# Patient Record
Sex: FEMALE | Race: WHITE | NOT HISPANIC OR LATINO | Employment: OTHER | URBAN - METROPOLITAN AREA
[De-identification: names, ages, dates, MRNs, and addresses within clinical notes are randomized per-mention and may not be internally consistent; named-entity substitution may affect disease eponyms.]

---

## 2017-01-16 ENCOUNTER — OFFICE VISIT (OUTPATIENT)
Dept: MEDICAL GROUP | Facility: CLINIC | Age: 27
End: 2017-01-16
Payer: COMMERCIAL

## 2017-01-16 ENCOUNTER — PATIENT MESSAGE (OUTPATIENT)
Dept: MEDICAL GROUP | Facility: CLINIC | Age: 27
End: 2017-01-16

## 2017-01-16 VITALS
OXYGEN SATURATION: 97 % | BODY MASS INDEX: 40.16 KG/M2 | DIASTOLIC BLOOD PRESSURE: 86 MMHG | SYSTOLIC BLOOD PRESSURE: 138 MMHG | TEMPERATURE: 96.7 F | HEIGHT: 68 IN | HEART RATE: 96 BPM | WEIGHT: 265 LBS | RESPIRATION RATE: 16 BRPM

## 2017-01-16 DIAGNOSIS — L73.2 SUPPURATIVE HIDRADENITIS: ICD-10-CM

## 2017-01-16 DIAGNOSIS — E66.01 MORBID OBESITY WITH BMI OF 40.0-44.9, ADULT (HCC): ICD-10-CM

## 2017-01-16 DIAGNOSIS — J06.9 VIRAL URI WITH COUGH: ICD-10-CM

## 2017-01-16 DIAGNOSIS — F33.1 MAJOR DEPRESSIVE DISORDER, RECURRENT EPISODE, MODERATE (HCC): ICD-10-CM

## 2017-01-16 PROCEDURE — 99214 OFFICE O/P EST MOD 30 MIN: CPT | Performed by: NURSE PRACTITIONER

## 2017-01-16 RX ORDER — SPIRONOLACTONE 25 MG/1
25 TABLET ORAL DAILY
Qty: 30 TAB | Refills: 3 | Status: SHIPPED | OUTPATIENT
Start: 2017-01-16 | End: 2017-05-29 | Stop reason: SDUPTHER

## 2017-01-16 RX ORDER — FLUTICASONE PROPIONATE 50 MCG
1 SPRAY, SUSPENSION (ML) NASAL DAILY
Qty: 16 G | Refills: 2 | Status: SHIPPED | OUTPATIENT
Start: 2017-01-16 | End: 2019-02-04

## 2017-01-16 NOTE — PROGRESS NOTES
CC: Referral Needed        HPI:     Ryanne presents today for the followin. Major depressive disorder, recurrent episode, moderate (CMS-HCC)  Here requesting referral back to [facility] psychology. Currently not on any medications for depression. Did not do well with Vybrid, effects or. One was taking low-dose Prozac. This didn't specifically help but he didn't do worse with her behavior. Zoloft made her want to kill herself.  Currently having some situational issues moving out of the house where she is having a lot of social discord with her roommates. Living with her parents. Just signed a new lease.    2. Viral URI with cough  States she's been sick for the last several weeks. Initially had some body aches. No fevers. Currently his left the dry cough lots of nasal drainage. Slight ear pressure bilaterally.    3. Suppurative hidradenitis   she's been having these boils in her bilateral armpits ever since she started menstruating around 13 or 14. Currently has a lot of scars. The decision to be aggravated by her bra touching the areas. Doesn't feel like one specific area is worse than another her that she needs specific attention. She has tried multiple different soaps etc. to try and see if there's anything she can do the Melody. Limited to her axillae. Knot in groin or other areas    4. Morbid obesity with BMI of 40.0-44.9, adult (CMS-HCC)  Above ideal weight for height. Weight is currently down    Current Outpatient Prescriptions   Medication Sig Dispense Refill   • fluticasone (FLONASE) 50 MCG/ACT nasal spray Spray 1 Spray in nose every day. 16 g 2   • spironolactone (ALDACTONE) 25 MG Tab Take 1 Tab by mouth every day. 30 Tab 3     No current facility-administered medications for this visit.     Social History   Substance Use Topics   • Smoking status: Never Smoker    • Smokeless tobacco: Never Used   • Alcohol Use: 0.5 oz/week     10-14 Cans of beer, 1 Standard drinks or equivalent per week      Comment:  "occasionally     I reviewed patients allergies, problem list and medications today in [facility].    ROS: Any/all pertinent positives listed in the HPI, otherwise all others reviewed are negative today.      /86 mmHg  Pulse 96  Temp(Src) 35.9 °C (96.7 °F)  Resp 16  Ht 1.727 m (5' 8\")  Wt 120.203 kg (265 lb)  BMI 40.30 kg/m2  SpO2 97%  LMP 12/25/2016  Breastfeeding? No    Exam:   Gen: Alert and oriented, No apparent distress. WDWN  Psych: A+Ox3, normal affect and mood  Skin: Warm, dry and intact. Good turgor   No rashes in visible areas.  Eye: Conjunctiva clear, lids normal  ENMT: Lips without lesions, good dentition   Oropharynx clear. TMs nonerythematous bilaterally, slight bulge bilaterally. No erythema bilateral nasal turbinates. No pain with pressure over the frontal or maxillary sinuses bilaterally  Neck: No Lymphadenopathy, Thyromegaly, Bruits.   Trachea midline, no masses  Lungs: Clear to auscultation bilaterally, no rales or rhonchi   Unlabored respiratory effort.   CV: Regular rate and rhythm, S1, S2. No murmurs.   No Edema        Assessment and Plan.   26 y.o. female with the following issues.    1. Major depressive disorder, recurrent episode, moderate (CMS-HCC)  Stable. We did discuss starting another medication today. She was maybe interested in Lexapro. Maybe interested in restarting Prozac and increasing the dose. We did discuss today she will consider doing this and are just getting into the intensive outpatient behavioral health program. She'll let me know what she wants to do  - REFERRAL TO BEHAVIORAL HEALTH    2. Viral URI with cough  Discussed viral versus bacterial, importance of fluids, rest and hand hygiene.  May use over-the-counter anti-pyuretics and/or antitussives as needed.  Return to the office if necessary temperature, symptoms aren't resolving or new symptoms.  Patient will start Flonase 2 sprays twice a day daily. She'll start doing a nasal saline rinse 3-4 times a day and " this is reviewed extensively how to do this in the office today. If she's not improving by the end weeks and notify me and I will send an antibiotics. She is having any worsening in her symptoms she'll notify me immediately    - fluticasone (FLONASE) 50 MCG/ACT nasal spray; Spray 1 Spray in nose every day.  Dispense: 16 g; Refill: 2    3. Suppurative hidradenitis  Stable. Trial of Aldactone. Patient has an IUD. We did discuss home care  - spironolactone (ALDACTONE) 25 MG Tab; Take 1 Tab by mouth every day.  Dispense: 30 Tab; Refill: 3    4. Morbid obesity with BMI of 40.0-44.9, adult (CMS-HCC)  She is losing weight  - Patient identified as having weight management issue.  Appropriate orders and counseling given.

## 2017-01-16 NOTE — MR AVS SNAPSHOT
"Ryanne Story   2017 10:00 AM   Office Visit   MRN: [id number]    Department:  [facility]   Dept Phone:  [phone]    Description:  Female : 1990   Provider:  OSVALDO Otero           Reason for Visit     Referral Needed for behavioral health      Allergies as of 2017     Allergen Noted Reactions    Zoloft 10/19/2015       Self-destructive behaviors      You were diagnosed with     Major depressive disorder, recurrent episode, moderate (CMS-HCC)   [296.32.ICD-9-CM]       Viral URI with cough   [482274]       Suppurative hidradenitis   [865964]         Vital Signs     Blood Pressure Pulse Temperature Respirations Height Weight    138/86 mmHg 96 35.9 °C (96.7 °F) 16 1.727 m (5' 8\") 120.203 kg (265 lb)    Body Mass Index Oxygen Saturation Last Menstrual Period Breastfeeding? Smoking Status       40.30 kg/m2 97% 2016 No Never Smoker        Basic Information     Date Of Birth Sex Race Ethnicity Preferred Language    1990 Female White Non- English      Problem List              ICD-10-CM Priority Class Noted - Resolved    BMI 40.0-44.9, adult (CMS-HCC) Z68.41   2015 - Present    Hyperlipidemia with target LDL less than 160 E78.5   2015 - Present    IUD (intrauterine device) in place Z97.5   2015 - Present    Major depressive disorder, recurrent episode, moderate (CMS-HCC) F33.1   2015 - Present    Suppurative hidradenitis L73.2   2017 - Present      Health Maintenance        Date Due Completion Dates    IMM HEP B VACCINE (1 of 3 - Primary Series) 1989 ---    IMM HEP A VACCINE (1 of 2 - Standard Series) 1990 ---    IMM VARICELLA (CHICKENPOX) VACCINE (1 of 2 - 2 Dose Adolescent Series) 2002 ---    IMM HPV VACCINE (2 of 3 - Female 3 Dose Series) 2015 (Postponed)    Override on 2015: Postponed (thinking about it)    IMM INFLUENZA (1) 2016 ---    PAP SMEAR 2017 (Done)    Override on 2014: " Done (Hx of Nl, [facility]'s office)    IMM DTaP/Tdap/Td Vaccine (2 - Td) 4/18/2025 4/18/2015            Current Immunizations     Tdap Vaccine 4/18/2015 10:45 PM      Below and/or attached are the medications your provider expects you to take. Review all of your home medications and newly ordered medications with your provider and/or pharmacist. Follow medication instructions as directed by your provider and/or pharmacist. Please keep your medication list with you and share with your provider. Update the information when medications are discontinued, doses are changed, or new medications (including over-the-counter products) are added; and carry medication information at all times in the event of emergency situations     Allergies:  ZOLOFT - (reactions not documented)               Medications  Valid as of: January 16, 2017 - 10:41 AM    Generic Name Brand Name Tablet Size Instructions for use    Fluticasone Propionate (Suspension) FLONASE 50 MCG/ACT Spray 1 Spray in nose every day.        Spironolactone (Tab) ALDACTONE 25 MG Take 1 Tab by mouth every day.        .                 Medicines prescribed today were sent to:     [facility] [address] [place] [address] (S), [place] - [address]    [facility] ([place] [place] [address]    Phone: [phone] Fax: [phone]    Open 24 Hours?: No      Medication refill instructions:       If your prescription bottle indicates you have medication refills left, it is not necessary to call your provider’s office. Please contact your pharmacy and they will refill your medication.    If your prescription bottle indicates you do not have any refills left, you may request refills at any time through one of the following ways: The online [email or web address] system (except Urgent Care), by calling your provider’s office, or by asking your pharmacy to contact your provider’s office with a refill request. Medication refills are processed only during regular business hours and may not be available until  the next business day. Your provider may request additional information or to have a follow-up visit with you prior to refilling your medication.   *Please Note: Medication refills are assigned a new Rx number when refilled electronically. Your pharmacy may indicate that no refills were authorized even though a new prescription for the same medication is available at the pharmacy. Please request the medicine by name with the pharmacy before contacting your provider for a refill.        Referral     A referral request has been sent to our patient care coordination department. Please allow 3-5 business days for us to process this request and contact you either by phone or mail. If you do not hear from us by the 5th business day, please call us at [phone].           [employer] Access Code: Activation code not generated  Current [employer] Status: Active

## 2017-01-17 RX ORDER — PHENTERMINE HYDROCHLORIDE 37.5 MG/1
37.5 CAPSULE ORAL EVERY MORNING
Qty: 30 CAP | Refills: 2 | Status: SHIPPED | OUTPATIENT
Start: 2017-01-17 | End: 2017-07-25

## 2017-03-16 ENCOUNTER — PATIENT MESSAGE (OUTPATIENT)
Dept: MEDICAL GROUP | Facility: CLINIC | Age: 27
End: 2017-03-16

## 2017-03-16 DIAGNOSIS — E78.5 HYPERLIPIDEMIA WITH TARGET LDL LESS THAN 160: ICD-10-CM

## 2017-03-16 DIAGNOSIS — F33.1 MAJOR DEPRESSIVE DISORDER, RECURRENT EPISODE, MODERATE (HCC): ICD-10-CM

## 2017-05-10 ENCOUNTER — PATIENT MESSAGE (OUTPATIENT)
Dept: MEDICAL GROUP | Facility: CLINIC | Age: 27
End: 2017-05-10

## 2017-05-10 RX ORDER — BENZONATATE 100 MG/1
100 CAPSULE ORAL 3 TIMES DAILY PRN
Qty: 60 CAP | Refills: 0 | Status: SHIPPED | OUTPATIENT
Start: 2017-05-10 | End: 2018-01-22

## 2017-05-17 ENCOUNTER — TELEPHONE (OUTPATIENT)
Dept: MEDICAL GROUP | Facility: CLINIC | Age: 27
End: 2017-05-17

## 2017-05-30 RX ORDER — SPIRONOLACTONE 25 MG/1
TABLET ORAL
Qty: 30 TAB | Refills: 5 | Status: SHIPPED | OUTPATIENT
Start: 2017-05-30 | End: 2017-07-03 | Stop reason: SDUPTHER

## 2017-07-01 ENCOUNTER — PATIENT MESSAGE (OUTPATIENT)
Dept: MEDICAL GROUP | Facility: CLINIC | Age: 27
End: 2017-07-01

## 2017-07-03 RX ORDER — SPIRONOLACTONE 25 MG/1
25 TABLET ORAL DAILY
Qty: 30 TAB | Refills: 5 | Status: SHIPPED | OUTPATIENT
Start: 2017-07-03 | End: 2018-01-22 | Stop reason: SDUPTHER

## 2017-07-03 NOTE — TELEPHONE ENCOUNTER
From: Ryanne Story  To: OSVALDO Otero  Sent: 7/1/2017 9:01 PM PDT  Subject: Prescription Question    Has my pharmacy contacted your office re: a refill for spironolactone? Whenever I try to refill it it says they need go contact you for renewal but then to my knowledge nothing happens after that. Thanks :)

## 2017-07-17 ENCOUNTER — TELEPHONE (OUTPATIENT)
Dept: MEDICAL GROUP | Facility: CLINIC | Age: 27
End: 2017-07-17

## 2017-07-25 ENCOUNTER — OFFICE VISIT (OUTPATIENT)
Dept: MEDICAL GROUP | Facility: CLINIC | Age: 27
End: 2017-07-25
Payer: COMMERCIAL

## 2017-07-25 VITALS
SYSTOLIC BLOOD PRESSURE: 106 MMHG | OXYGEN SATURATION: 97 % | DIASTOLIC BLOOD PRESSURE: 68 MMHG | HEART RATE: 108 BPM | BODY MASS INDEX: 41.83 KG/M2 | RESPIRATION RATE: 16 BRPM | TEMPERATURE: 98.1 F | WEIGHT: 276 LBS | HEIGHT: 68 IN

## 2017-07-25 DIAGNOSIS — F17.200 SMOKER: ICD-10-CM

## 2017-07-25 DIAGNOSIS — Z86.59 HISTORY OF SUICIDAL IDEATION: ICD-10-CM

## 2017-07-25 DIAGNOSIS — F33.1 MAJOR DEPRESSIVE DISORDER, RECURRENT EPISODE, MODERATE (HCC): ICD-10-CM

## 2017-07-25 DIAGNOSIS — F33.2 SEVERE EPISODE OF RECURRENT MAJOR DEPRESSIVE DISORDER, WITHOUT PSYCHOTIC FEATURES (HCC): ICD-10-CM

## 2017-07-25 PROBLEM — F32.9 MAJOR DEPRESSIVE DISORDER: Status: ACTIVE | Noted: 2017-07-25

## 2017-07-25 PROCEDURE — 93000 ELECTROCARDIOGRAM COMPLETE: CPT | Performed by: NURSE PRACTITIONER

## 2017-07-25 PROCEDURE — 99214 OFFICE O/P EST MOD 30 MIN: CPT | Mod: 25 | Performed by: NURSE PRACTITIONER

## 2017-07-25 RX ORDER — ZIPRASIDONE HYDROCHLORIDE 20 MG/1
CAPSULE ORAL
COMMUNITY
Start: 2017-07-06 | End: 2019-02-04

## 2017-07-25 ASSESSMENT — PATIENT HEALTH QUESTIONNAIRE - PHQ9
SUM OF ALL RESPONSES TO PHQ QUESTIONS 1-9: 20
CLINICAL INTERPRETATION OF PHQ2 SCORE: 2
5. POOR APPETITE OR OVEREATING: 3 - NEARLY EVERY DAY

## 2017-07-25 NOTE — MR AVS SNAPSHOT
"        Ryanne Story   2017 1:00 PM   Office Visit   MRN: [id number]    Department:  [facility]   Dept Phone:  [phone]    Description:  Female : 1990   Provider:  OSVALDO Otero           Reason for Visit     Depression           Allergies as of 2017     Allergen Noted Reactions    Zoloft 10/19/2015       Self-destructive behaviors      You were diagnosed with     Major depressive disorder, recurrent episode, moderate (CMS-HCC)   [296.32.ICD-9-CM]       Severe episode of recurrent major depressive disorder, without psychotic features (CMS-[facility])   [7199179]       History of suicidal ideation   [0002678]       Smoker   [484588]       BMI 40.0-44.9, adult (CMS-HCC)   [059256]         Vital Signs     Blood Pressure Pulse Temperature Respirations Height Weight    106/68 mmHg 108 36.7 °C (98.1 °F) 16 1.727 m (5' 7.99\") 125.193 kg (276 lb)    Body Mass Index Oxygen Saturation Smoking Status             41.98 kg/m2 97% Never Smoker          Basic Information     Date Of Birth Sex Race Ethnicity Preferred Language    1990 Female White Non- English      Problem List              ICD-10-CM Priority Class Noted - Resolved    BMI 40.0-44.9, adult (CMS-HCC) Z68.41   2015 - Present    Hyperlipidemia with target LDL less than 160 E78.5   2015 - Present    IUD (intrauterine device) in place Z97.5   2015 - Present    Major depressive disorder, recurrent episode, moderate (CMS-HCC) F33.1   2015 - Present    Suppurative hidradenitis L73.2   2017 - Present    Morbid obesity with BMI of 40.0-44.9, adult ([facility]) E66.01, Z68.41   2017 - Present    Major depressive disorder F32.9   2017 - Present      Health Maintenance        Date Due Completion Dates    PAP SMEAR 2017 (Done)    Override on 2014: Done (Hx of Nl, [facility] office)    IMM INFLUENZA (1) 2017 ---    IMM DTaP/Tdap/Td Vaccine (2 - Td) 2025            Current " Immunizations     Tdap Vaccine 4/18/2015 10:45 PM      Below and/or attached are the medications your provider expects you to take. Review all of your home medications and newly ordered medications with your provider and/or pharmacist. Follow medication instructions as directed by your provider and/or pharmacist. Please keep your medication list with you and share with your provider. Update the information when medications are discontinued, doses are changed, or new medications (including over-the-counter products) are added; and carry medication information at all times in the event of emergency situations     Allergies:  ZOLOFT - (reactions not documented)               Medications  Valid as of: July 25, 2017 -  2:10 PM    Generic Name Brand Name Tablet Size Instructions for use    Benzonatate (Cap) TESSALON 100 MG Take 1 Cap by mouth 3 times a day as needed for Cough.        Fluticasone Propionate (Suspension) FLONASE 50 MCG/ACT Spray 1 Spray in nose every day.        Spironolactone (Tab) ALDACTONE 25 MG Take 1 Tab by mouth every day.        Varenicline Tartrate (Misc) CHANTIX JERICHO 0.5 MG X 11 & 1 MG X 42 Per packet        Ziprasidone HCl (Cap) GEODON 20 MG         .                 Medicines prescribed today were sent to:     [facility] [address] [place] [address] (S), [place] - [address] [place] [address] [facility] (S) [place] [address]    Phone: [phone] Fax: [phone]    Open 24 Hours?: No      Medication refill instructions:       If your prescription bottle indicates you have medication refills left, it is not necessary to call your provider’s office. Please contact your pharmacy and they will refill your medication.    If your prescription bottle indicates you do not have any refills left, you may request refills at any time through one of the following ways: The online [employer] system (except Urgent Care), by calling your provider’s office, or by asking your pharmacy to contact your provider’s office with a refill  request. Medication refills are processed only during regular business hours and may not be available until the next business day. Your provider may request additional information or to have a follow-up visit with you prior to refilling your medication.   *Please Note: Medication refills are assigned a new Rx number when refilled electronically. Your pharmacy may indicate that no refills were authorized even though a new prescription for the same medication is available at the pharmacy. Please request the medicine by name with the pharmacy before contacting your provider for a refill.           [employer] Access Code: Activation code not generated  Current [employer] Status: Active

## 2017-07-25 NOTE — PROGRESS NOTES
CC: Depression        HPI:     Raynne presents today for the followin. Major depressive disorder, recurrent episode, moderate (CMS-HCC)/Severe episode of recurrent major depressive disorder, without psychotic features (CMS-HCC)/History of suicidal ideation  Patient is here today requiring an EKG done by our office before her outside psychiatrist will prescribed Vyvanse. She is currently on Geodon per their office.  She continues to have significant depression. She denies any current suicidality. States a month ago or more she thought about taking all of her medications together to see if it would lower her heart rate. She denies that she has reason/plan/ thought to do this currently. She does have a history of years ago trying to hurt herself.   Previously has done well with other stimulant such as phentermine.      4. Smoker  Recently started smoking quite a bit. She is a . She states she smokes to avoid people at work. This can go up to 2 packs a day. She does want to quit. Previously nicotine replacement has not worked    5. BMI 40.0-44.9, adult (CMS-HCC)  Above ideal weight for height.    Current Outpatient Prescriptions   Medication Sig Dispense Refill   • varenicline (CHANTIX STARTING MONTH ) 0.5 MG X 11 & 1 MG X 42 tablet Per packet 56 Tab 3   • ziprasidone (GEODON) 20 MG Cap      • spironolactone (ALDACTONE) 25 MG Tab Take 1 Tab by mouth every day. 30 Tab 5   • benzonatate (TESSALON) 100 MG Cap Take 1 Cap by mouth 3 times a day as needed for Cough. 60 Cap 0   • fluticasone (FLONASE) 50 MCG/ACT nasal spray Spray 1 Spray in nose every day. 16 g 2     No current facility-administered medications for this visit.     Social History   Substance Use Topics   • Smoking status: Never Smoker    • Smokeless tobacco: Never Used   • Alcohol Use: 0.5 oz/week     10-14 Cans of beer, 1 Standard drinks or equivalent per week      Comment: occasionally     I reviewed patients allergies, problem list and  "medications today in EP[facility].    ROS: Any/all pertinent positives listed in the HPI, otherwise all others reviewed are negative today.      /68 mmHg  Pulse 108  Temp(Src) 36.7 °C (98.1 °F)  Resp 16  Ht 1.727 m (5' 7.99\")  Wt 125.193 kg (276 lb)  BMI 41.98 kg/m2  SpO2 97%    Exam:   Gen: Alert and oriented, No apparent distress. WDWN  Psych: A+Ox3, normal affect and mood  Skin: Warm, dry and intact. Good turgor   No rashes in visible areas.  Eye: Conjunctiva clear, lids normal  ENMT: Lips without lesions, good dentition    Lungs: Clear to auscultation bilaterally, no rales or rhonchi   Unlabored respiratory effort.   CV: Regular rate and rhythm, S1, S2. No murmurs.   No Edema  EKG Interpretation-HR is 96  normal EKG, normal sinus rhythm, there are no previous tracings available for comparison        Depression Screening    Little interest or pleasure in doing things?  1 - several days  Feeling down, depressed , or hopeless? 1 - several days  Trouble falling or staying asleep, or sleeping too much?  3 - nearly every day  Feeling tired or having little energy?  3 - nearly every day  Poor appetite or overeating?  3 - nearly every day  Feeling bad about yourself - or that you are a failure or have let yourself or your family down? 3 - nearly every day  Trouble concentrating on things, such as reading the newspaper or watching television? 3 - nearly every day  Moving or speaking so slowly that other people could have noticed.  Or the opposite - being so fidgety or restless that you have been moving around a lot more than usual?  2 - more than half the days  Thoughts that you would be better off dead, or of hurting yourself?  1 - several days  Patient Health Questionnaire Score: 20        Assessment and Plan.   27 y.o. female with the following issues.    1. Major depressive disorder, recurrent episode, moderate (CMS-HCC) /Severe episode of recurrent major depressive disorder, without psychotic features " (CMS-HCC)  Stable within normal EKG. Her heart rate is at baseline for her. A copy of EKG was given to the patient to take to her psychiatrist-she has an appointment with him next month.  - EKG - Clinic performed  - Patient has been identified as being depressed and appropriate orders and counseling have been given    2. History of suicidal ideation  We did have a long discussion regarding any possible risk for medications such as Chantix to worsen her mood. If she has any worsening depression anxiety or a return of her previous suicidality she is to stop the medication immediately and notify the office. We discussed that this medication only has a weak association with problems with mood at this point. At this point she would like to take the medication to quit smoking. She has complete verbalized understanding about stopping the medication immediately if she is noticing a trend of worsening mood.  - Patient has been identified as being depressed and appropriate orders and counseling have been given    4. Smoker  As above. Discussed risks and side effects of medication  - varenicline (CHANTIX STARTING MONTH PAK) 0.5 MG X 11 & 1 MG X 42 tablet; Per packet  Dispense: 56 Tab; Refill: 3    5. BMI 40.0-44.9, adult (CMS-HCC)  Healthy lifestyle was encouraged  - Patient identified as having weight management issue.  Appropriate orders and counseling given.

## 2018-01-21 ENCOUNTER — PATIENT MESSAGE (OUTPATIENT)
Dept: MEDICAL GROUP | Facility: CLINIC | Age: 28
End: 2018-01-21

## 2018-01-22 RX ORDER — SPIRONOLACTONE 50 MG/1
50 TABLET, FILM COATED ORAL DAILY
Qty: 90 TAB | Refills: 3 | Status: SHIPPED | OUTPATIENT
Start: 2018-01-22 | End: 2018-10-18

## 2018-01-22 NOTE — TELEPHONE ENCOUNTER
From: Ryanne Story  To: OSVALDO Otero  Sent: 1/21/2018 1:23 PM PST  Subject: Prescription Question    Hi there,    I've been doing some research on Hydradenitis Suppurativa which I believe is what we're treating with the spironolactone. I've noticed in the literature I've read that the dosages of nelsy used were at least 4 times what I'm on. I was wondering if we could try upping my dose (25mg) to whatever you feel is the next appropriate level to see how it affects my skin. I've got some gnarly abscesses forming right now and I'll try anything if there's a chance these might go away--or at least calm down!    I appreciate your help!    Ryanne

## 2018-10-17 ENCOUNTER — PATIENT MESSAGE (OUTPATIENT)
Dept: MEDICAL GROUP | Facility: MEDICAL CENTER | Age: 28
End: 2018-10-17

## 2018-10-18 RX ORDER — SPIRONOLACTONE 100 MG/1
100 TABLET, FILM COATED ORAL DAILY
Qty: 90 TAB | Refills: 2 | Status: SHIPPED | OUTPATIENT
Start: 2018-10-18 | End: 2019-08-08 | Stop reason: SDUPTHER

## 2018-11-11 RX ORDER — SPIRONOLACTONE 50 MG/1
TABLET, FILM COATED ORAL
Qty: 90 TAB | Refills: 3 | OUTPATIENT
Start: 2018-11-11

## 2018-12-18 ENCOUNTER — HOSPITAL ENCOUNTER (OUTPATIENT)
Dept: LAB | Facility: MEDICAL CENTER | Age: 28
End: 2018-12-18
Attending: OBSTETRICS & GYNECOLOGY
Payer: COMMERCIAL

## 2018-12-18 PROCEDURE — 87491 CHLMYD TRACH DNA AMP PROBE: CPT

## 2018-12-18 PROCEDURE — 88175 CYTOPATH C/V AUTO FLUID REDO: CPT

## 2018-12-18 PROCEDURE — 87591 N.GONORRHOEAE DNA AMP PROB: CPT

## 2018-12-20 LAB
C TRACH DNA GENITAL QL NAA+PROBE: NEGATIVE
CYTOLOGY REG CYTOL: NORMAL
N GONORRHOEA DNA GENITAL QL NAA+PROBE: NEGATIVE
SPECIMEN SOURCE: NORMAL

## 2019-02-04 ENCOUNTER — OFFICE VISIT (OUTPATIENT)
Dept: MEDICAL GROUP | Facility: MEDICAL CENTER | Age: 29
End: 2019-02-04
Payer: COMMERCIAL

## 2019-02-04 VITALS
DIASTOLIC BLOOD PRESSURE: 82 MMHG | HEIGHT: 68 IN | HEART RATE: 89 BPM | OXYGEN SATURATION: 96 % | SYSTOLIC BLOOD PRESSURE: 122 MMHG | WEIGHT: 255 LBS | TEMPERATURE: 96.2 F | RESPIRATION RATE: 16 BRPM | BODY MASS INDEX: 38.65 KG/M2

## 2019-02-04 DIAGNOSIS — F17.200 SMOKER: ICD-10-CM

## 2019-02-04 DIAGNOSIS — J30.9 ALLERGIC RHINITIS, UNSPECIFIED SEASONALITY, UNSPECIFIED TRIGGER: ICD-10-CM

## 2019-02-04 DIAGNOSIS — H93.8X3 PRESSURE SENSATION IN BOTH EARS: ICD-10-CM

## 2019-02-04 DIAGNOSIS — Z23 NEED FOR VACCINATION: ICD-10-CM

## 2019-02-04 DIAGNOSIS — F33.1 MAJOR DEPRESSIVE DISORDER, RECURRENT EPISODE, MODERATE (HCC): ICD-10-CM

## 2019-02-04 DIAGNOSIS — E66.9 OBESITY (BMI 35.0-39.9 WITHOUT COMORBIDITY): ICD-10-CM

## 2019-02-04 PROCEDURE — 90686 IIV4 VACC NO PRSV 0.5 ML IM: CPT | Performed by: NURSE PRACTITIONER

## 2019-02-04 PROCEDURE — 90471 IMMUNIZATION ADMIN: CPT | Performed by: NURSE PRACTITIONER

## 2019-02-04 PROCEDURE — 99214 OFFICE O/P EST MOD 30 MIN: CPT | Mod: 25 | Performed by: NURSE PRACTITIONER

## 2019-02-04 RX ORDER — LAMOTRIGINE 25 MG/1
50 TABLET ORAL
COMMUNITY
Start: 2019-01-14 | End: 2020-01-20

## 2019-02-04 RX ORDER — FLUTICASONE PROPIONATE 50 MCG
2 SPRAY, SUSPENSION (ML) NASAL DAILY
Qty: 1 BOTTLE | Refills: 11 | Status: SHIPPED | OUTPATIENT
Start: 2019-02-04 | End: 2019-02-04 | Stop reason: SDUPTHER

## 2019-02-04 RX ORDER — FLUTICASONE PROPIONATE 50 MCG
2 SPRAY, SUSPENSION (ML) NASAL DAILY
Qty: 1 BOTTLE | Refills: 11 | Status: SHIPPED | OUTPATIENT
Start: 2019-02-04 | End: 2020-06-20 | Stop reason: SDUPTHER

## 2019-02-04 RX ORDER — QUETIAPINE FUMARATE 50 MG/1
TABLET, FILM COATED ORAL
COMMUNITY
Start: 2019-01-22 | End: 2020-01-20

## 2019-02-04 RX ORDER — LISDEXAMFETAMINE DIMESYLATE 60 MG/1
CAPSULE ORAL
COMMUNITY
Start: 2019-01-15 | End: 2019-09-30

## 2019-02-04 RX ORDER — ZIPRASIDONE HYDROCHLORIDE 20 MG/1
20 CAPSULE ORAL 2 TIMES DAILY
COMMUNITY
End: 2020-02-21 | Stop reason: SDUPTHER

## 2019-02-04 NOTE — PROGRESS NOTES
"CC: Ear Fullness (right ear issue x 3 months and wants to know; Chantix )        HPI:     Ryanne presents today for the followin. Major depressive disorder, recurrent episode, moderate (HCC)  Is followed by a psychiatry clinic,  currently sees Kathleen Sánchez.  Currently on Geodon, Lamictal, Seroquel.  States that she is the best that she is been in terms of her mood.  They are continuing to work on improving it.  Has not seen her therapist in a year.  Does not need a referral to see a therapist     2. Pressure sensation in both ears/Allergic rhinitis, unspecified seasonality, unspecified trigger  Primarily here because she is been having a lot of pressure popping and leaking sensations mostly in the right ear rarely to in the left ear.  Started 3 months ago.  Did try using intermittent jose m pot and saline rinses.  Recently she is added Flonase and she uses it a few times a week 2 sprays each nostril..  States when she has pressure  doing little \"sniffing \"type sounds actions with her nose seems to improve it    4. Smoker  Wants to quit smoking.  States mostly she smokes so that she is able to take a break at work not so much because she has a nicotine dependency.  Also states that previous using nicotine gum did not help, she suspects for the same reason as above.  We did order Chantix last year however her insurance would not cover it unless she went to a class.  She was unable to at that time because of her work schedule.    5.Obesity (BMI 35.0-39.9 without comorbidity)  Weight is down about 20 pounds since she was seen in 2017    Current Outpatient Prescriptions   Medication Sig Dispense Refill   • varenicline (CHANTIX STARTING MONTH [place]ER[place]) 0.5 MG X 11 & 1 MG X 42 tablet Per packet 56 Tab 3   • ziprasidone (GEODON) 20 MG Cap Take 20 mg by mouth 2 Times a Day.     • fluticasone (FLONASE) 50 MCG/ACT nasal spray Spray 2 Sprays in nose every day. 1 Bottle 11   • VYVANSE 60 MG Cap      • lamoTRIgine (LAMICTAL) 25 MG " "Tab 50 mg.     • quetiapine (SEROQUEL) 50 MG tablet      • spironolactone (ALDACTONE) 100 MG Tab Take 1 Tab by mouth every day. 90 Tab 2     No current facility-administered medications for this visit.      Social History   Substance Use Topics   • Smoking status: Current Every Day Smoker     Types: Cigarettes   • Smokeless tobacco: Never Used      Comment: nicotine replacmeent didnt work; insurance wont pay for chatix   • Alcohol use 0.5 oz/week     10 - 14 Cans of beer, 1 Standard drinks or equivalent per week      Comment: occasionally     I reviewed patients allergies, problem list and medications today in [facility].    ROS: Any/all pertinent positives listed in the HPI, otherwise all others reviewed are negative today.      /82 (BP Location: Left arm, Patient Position: Sitting, BP Cuff Size: Adult)   Pulse 89   Temp (!) 35.7 °C (96.2 °F) (Temporal)   Resp 16   Ht 1.727 m (5' 8\")   Wt 115.7 kg (255 lb)   SpO2 96%   BMI 38.77 kg/m²     Exam:   Gen: Alert and oriented, No apparent distress. WDWN  Psych: A+Ox3, normal affect and mood  Skin: Warm, dry and intact. Good turgor   No rashes in visible areas.  Eye: Conjunctiva clear, lids normal  ENMT: Lips without lesions, good dentition   Oropharynx clear.  Bilateral TMs unremarkable nonerythematous.  Mild to moderate erythema bilateral nasal turbinates  Neck: No Lymphadenopathy, Thyromegaly, Bruits.   Trachea midline, no masses  Lungs: Clear to auscultation bilaterally, no rales or rhonchi   Unlabored respiratory effort.   CV: Regular rate and rhythm, S1, S2. No murmurs.   No Edema        Assessment and Plan.   28 y.o. female with the following issues.    1. Major depressive disorder, recurrent episode, moderate (HCC)  Stable and followed by outside psychiatry.    2. Pressure sensation in both ears/ Allergic rhinitis, unspecified seasonality, unspecified trigger  Eustachian tube dysfunction.  Will do Flonase regularly.  Continue with saline.  Discussed that we " could consider oral steroids however they can have an impact on mood so we will avoid that for now  - fluticasone (FLONASE) 50 MCG/ACT nasal spray; Spray 2 Sprays in nose every day.  Dispense: 1 Bottle; Refill: 11    4. Smoker  We will see if we can get prior approval for Chantix.  She was given information as discussed one option is online for class on smoking cessation.  We did discuss that these can affect mood and that if her psychiatrist feel she be a good candidate for bupropion we could try getting that filled at the pharmacy  - varenicline (CHANTIX STARTING MONTH PAK) 0.5 MG X 11 & 1 MG X 42 tablet; Per packet  Dispense: 56 Tab; Refill: 3    5. Need for vaccination  I have placed the below orders and discussed them with an approved delegating provider. The MA is performing the below orders under the direction of an office MD.  -Given today.  - Influenza Vaccine Quad Injection >3Y (PF)    6. Obesity (BMI 35.0-39.9 without comorbidity)  Weight is down  - Patient identified as having weight management issue.  Appropriate orders and counseling given.

## 2019-08-07 ENCOUNTER — PATIENT MESSAGE (OUTPATIENT)
Dept: MEDICAL GROUP | Facility: MEDICAL CENTER | Age: 29
End: 2019-08-07

## 2019-08-08 RX ORDER — SPIRONOLACTONE 100 MG/1
100 TABLET, FILM COATED ORAL DAILY
Qty: 90 TAB | Refills: 2 | Status: SHIPPED | OUTPATIENT
Start: 2019-08-08 | End: 2020-05-07 | Stop reason: SDUPTHER

## 2019-09-30 ENCOUNTER — OFFICE VISIT (OUTPATIENT)
Dept: MEDICAL GROUP | Facility: MEDICAL CENTER | Age: 29
End: 2019-09-30
Payer: COMMERCIAL

## 2019-09-30 VITALS
WEIGHT: 222 LBS | OXYGEN SATURATION: 98 % | TEMPERATURE: 98 F | BODY MASS INDEX: 33.65 KG/M2 | HEIGHT: 68 IN | DIASTOLIC BLOOD PRESSURE: 70 MMHG | HEART RATE: 98 BPM | SYSTOLIC BLOOD PRESSURE: 106 MMHG | RESPIRATION RATE: 14 BRPM

## 2019-09-30 DIAGNOSIS — L65.9 HAIR LOSS: ICD-10-CM

## 2019-09-30 DIAGNOSIS — Z13.21 ENCOUNTER FOR VITAMIN DEFICIENCY SCREENING: ICD-10-CM

## 2019-09-30 DIAGNOSIS — F31.9 BIPOLAR DISORDER WITH DEPRESSION (HCC): ICD-10-CM

## 2019-09-30 DIAGNOSIS — R53.83 FATIGUE, UNSPECIFIED TYPE: ICD-10-CM

## 2019-09-30 DIAGNOSIS — E66.9 OBESITY (BMI 30.0-34.9): ICD-10-CM

## 2019-09-30 DIAGNOSIS — F33.1 MAJOR DEPRESSIVE DISORDER, RECURRENT EPISODE, MODERATE (HCC): ICD-10-CM

## 2019-09-30 DIAGNOSIS — E78.5 HYPERLIPIDEMIA, UNSPECIFIED HYPERLIPIDEMIA TYPE: ICD-10-CM

## 2019-09-30 DIAGNOSIS — Z23 NEED FOR VACCINATION: ICD-10-CM

## 2019-09-30 DIAGNOSIS — F17.200 SMOKER: ICD-10-CM

## 2019-09-30 PROCEDURE — 90686 IIV4 VACC NO PRSV 0.5 ML IM: CPT | Performed by: NURSE PRACTITIONER

## 2019-09-30 PROCEDURE — 99214 OFFICE O/P EST MOD 30 MIN: CPT | Mod: 25 | Performed by: NURSE PRACTITIONER

## 2019-09-30 PROCEDURE — 90471 IMMUNIZATION ADMIN: CPT | Performed by: NURSE PRACTITIONER

## 2019-09-30 RX ORDER — LISDEXAMFETAMINE DIMESYLATE 70 MG/1
70 CAPSULE ORAL EVERY MORNING
Refills: 0 | COMMUNITY
Start: 2019-09-01 | End: 2019-12-16 | Stop reason: SDUPTHER

## 2019-09-30 RX ORDER — CARBAMAZEPINE 200 MG/1
200 TABLET, EXTENDED RELEASE ORAL
Refills: 2 | COMMUNITY
Start: 2019-08-28 | End: 2019-11-28 | Stop reason: SDUPTHER

## 2019-09-30 RX ORDER — TOPIRAMATE 50 MG/1
TABLET, FILM COATED ORAL
COMMUNITY
Start: 2019-09-27 | End: 2020-01-20

## 2019-09-30 ASSESSMENT — PATIENT HEALTH QUESTIONNAIRE - PHQ9
2. FEELING DOWN, DEPRESSED, IRRITABLE, OR HOPELESS: MORE THAN HALF THE DAYS
5. POOR APPETITE OR OVEREATING: NEARLY EVERY DAY
7. TROUBLE CONCENTRATING ON THINGS, SUCH AS READING THE NEWSPAPER OR WATCHING TELEVISION: NOT AT ALL
1. LITTLE INTEREST OR PLEASURE IN DOING THINGS: MORE THAN HALF THE DAYS
SUM OF ALL RESPONSES TO PHQ QUESTIONS 1-9: 15
3. TROUBLE FALLING OR STAYING ASLEEP OR SLEEPING TOO MUCH: MORE THAN HALF THE DAYS
4. FEELING TIRED OR HAVING LITTLE ENERGY: NEARLY EVERY DAY
SUM OF ALL RESPONSES TO PHQ9 QUESTIONS 1 AND 2: 4
9. THOUGHTS THAT YOU WOULD BE BETTER OFF DEAD, OR OF HURTING YOURSELF: NOT AT ALL
8. MOVING OR SPEAKING SO SLOWLY THAT OTHER PEOPLE COULD HAVE NOTICED. OR THE OPPOSITE, BEING SO FIGETY OR RESTLESS THAT YOU HAVE BEEN MOVING AROUND A LOT MORE THAN USUAL: NOT AT ALL
6. FEELING BAD ABOUT YOURSELF - OR THAT YOU ARE A FAILURE OR HAVE LET YOURSELF OR YOUR FAMILY DOWN: NEARLY EVERY DAY

## 2019-09-30 NOTE — PROGRESS NOTES
"CC: Hair/Scalp Problem (large concerning clumps; not feeling well / fatigue. )        HPI:     Ryanne presents today for the followin. Major depressive disorder, recurrent episode, moderate (HCC)/Bipolar disorder with depression (HCC)  Followed by outside psychiatry provider Kathleen martines and has been seen by her for years.  She is currently doing Vyvanse 75 mg in the morning, Tegretol 200 mg twice daily, 50 mg of topiramate, 20 mg Geodon, 25 mg Seroquel.  She is been on all these medications fairly stably for quite some time.      3. Hyperlipidemia, unspecified hyperlipidemia type  Last lipid panel in  was elevated    4. Hair loss  Complains of generalized hair loss over the last several weeks.  Has been noticing more hair in her hands when she is washing her hair.  Her hairdresser also said the same.  Was taking biotin and does take a daily multivitamin.  Unsure if related to her recent significant intentional weight loss    5. Fatigue, unspecified type  Describes what sounds almost to me like a lack of motivation.  She states that she will be sitting on the couch playing games on her cell phone and thinking about all the things she needs to do that day however she will build to motivate herself to get up and do them.  She states this comes and goes for example yesterday she was out in the yard doing yard work all day.  Does not appear to be so much as a lack of physical energy.  This is been present for about 6-weeks and may have been associated with an increase in her Tegretol from 200 mg a day to 200 mg twice daily.    6. Obesity (BMI 30.0-34.9)  30 pound weight loss in the last 7 months.  Possible total weight loss in the last year about 50 pounds.  Some of this is attributing to get a new relationship.  She also did keto diet for several months which was very effective for weight loss.    7. Smoker  Quit smoking with Chantix for several months however started again \"due to stress\".  Would like to try " "Chantix again.  She did state that she felt her mood was a little different while on it however she also been started on a similar medication that caused issues at the same time.    Current Outpatient Medications   Medication Sig Dispense Refill   • varenicline (CHANTIX STARTING MONTH PAK) 0.5 MG X 11 & 1 MG X 42 tablet Per starter pack 56 Tab 3   • carbamazepine SR (TEGRETOL XR) 200 MG TABLET SR 12 HR extended-release tablet Take 200 mg by mouth.  2   • topiramate (TOPAMAX) 50 MG tablet      • VYVANSE 70 MG capsule Take 70 mg by mouth every morning.  0   • spironolactone (ALDACTONE) 100 MG Tab Take 1 Tab by mouth every day. 90 Tab 2   • lamoTRIgine (LAMICTAL) 25 MG Tab 50 mg.     • quetiapine (SEROQUEL) 50 MG tablet      • ziprasidone (GEODON) 20 MG Cap Take 20 mg by mouth 2 Times a Day.     • fluticasone (FLONASE) 50 MCG/ACT nasal spray Spray 2 Sprays in nose every day. 1 Bottle 11     No current facility-administered medications for this visit.      Social History     Tobacco Use   • Smoking status: Current Every Day Smoker     Types: Cigarettes   • Smokeless tobacco: Never Used   • Tobacco comment: nicotine replacmeent didnt work; insurance wont pay for chatix   Substance Use Topics   • Alcohol use: Yes     Alcohol/week: 0.5 oz     Types: 10 - 14 Cans of beer, 1 Standard drinks or equivalent per week     Comment: occasionally   • Drug use: No     I reviewed patients allergies, problem list and medications today in [facility].    ROS: Any/all pertinent positives listed in the HPI, otherwise all others reviewed are negative today.      /70 (BP Location: Left arm, Patient Position: Sitting, BP Cuff Size: Adult)   Pulse 98   Temp 36.7 °C (98 °F) (Temporal)   Resp 14   Ht 1.727 m (5' 8\")   Wt 100.7 kg (222 lb)   LMP 09/19/2019 (Exact Date)   SpO2 98%   BMI 33.75 kg/m²     Exam:    Gen: Alert and oriented, No apparent distress. WDWN  Psych: A+Ox3, normal affect and mood, intermittent decreased eye " contact  Skin: Warm, dry and intact. Good turgor   No rashes in visible areas.  Eye: Conjunctiva clear, lids normal  ENMT: Lips without lesions, good dentition  Neck: No Lymphadenopathy, Thyromegaly, Bruits.   Trachea midline, no masses  Lungs: Clear to auscultation bilaterally, no rales or rhonchi   Unlabored respiratory effort.   CV: Regular rate and rhythm, S1, S2. No murmurs.   No Edema  Ext: No clubbing, cyanosis, edema.   Generalized thinning hair.  No female pattern baldness.  No specific areas of hair loss.    Depression Screening    Little interest or pleasure in doing things?   More than half the days  Feeling down, depressed , or hopeless?  More than half the days  Trouble falling or staying asleep, or sleeping too much?   More than half the days  Feeling tired or having little energy?   Nearly every day  Poor appetite or overeating?   Nearly every day  Feeling bad about yourself - or that you are a failure or have let yourself or your family down?  Nearly every day  Trouble concentrating on things, such as reading the newspaper or watching television?  Not at all  Moving or speaking so slowly that other people could have noticed.  Or the opposite - being so fidgety or restless that you have been moving around a lot more than usual?   Not at all  Thoughts that you would be better off dead, or of hurting yourself?   Not at all, confirmed by provider  Patient Health Questionnaire Score:  (!) 15        Assessment and Plan.   29 y.o. female with the following issues.    1. Major depressive disorder, recurrent episode, moderate (HCC)/Bipolar disorder with depression (HCC)  Stable.  She is followed by outside psychiatry office.  She has had difficulty with scheduling and a possible insurance issue.  If this persists she will notify me and I will place a different referral.  She denies any SI or HI today.  She is compliant with all of her medications  - Comp Metabolic Panel; Future  - CBC WITH DIFFERENTIAL;  Future  - TSH; Future    2. Hyperlipidemia, unspecified hyperlipidemia type  Labs as below.  Diet is changed majorly since this was last done  - Comp Metabolic Panel; Future  - Lipid Profile; Future    4. Hair loss  Labs as below  - FERRITIN; Future    5. Fatigue, unspecified type  Describes some fatigue versus lack of motivation.  Unsure if physical versus mental.  She will follow-up with this with her psychiatrist as well.    6. Obesity (BMI 30.0-34.9)  Patient has lost 30 pounds since she was seen in February.  Largely this was done by a keto diet.    7. Encounter for vitamin deficiency screening  Monitor  - CBC WITH DIFFERENTIAL; Future  - VITAMIN D,25 HYDROXY; Future    8. Need for vaccination  I have placed the below orders and discussed them with an approved delegating provider. The MA is performing the below orders under the direction of an office MD. ivania  -Given today.  - Influenza Vaccine Quad Injection (PF)    9. Smoker  Previously was able to quit with Chantix.  Started smoking again.  Interested in trying again.  She understands to stop if her mood worsens.  - varenicline (CHANTIX STARTING MONTH PAK) 0.5 MG X 11 & 1 MG X 42 tablet; Per starter pack  Dispense: 56 Tab; Refill: 3

## 2019-10-22 PROBLEM — R74.01 ELEVATED ALT MEASUREMENT: Status: ACTIVE | Noted: 2019-10-22

## 2019-10-22 PROBLEM — E66.01 MORBID OBESITY WITH BMI OF 40.0-44.9, ADULT (HCC): Status: RESOLVED | Noted: 2017-01-16 | Resolved: 2019-10-22

## 2019-11-27 ENCOUNTER — PATIENT MESSAGE (OUTPATIENT)
Dept: MEDICAL GROUP | Facility: MEDICAL CENTER | Age: 29
End: 2019-11-27

## 2019-11-27 DIAGNOSIS — F33.1 MAJOR DEPRESSIVE DISORDER, RECURRENT EPISODE, MODERATE (HCC): ICD-10-CM

## 2019-11-27 DIAGNOSIS — F31.9 BIPOLAR DISORDER WITH DEPRESSION (HCC): ICD-10-CM

## 2019-11-28 RX ORDER — QUETIAPINE FUMARATE 50 MG/1
50 TABLET, FILM COATED ORAL DAILY
Qty: 30 TAB | Refills: 1 | Status: SHIPPED
Start: 2019-11-28 | End: 2020-01-20

## 2019-11-28 RX ORDER — CARBAMAZEPINE 200 MG/1
200 TABLET, EXTENDED RELEASE ORAL 2 TIMES DAILY
Qty: 60 TAB | Refills: 1 | Status: SHIPPED | OUTPATIENT
Start: 2019-11-28 | End: 2020-02-07

## 2019-11-28 RX ORDER — ZIPRASIDONE HYDROCHLORIDE 20 MG/1
20 CAPSULE ORAL DAILY
Qty: 30 CAP | Refills: 1 | Status: SHIPPED
Start: 2019-11-28 | End: 2020-01-20

## 2019-12-16 ENCOUNTER — OFFICE VISIT (OUTPATIENT)
Dept: MEDICAL GROUP | Facility: MEDICAL CENTER | Age: 29
End: 2019-12-16
Payer: COMMERCIAL

## 2019-12-16 VITALS
OXYGEN SATURATION: 98 % | HEART RATE: 82 BPM | DIASTOLIC BLOOD PRESSURE: 66 MMHG | WEIGHT: 174 LBS | SYSTOLIC BLOOD PRESSURE: 112 MMHG | HEIGHT: 68 IN | RESPIRATION RATE: 16 BRPM | BODY MASS INDEX: 26.37 KG/M2 | TEMPERATURE: 97.3 F
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
## 2019-12-16 DIAGNOSIS — M54.9 ACUTE BACK PAIN, UNSPECIFIED BACK LOCATION, UNSPECIFIED BACK PAIN LATERALITY: ICD-10-CM

## 2019-12-16 DIAGNOSIS — F31.9 BIPOLAR DISORDER WITH DEPRESSION (HCC): ICD-10-CM

## 2019-12-16 PROBLEM — E66.9 OBESITY (BMI 35.0-39.9 WITHOUT COMORBIDITY): Status: RESOLVED | Noted: 2019-02-04 | Resolved: 2019-12-16

## 2019-12-16 PROCEDURE — 99214 OFFICE O/P EST MOD 30 MIN: CPT | Performed by: NURSE PRACTITIONER

## 2019-12-16 RX ORDER — LISDEXAMFETAMINE DIMESYLATE 70 MG/1
70 CAPSULE ORAL EVERY MORNING
Qty: 30 CAP | Refills: 2 | Status: SHIPPED
Start: 2019-12-16 | End: 2019-12-17 | Stop reason: SDUPTHER

## 2019-12-16 SDOH — HEALTH STABILITY: MENTAL HEALTH: HOW MANY STANDARD DRINKS CONTAINING ALCOHOL DO YOU HAVE ON A TYPICAL DAY?: 1 OR 2

## 2019-12-16 SDOH — HEALTH STABILITY: MENTAL HEALTH: HOW OFTEN DO YOU HAVE A DRINK CONTAINING ALCOHOL?: MONTHLY OR LESS

## 2019-12-16 ASSESSMENT — PAIN SCALES - GENERAL: PAINLEVEL: 5=MODERATE PAIN
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
## 2019-12-17 ENCOUNTER — PATIENT MESSAGE (OUTPATIENT)
Dept: MEDICAL GROUP | Facility: MEDICAL CENTER | Age: 29
End: 2019-12-17

## 2019-12-17 DIAGNOSIS — F31.9 BIPOLAR DISORDER WITH DEPRESSION (HCC): ICD-10-CM

## 2019-12-17 RX ORDER — LISDEXAMFETAMINE DIMESYLATE 70 MG/1
70 CAPSULE ORAL EVERY MORNING
Qty: 30 CAP | Refills: 0 | Status: SHIPPED | OUTPATIENT
Start: 2020-02-14 | End: 2020-01-20 | Stop reason: SDUPTHER

## 2019-12-17 RX ORDER — LISDEXAMFETAMINE DIMESYLATE 70 MG/1
70 CAPSULE ORAL EVERY MORNING
Qty: 30 CAP | Refills: 0 | Status: SHIPPED | OUTPATIENT
Start: 2020-01-15 | End: 2019-12-17 | Stop reason: SDUPTHER

## 2019-12-17 RX ORDER — LISDEXAMFETAMINE DIMESYLATE 70 MG/1
70 CAPSULE ORAL EVERY MORNING
Qty: 30 CAP | Refills: 2 | Status: SHIPPED
Start: 2019-12-17 | End: 2019-12-17 | Stop reason: SDUPTHER

## 2019-12-17 RX ORDER — LISDEXAMFETAMINE DIMESYLATE 70 MG/1
70 CAPSULE ORAL EVERY MORNING
Qty: 30 CAP | Refills: 2 | Status: SHIPPED | OUTPATIENT
Start: 2019-12-17 | End: 2019-12-17 | Stop reason: SDUPTHER

## 2019-12-17 RX ORDER — LISDEXAMFETAMINE DIMESYLATE 70 MG/1
70 CAPSULE ORAL EVERY MORNING
Qty: 30 CAP | Refills: 0 | Status: SHIPPED | OUTPATIENT
Start: 2019-12-17 | End: 2019-12-17 | Stop reason: SDUPTHER

## 2019-12-17 NOTE — TELEPHONE ENCOUNTER
From: Ryanne Story  To: OSVALDO Otero  Sent: 12/17/2019 2:25 PM PST  Subject: Prescription Question    I called my pharmacy to see if they had my Vyvanse ready but they said the prescription didn’t go through because it needs to be “e-scribed” or taken in. I had no idea! Is there a way you can send it in again through whatever the e-scribe thing is? Thanks!

## 2019-12-18 NOTE — PATIENT COMMUNICATION
Pharmacy states they absolutely cannot take faxed form of this rx. The can take e-prescription or patient can physically bring them in to the pharmacy. I left voicemail for pt to pick rx's up and sent mychart.

## 2020-01-20 ENCOUNTER — OFFICE VISIT (OUTPATIENT)
Dept: MEDICAL GROUP | Facility: MEDICAL CENTER | Age: 30
End: 2020-01-20
Payer: COMMERCIAL

## 2020-01-20 VITALS
DIASTOLIC BLOOD PRESSURE: 72 MMHG | RESPIRATION RATE: 14 BRPM | OXYGEN SATURATION: 98 % | WEIGHT: 235 LBS | TEMPERATURE: 98.1 F | HEART RATE: 101 BPM | SYSTOLIC BLOOD PRESSURE: 118 MMHG | BODY MASS INDEX: 35.61 KG/M2 | HEIGHT: 68 IN

## 2020-01-20 DIAGNOSIS — F31.9 BIPOLAR DISORDER WITH DEPRESSION (HCC): ICD-10-CM

## 2020-01-20 PROCEDURE — 99214 OFFICE O/P EST MOD 30 MIN: CPT | Performed by: NURSE PRACTITIONER

## 2020-01-20 RX ORDER — LISDEXAMFETAMINE DIMESYLATE 70 MG/1
70 CAPSULE ORAL EVERY MORNING
Qty: 30 CAP | Refills: 0 | Status: SHIPPED | OUTPATIENT
Start: 2020-02-19 | End: 2020-03-20

## 2020-01-20 RX ORDER — LISDEXAMFETAMINE DIMESYLATE 70 MG/1
70 CAPSULE ORAL EVERY MORNING
Qty: 30 CAP | Refills: 0 | Status: SHIPPED | OUTPATIENT
Start: 2020-01-20 | End: 2020-02-19

## 2020-01-21 NOTE — PROGRESS NOTES
CC: Medication Refill        HPI:     Ryanne presents today for the followin. Bipolar disorder with depression (HCC)  Here today following up on her Vyvanse prescription.  I saw her last month and gave her 3 months worth however there was a problem with the pharmacy and she needs to have the last 2 months on written prescription to bring in.  She is been doing well with this medication.  Was started by outside psychiatry.  She is currently in between psychiatrists and has an appointment to establish care at the end of February.    In terms of psychiatric medication she is still taking Geodon and Tegretol.  No longer taking Lamictal or Seroquel      2.  Smoker  Has had less success this time around with her Chantix.  States she is been getting nausea with it.  Last time she did not have any nausea it just helped her quit smoking.  She has tried just taking one with dinner however she still gets nauseous.  Has not tried taking it before bedtime.    Current Outpatient Medications   Medication Sig Dispense Refill   • [START ON 2020] VYVANSE 70 MG capsule Take 1 Cap by mouth every morning for 30 days. 30 Cap 0   • VYVANSE 70 MG capsule Take 1 Cap by mouth every morning for 30 days. 30 Cap 0   • carbamazepine SR (TEGRETOL XR) 200 MG TABLET SR 12 HR extended-release tablet Take 1 Tab by mouth 2 times a day. 60 Tab 1   • varenicline (CHANTIX STARTING MONTH ) 0.5 MG X 11 & 1 MG X 42 tablet Per starter pack 56 Tab 3   • spironolactone (ALDACTONE) 100 MG Tab Take 1 Tab by mouth every day. 90 Tab 2   • ziprasidone (GEODON) 20 MG Cap Take 20 mg by mouth 2 Times a Day.     • fluticasone (FLONASE) 50 MCG/ACT nasal spray Spray 2 Sprays in nose every day. 1 Bottle 11     No current facility-administered medications for this visit.      Social History     Tobacco Use   • Smoking status: Current Every Day Smoker     Packs/day: 0.00     Types: Cigarettes   • Smokeless tobacco: Never Used   • Tobacco comment: nicotine  "replacmeent didnt work; insurance wont pay for chatix   Substance Use Topics   • Alcohol use: Yes     Alcohol/week: 0.5 oz     Types: 10 - 14 Cans of beer, 1 Standard drinks or equivalent per week     Frequency: Monthly or less     Drinks per session: 1 or 2     Comment: occasionally   • Drug use: No     I reviewed patients allergies, problem list and medications today in [facility].    ROS: Any/all pertinent positives listed in the HPI, otherwise all others reviewed are negative today.      /72 (BP Location: Left arm, Patient Position: Sitting, BP Cuff Size: Adult)   Pulse (!) 101   Temp 36.7 °C (98.1 °F) (Temporal)   Resp 14   Ht 1.727 m (5' 8\")   Wt 106.6 kg (235 lb)   SpO2 98%   BMI 35.73 kg/m²     Exam:    Gen: Alert and oriented, No apparent distress. WDWN  Psych: A+Ox3, normal affect and mood  Skin: Warm, dry and intact. Good turgor   No rashes in visible areas.  Eye: Conjunctiva clear, lids normal  ENMT: Lips without lesions, good dentition  Lungs: Clear to auscultation bilaterally, no rales or rhonchi   Unlabored respiratory effort.   CV: Regular rate and rhythm, S1, S2. No murmurs.   No Edema      Assessment and Plan.   29 y.o. female with the following issues.    1. Bipolar disorder with depression (HCC)   reviewed from state pharmacy database-Medications found to be medically necessary/appropriate.  Doing well.  Given written prescriptions with fill dates for these medications.  She will follow-up with her new psychiatrist as scheduled to establish care in February.  - VYVANSE 70 MG capsule; Take 1 Cap by mouth every morning for 30 days.  Dispense: 30 Cap; Refill: 0  - VYVANSE 70 MG capsule; Take 1 Cap by mouth every morning for 30 days.  Dispense: 30 Cap; Refill: 0      2.  Smoker  She could try half or whole pill at night before bed.    "

## 2020-02-07 RX ORDER — CARBAMAZEPINE 200 MG/1
TABLET, EXTENDED RELEASE ORAL
Qty: 180 TAB | Refills: 0 | Status: SHIPPED | OUTPATIENT
Start: 2020-02-07 | End: 2020-04-03

## 2020-02-07 NOTE — TELEPHONE ENCOUNTER
Received request via: Patient    Was the patient seen in the last year in this department? No     Does the patient have an active prescription (recently filled or refills available) for medication(s) requested? yes

## 2020-02-21 ENCOUNTER — OFFICE VISIT (OUTPATIENT)
Dept: BEHAVIORAL HEALTH | Facility: CLINIC | Age: 30
End: 2020-02-21
Payer: COMMERCIAL

## 2020-02-21 VITALS
DIASTOLIC BLOOD PRESSURE: 95 MMHG | BODY MASS INDEX: 36.67 KG/M2 | SYSTOLIC BLOOD PRESSURE: 148 MMHG | HEART RATE: 120 BPM | WEIGHT: 241.2 LBS

## 2020-02-21 DIAGNOSIS — Z79.899 ENCOUNTER FOR LONG-TERM (CURRENT) USE OF OTHER MEDICATIONS: ICD-10-CM

## 2020-02-21 DIAGNOSIS — F16.90 HALLUCINOGEN USE: ICD-10-CM

## 2020-02-21 DIAGNOSIS — R41.840 DISTURBED CONCENTRATION: ICD-10-CM

## 2020-02-21 DIAGNOSIS — F50.81 BINGE-EATING DISORDER, IN PARTIAL REMISSION, MILD: ICD-10-CM

## 2020-02-21 DIAGNOSIS — F33.41 RECURRENT MAJOR DEPRESSIVE DISORDER, IN PARTIAL REMISSION (HCC): ICD-10-CM

## 2020-02-21 DIAGNOSIS — F10.21 ALCOHOL USE DISORDER, MODERATE, IN SUSTAINED REMISSION (HCC): ICD-10-CM

## 2020-02-21 DIAGNOSIS — F17.200 NICOTINE USE DISORDER: ICD-10-CM

## 2020-02-21 DIAGNOSIS — F12.90 MARIJUANA USE: ICD-10-CM

## 2020-02-21 PROBLEM — F50.814: Status: ACTIVE | Noted: 2020-02-21

## 2020-02-21 PROCEDURE — 99204 OFFICE O/P NEW MOD 45 MIN: CPT | Mod: GC | Performed by: PSYCHIATRY & NEUROLOGY

## 2020-02-21 RX ORDER — ZIPRASIDONE HYDROCHLORIDE 20 MG/1
20 CAPSULE ORAL
Qty: 30 CAP | Refills: 1 | Status: SHIPPED | OUTPATIENT
Start: 2020-02-21 | End: 2020-04-03

## 2020-02-21 SDOH — HEALTH STABILITY: MENTAL HEALTH: HOW OFTEN DO YOU HAVE 6 OR MORE DRINKS ON ONE OCCASION?: NEVER

## 2020-02-21 ASSESSMENT — PATIENT HEALTH QUESTIONNAIRE - PHQ9
CLINICAL INTERPRETATION OF PHQ2 SCORE: 2
5. POOR APPETITE OR OVEREATING: 1 - SEVERAL DAYS
SUM OF ALL RESPONSES TO PHQ QUESTIONS 1-9: 10

## 2020-02-22 NOTE — PROGRESS NOTES
PSYCHIATRIC Evaluation:    Requesting Provider: OSVALDO Otero  Supervising Physician: Dr. Alvarez  Information Obtained From: Patient    Chief Complaint:   Chief Complaint   Patient presents with   • New Patient       ID: 30 y.o. with hx of bipolar unspecified, ADHD, binge eating disorder and depression    Current psychotropics  Geodon 20 mg PO HS (reviewed taking with food with pt, need for labs, risk of metabolic syndrome and TD)  Vyvanse 70 mg PO daily (reviewed risk of possible worsening mood symptoms with pt and concern with substance use hx)  Carbamazepine 200 mg PO BID (reviewed need for lab monitoring, teratogenic risk, and decreased hormonal birth control efficacy)      HPI:     Patient reports significant psychiatric history and frequent medication changes over the last 2 to 3 years.  Patient reports she has been told she has bipolar disorder, ADHD, binge eating disorder and depression.  Patient denies a specific history of bhargav; endorses potential episodes of hypomania including elevated mood but not grandiosity or euphoria, increased productivity but not disorganization without being hyperverbal or change in general sleep quantity or impulsivity lasting approximately 1 week.  Patient endorses recurrent depressive episodes since her teen years treated with multiple different medications.  Patient endorses some residual depressive symptoms Pt endorses some residual depressive symptoms including initial insomnia she is managing with sleep hygiene, problems with concentration which she feels are decently managed with Vyvanse.  Patient denies problems with fatigue or low energy feeling tired during the day; however, patient reported difficulty with feeling tired and having low energy on the PHQ 9.  Patient versus a history of psychotic symptoms including auditory command hallucinations to kill herself and visual hallucinations of dark dreary shadows which only ever occurred during severe  "depressive episodes.  Patient reports that she never had problems with focus or concentration going through school or college; reports that she \"spoke to her doctor and they told her she had ADHD\" but she cannot state what specific symptoms she has had at that time.  Patient reports that she does feel better on a stimulant and feels she is more productive; reviewed risk with potential bipolar diagnosis including risk of more frequent cycling and contraindication in setting of substance use and that patient got diagnosis of ADHD concurrent during a time in her life when she was using alcohol excessively and marijuana multiple times per day.  Reviewed with patient consideration for lowering dose in the future or finding alternate treatments.  Patient reports she also has a history of binge eating disorder; patient reports that she has not had any binging episodes in the last 3 months; reports Vyvanse has been helpful; reports she has not \"eaten the whole fridge\" but that her boyfriend will sometimes question her volume of food.  Patient denies compensatory eating behaviors including purging or restricting.    Substance use: Patient endorses past excessive use of alcohol; reports sobriety August 2018 for 5 months followed by now having no more than 1 white claw when she goes out no more than 1-2 times per month.  Patient denies history of DT or seizures; discussed risk of DT and seizure with patient; patient endorsed only withdrawal symptoms were cravings for a few months, tremor, hyperhidrosis, general feeling of dysphoria in the few weeks after stopping drinking a full bottle of hard liquor per day.  Patient endorsed social distress related to her alcohol use; reports boyfriend is going to labor; reports difficulty with focus and concentration during that time.  Patient also endorses excessive use of marijuana in the past including smoking multiples of marijuana per day; states she has decreased her use to 1/8 of the " "lower lasting approximately 1 month; patient also uses vape pen with 95% THC; patient not amenable to discontinuation reviewed risk of potentially worsening psychosis/anxiety/depressive symptoms; reviewed from adduction model of decreasing THC content, less frequent use, less potent use.  Patient endorses occasional use of cocaine including on her birthday \"I given peer pressure\"; reviewed with patient significant risk when combined with Vyvanse; reviewed recommendation of complete sobriety from illicit substances.  Patient also endorses using mushrooms/hallucinogens whenever she attends a concert; denies plan to decrease or change this at this time.    Reviewed with patient that her past diagnosis bipolar disorder and ADHD are unclear due to history of substance use during these concurrent diagnoses; encourage patient to consider sobriety.  Patient was amenable to obtaining records from past provider.  Patient is amenable to harm reduction of marijuana and alcohol.  Patient is amenable to continuing current psychotropics with plan to consider for alternate treatment once lab work is obtained.  Patient has found benefit with talk therapy; has therapist available by chat/text; amenable to continue this.    On psych ROS, pt denies symptoms of anxiety that interfere with functioning or are overwhelming, denies OCD symptoms, denies trauma related symptoms, see HPI for depressive symptoms, see HPI for manic symptoms and see HPI for psychotic symptoms     Depression Screen (PHQ-2/PHQ-9) 7/25/2017 9/30/2019 2/21/2020   PHQ-2 Total Score - 4 -   PHQ-2 Total Score - - -   PHQ-2 Total Score 2 - 2   PHQ-9 Total Score - 15 -   PHQ-9 Total Score - - -   PHQ-9 Total Score 20 - 10       Interpretation of PHQ-9 Total Score   Score Severity   1-4 No Depression   5-9 Mild Depression   10-14 Moderate Depression   15-19 Moderately Severe Depression   20-27 Severe Depression    Medical Review of Systems: as reported by pt. All systems " "reviewed.   General: see HPI for sleep/eat/energy  GI: denies nausea/vomiting/diarrhea/constipation  Neuro: denies seizure, tremor  Psych: see HPI,denies si/HI      Medical Conditions:   Past Medical History:   Diagnosis Date   • Anxiety    • Depression     vibyrd and effexor didnt work that well.   • Hyperlipidemia      TBIs: denies  SZs: denies  Strokes: denies  Thyroid: denies  Diabetes: denies  Cardiovascular disease: denies          Objective:  Blood pressure 148/95, pulse (!) 120, weight 109.4 kg (241 lb 3.2 oz), not currently breastfeeding.           MSE :     Appearance: obese, appears stated age, clean   Behavior/Motor: calm, cooperative, pleasant, engaged. good eye contact.  No tics. No mannerisms.   Speech : normal rate, normal volume, normal tone, no slurring of speech and speech is clear and understandable; with appropriate prosody, and inflection   Language: English with fluent and good vocabulary    Mood: \"okay\"   Affect: euthymic, mood congruent and full range of affect   Thought Process: linear, clear, coherent; without loosening of association; without thought blocking; goal-oriented   Thought Content: no delusions, paranoia, obsessions, phobias, or overvalued ideas noted; denies intrusive thoughts   Perceptions: denies a/v hallucinations; not seen to respond to internal stimuli; not internally preoccupied   Attention/Concentration: intact to conversation; did not require reorientation to questions   Memory: no gross deficits   Orientation: alert and grossly oriented to person, place, situation, and time    Neurological: Deferred   Fund of Knowledge: appropriate to interview based on syntax   Insight/Judgment: good / good based on ability to discuss health   SI/HI: Reliably denies suicidal and homicidal ideations       Past Psychiatric Hx: Binge eating, ADHD, BP, Depression  Psychiatric hospitalizations: Denies  Past suicide attempts: Denies  Previous psychiatric medications:  Therapy: in talk " "therapy via chat/text message  Self harm: cutting age 14 yrs  Vyvanse: decreased binging, improved focus, increase nicotine  Geodon: resolved avh  Tegratol: current use, finds benefit  Lamictal: , found benefit unclear why stopped  Phentermine+Topamax: weight lose  Zoloft: increase SI  Prozac: ineffective  Chantix: works sometimes  Vybrid: irritable  Effexor: ineffective  Seroquel 25 mg PRN for sleep; past benefit  Ritalin: not effective    Family Psychiatric Hx:  Family History   Problem Relation Age of Onset   • Genetic Disorder Mother         Overweight   • Heart Disease Father 35        MI   • Hypertension Father    • Hyperlipidemia Father    • Psychiatric Illness Maternal Grandmother         Alzheimer   • Cancer Maternal Grandmother         Breast   • Dementia Maternal Grandmother    • Psychiatric Illness Maternal Grandfather         Alzheimers vs \"crazy\"   • Cancer Paternal Grandmother         Breast   • Dementia Paternal Grandmother    • Arthritis Paternal Grandfather         gout   • Alcohol abuse Neg Hx    • Drug abuse Neg Hx    • Schizophrenia Neg Hx    • Paranoid behavior Neg Hx    • Suicide Attempts Neg Hx      Social Hx:  Lives with boyfriend  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 Findings of potential interest to psychiatry are noted below:    Past Medical History:   Diagnosis Date   • Anxiety    • Depression     vibyrd and effexor " didnt work that well.   • Hyperlipidemia          Medications:   Current Outpatient Medications   Medication Sig Dispense Refill   • carbamazepine SR (TEGRETOL XR) 200 MG TABLET SR 12 HR extended-release tablet TAKE 1 TABLET BY MOUTH TWICE DAILY 180 Tab 0   • VYVANSE 70 MG capsule Take 1 Cap by mouth every morning for 30 days. 30 Cap 0   • varenicline (CHANTIX STARTING MONTH JERICHO) 0.5 MG X 11 & 1 MG X 42 tablet Per starter pack 56 Tab 3   • spironolactone (ALDACTONE) 100 MG Tab Take 1 Tab by mouth every day. 90 Tab 2   • ziprasidone (GEODON) 20 MG Cap Take 20 mg by mouth 2 Times a Day.     • fluticasone (FLONASE) 50 MCG/ACT nasal spray Spray 2 Sprays in nose every day. 1 Bottle 11     No current facility-administered medications for this visit.        Allergies:   Zoloft    Labs:   None recent    ECG: ECG not performed for this encounter    Imaging: No recent head, neck, or brain imagin      ASSESSMENT:  Pt is a 30 y.o. WF with reported hx of BP, ADHD, Binge eating disorder, depression, nicotine use, MJ use, hallucinogen use, rare cocaine use, EtOH use disorder sustained remission. Pt amenable to obtaining records from last provider, continuing therapy, obtaining lab work for monitoring, and decreasing MJ use. Will monitor other illicit substance use. Pt hopes to decrease nicotine use; on Chantix from other provider. Pt amenable to further discussion of treatment options once records and lab work obtained. Continue current medications at this time.       Risk Assessment:  Acute danger to self: Low, in follow up care, no active SI or intent/plans.  Chronic danger to self: elevated due to psychiatric illness.  Danger to others: denies homicidal ideations  Grave Disability: not applicable  Guns: Denies access  Emergency plan reviewed: call 911 or report to ED if suicidal.     DDX/Plan:  #MDD, recurrent, partial remisson   hx of psychotic features (rule out bipolar disorder type II, substance induced psychotic  disorder)  Continue current medications:  Geodon 20 mg PO with evening meal (mae provide refill)  Carbamazepine 200 mg PO BID (pt states does not need refill at this time)    #long term medication monitoring  Carbamazepine level  Lipids  CMP  CBC with diff  TSH with reflex T4      #Disturbed concentration,      not meeting full ADHD criteria this appointment will get records  #binge eating disorder, partial remission, mild     continue to monitor  Continue Vyvanse 70 mg PO daily  Pt insurance does not cover Rx, Pt notes side effect of increased smoking; discussed plan to taper to lowest effective or trial alternate forms of treatment to be discussed further at next appointment   reviewed, pt has 1 more refill from alternate provider to last until seen again    #Marijuana use  Most days per week  Recommend discontinue; Harm reduction model of tx: pt will trial decrease/taper/lower THC and flower instead of vaping    #EtOH use disorder in sustained remission  May 2018, pt became sober for 5 m  Now drinks 1-2 drinks per month  Reviewed withdrawal risk with pt in case of recurrence  Denies cravings, no specific tx at this time  Encourage continued minimal or zero use of EtOH    #hallucinogenic use  Encourage pt to not use any illicit substances  Pt occasionally uses mushrooms    #nicotine use  Pt on Chantix  Denies side effects  Usually do not recommend use in pt with hx of SI and psychiatric illness, but pt reports no side effects; provided by alternate provider  Pt states still smoking about 1 ppd     #comment only; stimulant use  Encourage pt to never use cocaine, especially in combination with medications due to significant risk of interactions  Pt reports infrequent-rare use of cocaine            -Discussed risks, benefits, side effects, and alternatives of recommended treatment with patient.  - f/u in 1 month(s) or sooner if needed          This note was created using voice recognition software (Dragon). The  accuracy of the dictation is limited by the abilities of the software. I have reviewed the note prior to signing; however, some errors in grammar and context are still possible. If you have any questions related to this note, please do not hesitate to contact our office.     Discussed case, assessment, and plan with my attending, Dr. Alvarez, who was able to personally see and evaluate the patient.

## 2020-03-24 ENCOUNTER — APPOINTMENT (OUTPATIENT)
Dept: BEHAVIORAL HEALTH | Facility: CLINIC | Age: 30
End: 2020-03-24
Payer: COMMERCIAL

## 2020-04-03 ENCOUNTER — OFFICE VISIT (OUTPATIENT)
Dept: BEHAVIORAL HEALTH | Facility: CLINIC | Age: 30
End: 2020-04-03
Payer: COMMERCIAL

## 2020-04-03 DIAGNOSIS — F50.81 BINGE-EATING DISORDER, IN PARTIAL REMISSION, MILD: ICD-10-CM

## 2020-04-03 DIAGNOSIS — F33.41 RECURRENT MAJOR DEPRESSIVE DISORDER, IN PARTIAL REMISSION (HCC): ICD-10-CM

## 2020-04-03 RX ORDER — LISDEXAMFETAMINE DIMESYLATE 70 MG/1
70 CAPSULE ORAL EVERY MORNING
Qty: 30 CAP | Refills: 0 | Status: SHIPPED | OUTPATIENT
Start: 2020-04-03 | End: 2020-04-03

## 2020-04-03 RX ORDER — ZIPRASIDONE HYDROCHLORIDE 20 MG/1
20 CAPSULE ORAL
Qty: 30 CAP | Refills: 3 | Status: SHIPPED | OUTPATIENT
Start: 2020-04-03 | End: 2020-06-05

## 2020-04-03 RX ORDER — LISDEXAMFETAMINE DIMESYLATE 70 MG/1
70 CAPSULE ORAL EVERY MORNING
Qty: 30 CAP | Refills: 0 | Status: SHIPPED | OUTPATIENT
Start: 2020-05-03 | End: 2020-06-02

## 2020-04-03 RX ORDER — CARBAMAZEPINE 200 MG/1
200 TABLET, EXTENDED RELEASE ORAL 2 TIMES DAILY
Qty: 30 TAB | Refills: 3 | Status: SHIPPED | OUTPATIENT
Start: 2020-04-03 | End: 2020-06-11 | Stop reason: SDUPTHER

## 2020-04-03 NOTE — PROGRESS NOTES
Telephone Appointment Visit   As a means of avoiding spread of COVID-19, this visit is being conducted by telephone. This telephone visit was initiated by the patient and they verbally consented.    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  Denies any side effects with her current medications.  Her appetite and binge eating has been stable, says that some days she will want to eat, but it is not too bad overall.  She did keto diet over the summer and wants to go back to that.  Still needs labs done.  She did quit smoking as she is back on Chantix in early March.    Labs / Images Reviewed   N/A    Assessment and Plan:     1. Binge-eating disorder, in partial remission, mild  - lisdexamfetamine (VYVANSE) 70 MG capsule; Take 1 Cap by mouth every morning for 30 days.  Dispense: 30 Cap; Refill: 0    2. Recurrent major depressive disorder, in partial remission (HCC)    Other orders  - ziprasidone (GEODON) 20 MG Cap; Take 1 Cap by mouth with dinner.  Dispense: 30 Cap; Refill: 3  - carbamazepine SR (TEGRETOL XR) 200 MG TABLET SR 12 HR extended-release tablet; Take 1 Tab by mouth 2 times a day.  Dispense: 30 Tab; Refill: 3    - Continue Geodon 20 mg daily with meals  - Continue carbamazepine  mg BID  - Continue Vyvanse 70 mg daily; 2 scripts written today    Follow-up: 2 months    Time at end of call: 11:13  Total Time Spent: 11-20 minutes    Chiquita Flores M.D.

## 2020-05-07 RX ORDER — SPIRONOLACTONE 100 MG/1
100 TABLET, FILM COATED ORAL DAILY
Qty: 90 TAB | Refills: 2 | Status: SHIPPED | OUTPATIENT
Start: 2020-05-07 | End: 2021-02-01 | Stop reason: SDUPTHER

## 2020-06-01 ENCOUNTER — HOSPITAL ENCOUNTER (OUTPATIENT)
Dept: LAB | Facility: MEDICAL CENTER | Age: 30
End: 2020-06-01
Attending: SPECIALIST
Payer: COMMERCIAL

## 2020-06-01 PROCEDURE — 87591 N.GONORRHOEAE DNA AMP PROB: CPT

## 2020-06-01 PROCEDURE — 87491 CHLMYD TRACH DNA AMP PROBE: CPT

## 2020-06-02 LAB
C TRACH DNA SPEC QL NAA+PROBE: NEGATIVE
N GONORRHOEA DNA SPEC QL NAA+PROBE: NEGATIVE
SPECIMEN SOURCE: NORMAL

## 2020-06-05 ENCOUNTER — TELEMEDICINE (OUTPATIENT)
Dept: BEHAVIORAL HEALTH | Facility: CLINIC | Age: 30
End: 2020-06-05
Payer: COMMERCIAL

## 2020-06-05 VITALS — WEIGHT: 220 LBS | BODY MASS INDEX: 33.34 KG/M2 | HEIGHT: 68 IN

## 2020-06-05 DIAGNOSIS — F50.81 BINGE-EATING DISORDER, IN PARTIAL REMISSION, MILD: ICD-10-CM

## 2020-06-05 DIAGNOSIS — F33.41 RECURRENT MAJOR DEPRESSIVE DISORDER, IN PARTIAL REMISSION (HCC): ICD-10-CM

## 2020-06-05 PROCEDURE — 99213 OFFICE O/P EST LOW 20 MIN: CPT | Mod: GC,95,CR | Performed by: PSYCHIATRY & NEUROLOGY

## 2020-06-05 RX ORDER — LISDEXAMFETAMINE DIMESYLATE CAPSULES 70 MG/1
70 CAPSULE ORAL EVERY MORNING
Qty: 30 CAP | Refills: 0 | Status: SHIPPED | OUTPATIENT
Start: 2020-07-05 | End: 2020-08-04

## 2020-06-05 RX ORDER — LISDEXAMFETAMINE DIMESYLATE CAPSULES 70 MG/1
70 CAPSULE ORAL EVERY MORNING
Qty: 30 CAP | Refills: 0 | Status: SHIPPED | OUTPATIENT
Start: 2020-08-04 | End: 2020-08-11

## 2020-06-05 RX ORDER — LISDEXAMFETAMINE DIMESYLATE CAPSULES 70 MG/1
70 CAPSULE ORAL EVERY MORNING
Qty: 30 CAP | Refills: 0 | Status: SHIPPED | OUTPATIENT
Start: 2020-06-05 | End: 2020-07-05

## 2020-06-05 NOTE — PROGRESS NOTES
[facility]  PSYCHIATRIC FOLLOW-UP NOTE      Reason for visit / chief complaint:   Chief Complaint   Patient presents with   • Follow-Up       SUBJECTIVE / HPI:  Ryanne reports that she is continuing to do reasonably well, her mood remains stable. Sleep remains good. She has talked with Dr. Worrell previously about tapering down her medications as she would like to see if she doesn't require as much. When she was with Dr. Fu's group she says that she was still drinking alcohol and using cannabis regularly, whereas now she has been eating a lot healthier and exercising. She remains unsure of the bipolar disorder diagnosis. Discussed tapering off her Geodon first as it is a small dose and she still has Tegetrol on board as a mood stabilizer if she does have underlying bipolar. She is agreeable to this plan.      Psychiatric/Medical ROS:  Depression: Denies SI  Ammy: Denies decreased need for sleep, flight of ideas  Anxiety: Denies panic attacks  Consitutional: Denies fevers/chills, weight changes  Cardiac: Denies chest pain, SOB, palpitations  GI: Denies nausea/vomiting, abdominal pain    Medications:  Current Outpatient Medications on File Prior to Visit   Medication Sig Dispense Refill   • spironolactone (ALDACTONE) 100 MG Tab Take 1 Tab by mouth every day. 90 Tab 2   • ziprasidone (GEODON) 20 MG Cap Take 1 Cap by mouth with dinner. 30 Cap 3   • carbamazepine SR (TEGRETOL XR) 200 MG TABLET SR 12 HR extended-release tablet Take 1 Tab by mouth 2 times a day. 30 Tab 3   • varenicline (CHANTIX STARTING MONTH JERICHO) 0.5 MG X 11 & 1 MG X 42 tablet Per starter pack 56 Tab 3   • fluticasone (FLONASE) 50 MCG/ACT nasal spray Spray 2 Sprays in nose every day. 1 Bottle 11     No current facility-administered medications on file prior to visit.          Past Medications:  Per Dr. Worrell's intake from 2/21/20:  Vyvanse: decreased binging, improved focus, increase nicotine  Geodon: resolved avh  Tegratol: current  "use, finds benefit  Lamictal: , found benefit unclear why stopped  Phentermine+Topamax: weight lose  Zoloft: increase SI  Prozac: ineffective  Chantix: works sometimes  Vybrid: irritable  Effexor: ineffective  Seroquel 25 mg PRN for sleep; past benefit  Ritalin: not effective      Social History:  Per Dr. 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    Psychiatric Examination:   Appearance:  female appropriately dressed and groomed  Behavior: Open, cooperative  Thought Content: No SI/HI, no AVH  Thought Process: Linear, goal-directed  Speech: Normal rate and rhythm  Mood: \"Pretty good\"          Affect: Euthymic          Attention/Alertness: Attends well to interview  Orientation/Memory: Grossly intact  Insight/Judgement: Fair/fair       ASSESSMENT:  This is a 30 year old  female with history of major depressive disorder versus Bipolar II disorder or substance induced psychotic disorder, stable on her current regimen but would like to try tapering down on her medications given some doubt about the accuracy of her bipolar diagnosis.    # Recurrent major depressive disorder, in partial remission  # Binge-eating disorder, in partial remission, mild    PLAN:  - Decrease Geodon 20 mg to every other day x 1 week, then DC  - Continue carbamazepine  mg BID  - Continue Vyvanse 70 mg daily; 3 scripts electronically sent to pharmacy today      - Counseled patient on the benefits, side effects, and alternatives to treatment " prescribed above.  - RTC in 6-8 weeks with Dr. Alvarez      This note was created using voice recognition software (Dragon). The accuracy of the dictation is limited by the abilities of the software. I have reviewed the note prior to signing, however some errors in grammar and context are still possible. If you have any questions related to this note please do not hesitate to contact our office.

## 2020-06-12 RX ORDER — CARBAMAZEPINE 200 MG/1
200 TABLET, EXTENDED RELEASE ORAL 2 TIMES DAILY
Qty: 60 TAB | Refills: 3 | Status: SHIPPED | OUTPATIENT
Start: 2020-06-12 | End: 2020-09-24 | Stop reason: SDUPTHER

## 2020-06-20 DIAGNOSIS — H93.8X3 PRESSURE SENSATION IN BOTH EARS: ICD-10-CM

## 2020-06-20 DIAGNOSIS — J30.9 ALLERGIC RHINITIS, UNSPECIFIED SEASONALITY, UNSPECIFIED TRIGGER: ICD-10-CM

## 2020-06-21 RX ORDER — FLUTICASONE PROPIONATE 50 MCG
2 SPRAY, SUSPENSION (ML) NASAL DAILY
Qty: 1 BOTTLE | Refills: 11 | Status: SHIPPED | OUTPATIENT
Start: 2020-06-21 | End: 2023-02-18

## 2020-07-14 ENCOUNTER — TELEMEDICINE (OUTPATIENT)
Dept: BEHAVIORAL HEALTH | Facility: CLINIC | Age: 30
End: 2020-07-14
Payer: COMMERCIAL

## 2020-07-14 VITALS — HEIGHT: 68 IN | WEIGHT: 220 LBS | BODY MASS INDEX: 33.34 KG/M2

## 2020-07-14 DIAGNOSIS — F10.21 ALCOHOL USE DISORDER, MODERATE, IN SUSTAINED REMISSION (HCC): ICD-10-CM

## 2020-07-14 DIAGNOSIS — F50.81 BINGE-EATING DISORDER, IN PARTIAL REMISSION, MILD: ICD-10-CM

## 2020-07-14 DIAGNOSIS — F33.0 MDD (MAJOR DEPRESSIVE DISORDER), RECURRENT EPISODE, MILD (HCC): ICD-10-CM

## 2020-07-14 PROBLEM — F10.11 ALCOHOL USE DISORDER, MILD, IN SUSTAINED REMISSION: Status: ACTIVE | Noted: 2020-02-21

## 2020-07-14 PROCEDURE — 99214 OFFICE O/P EST MOD 30 MIN: CPT | Mod: 95,CR | Performed by: PSYCHIATRY & NEUROLOGY

## 2020-07-14 RX ORDER — ESCITALOPRAM OXALATE 10 MG/1
TABLET ORAL
Qty: 21 TAB | Refills: 1 | Status: SHIPPED | OUTPATIENT
Start: 2020-07-14 | End: 2020-08-11

## 2020-07-14 NOTE — PROGRESS NOTES
PSYCHIATRY TELEMEDICINE FOLLOW-UP NOTE      Chief Complaint   Patient presents with   • Follow-Up     mood       This encounter was conducted via Zoom .   Verbal consent was obtained. Patient's identity was verified.    History Of Present Illness:  Ryanne Story is a 30 y.o. old female with major depressive disorder, binge eating disorder comes in today for follow up, was last seen see by Dr. Flores 6 weeks ago.  She has been struggling with increased depression, crying spells and decreased motivation for a few weeks now.  She has been able to get off Geodon and feels that the only thing Geodon helped was hallucinations.  She has had struggles with alcohol and illicit drugs and she was experiencing hallucinations when she was actively drinking heavy amounts of alcohol.  She has not noticed any auditory or visual hallucinations since she has been off Geodon.  She is compliant with Tegretol and Vyvanse.  She feels her parents really helps her with binge eating episodes and she is doing okay with that.  She has been doing well regards to her sleep as well.  She has been in a relationship for about 2 years.  She is having significant stressors in the relationship to the point where she asked him to move out several months ago and they got into a huge fight last week.  She is not sure what the future of this relationship.  She continues to work from home and feels that her work is the only thing that is going well for her.  She denies any thoughts of wanting to hurt herself or others.    grandmother - dying  Bf fight    Social History:   She is currently in a relationship when she lives alone in [place].  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    Substance Use:  Alcohol - Denies binge drinking or daily drinking episodes, drinks white claw every few months  Nicotine - Denies  Illicit drugs - Denies    Past Medication Trials:  Lamictal (effective), Zoloft (s/e - suicidal thoughts), Prozac (ineffective),  "Viibryd (s/e - irritability), Effexor (ineffective), Seroquel 25 mg for sleep (benefit), Ritalin (ineffective)    Medications:  Current Outpatient Medications   Medication Sig Dispense Refill   • fluticasone (FLONASE) 50 MCG/ACT nasal spray Spray 2 Sprays in nose every day. 1 Bottle 11   • carbamazepine SR (TEGRETOL XR) 200 MG TABLET SR 12 HR extended-release tablet Take 1 Tab by mouth 2 times a day. 60 Tab 3   • lisdexamfetamine (VYVANSE) 70 MG capsule Take 1 Cap by mouth every morning for 30 days. 30 Cap 0   • spironolactone (ALDACTONE) 100 MG Tab Take 1 Tab by mouth every day. 90 Tab 2   • [START ON 8/4/2020] lisdexamfetamine (VYVANSE) 70 MG capsule Take 1 Cap by mouth every morning for 30 days. 30 Cap 0     No current facility-administered medications for this visit.        Review Of Systems:    Constitutional - Positive for fatigue  Respiratory - Negative for shortness of breath, cough  CVS - Negative for chest pain, palpitations  GI - Negative for nausea, vomiting, abdominal pain, diarrhea, constipation  Musculoskeletal - Negative for back pain  Neurological - Negative for headaches  Psychiatric - Positive for depression     Physical Examination:  Vital signs: Ht 1.727 m (5' 8\")   Wt 99.8 kg (220 lb)   LMP  (LMP Unknown)   Breastfeeding No   BMI 33.45 kg/m²     Musculoskeletal: No abnormal movements.     Mental Status Evaluation:   General: Young white female, dressed in casual attire, good grooming and hygiene, in no apparent distress, calm and cooperative, good eye contact, no psychomotor agitation or retardation  Orientation: Alert and oriented to person, place and time  Recent and remote memory: Grossly intact  Attention span and concentration: Grossly intact  Speech: Spontaneous, normal rate, rhythm and tone  Thought Process: Linear, logical and goal directed  Thought Content: Denies suicidal or homicidal ideations, intent or plan  Perception: Denies auditory or visual hallucinations. No delusions " "noted  Associations: Intact  Language: Appropriate  Fund of knowledge and vocabulary: Grossly adequate  Mood: \"fine\"  Affect: Dysphoric at times, mood congruent  Insight: Good  Judgment: Good    Depression screening:  Depression Screen (PHQ-2/PHQ-9) 7/25/2017 9/30/2019 2/21/2020   PHQ-2 Total Score - 4 -   PHQ-2 Total Score - - -   PHQ-2 Total Score 2 - 2   PHQ-9 Total Score - 15 -   PHQ-9 Total Score - - -   PHQ-9 Total Score 20 - 10     Interpretation of PHQ-9 Total Score   Score Severity   1-4 No Depression   5-9 Mild Depression   10-14 Moderate Depression   15-19 Moderately Severe Depression   20-27 Severe Depression    Medical Records/Labs/Diagnostic Tests Reviewed:  [facility]  records - appropriate refills, no abuse suspected       Impression:  1.  Major depressive disorder, recurrent, mild - slight worsening    2.  Binge eating disorder, in partial remission - stable  3.  Alcohol use disorder, mild, in sustained remission (no binge drinking or daily/heavy drinking since fall 2019) - stable  4.  History of bipolar mood disorder and psychotic symptoms    Plan:  1.  Start Lexapro 5 mg daily for 2 weeks and then increase to 10 mg daily for depression.  Advised her to watch for any possible hypomanic or manic symptoms since she has previously been diagnosed with bipolar mood disorder.  2.  Continue Tegretol  mg twice daily for mood stabilization  3.  Continue Vyvanse 70 mg for binge eating disorder.  Not refilled today.  4.  Advised her to look for another therapist as she is having significant relationship stressors which seem to be contributing to her mood symptoms as well    Return to clinic in 4 weeks or sooner if symptoms worsen    The proposed treatment plan was discussed with the patient who was provided the opportunity to ask questions and make suggestions regarding alternative treatment. Patient verbalized understanding and expressed agreement with the plan.     Sonja Alvarez M.D.  07/14/20    This " note was created using voice recognition software (Dragon). The accuracy of the dictation is limited by the abilities of the software. I have reviewed the note prior to signing, however some errors in grammar and context are still possible. If you have any questions related to this note please do not hesitate to contact our office.

## 2020-08-11 ENCOUNTER — TELEMEDICINE (OUTPATIENT)
Dept: BEHAVIORAL HEALTH | Facility: CLINIC | Age: 30
End: 2020-08-11
Payer: COMMERCIAL

## 2020-08-11 VITALS — BODY MASS INDEX: 33.34 KG/M2 | WEIGHT: 220 LBS | HEIGHT: 68 IN

## 2020-08-11 DIAGNOSIS — F10.21 ALCOHOL USE DISORDER, MODERATE, IN SUSTAINED REMISSION (HCC): ICD-10-CM

## 2020-08-11 DIAGNOSIS — F33.0 MDD (MAJOR DEPRESSIVE DISORDER), RECURRENT EPISODE, MILD (HCC): ICD-10-CM

## 2020-08-11 DIAGNOSIS — F50.81 BINGE-EATING DISORDER, IN PARTIAL REMISSION, MILD: ICD-10-CM

## 2020-08-11 PROBLEM — F31.9 BIPOLAR DISORDER WITH DEPRESSION (HCC): Status: RESOLVED | Noted: 2019-09-30 | Resolved: 2020-08-11

## 2020-08-11 PROCEDURE — 99214 OFFICE O/P EST MOD 30 MIN: CPT | Mod: 95,CR | Performed by: PSYCHIATRY & NEUROLOGY

## 2020-08-11 RX ORDER — ESCITALOPRAM OXALATE 10 MG/1
10 TABLET ORAL DAILY
Qty: 30 TAB | Refills: 0 | Status: SHIPPED | OUTPATIENT
Start: 2020-08-11 | End: 2020-09-24 | Stop reason: SDUPTHER

## 2020-08-11 RX ORDER — LISDEXAMFETAMINE DIMESYLATE CAPSULES 70 MG/1
70 CAPSULE ORAL EVERY MORNING
Qty: 30 CAP | Refills: 0 | Status: SHIPPED | OUTPATIENT
Start: 2020-08-11 | End: 2020-09-15 | Stop reason: SDUPTHER

## 2020-08-11 RX ORDER — VARENICLINE TARTRATE 1 MG/1
TABLET, FILM COATED ORAL
COMMUNITY
Start: 2020-07-31 | End: 2020-09-24

## 2020-08-11 NOTE — PROGRESS NOTES
PSYCHIATRY TELEMEDICINE FOLLOW-UP NOTE      Chief Complaint   Patient presents with   • Follow-Up     depression, anxiety       This encounter was conducted via Zoom .   Verbal consent was obtained. Patient's identity was verified.    History Of Present Illness:  Ryanne Story is a 30 y.o. old female with major depressive disorder, binge eating disorder comes in today for follow up, was last seen seen 4 weeks ago.  She has been doing the same since her last visit with me.  She has been compliant with Lexapro and so far has not noticed any efficacy or side effects.  She is still struggling with some mild depression and lack of motivation.  She and her boyfriend are doing good in regards to the relationship and she even asked him if he has noticed any changes and he has not.  She feels that the relationship is better than before and they both are working on some things.  She has not been able to set up an appointment for therapy as she feels that the last time she did therapy was not beneficial and she had to pay $300 out-of-pocket and she cannot afford that at this point.  She is having some psychosocial stressors which are contributing to her depression.  She has been compliant with both Tegretol and Vyvanse.  She has noticed some struggles with binge eating which she attributes to stress.  She is also noticed some nausea infrequently in the morning.  She has been taking her Lexapro at bedtime.  She denies any struggles with her sleep.  She denies having thoughts of wanting to hurt herself or others.    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    Substance Use:  Alcohol - Denies recent alcohol use  Nicotine - Smokes 4 cigarettes daily, started Chantix recently to help quit smoking nicotine   Illicit drugs - Infrequent cannabis use     Past Medication Trials:  Lamictal (effective), Zoloft (s/e - suicidal thoughts), Prozac  "(ineffective), Viibryd (s/e - irritability), Effexor (ineffective), Seroquel 25 mg for sleep (benefit), Geodon, Ritalin (ineffective)    Medications:  Current Outpatient Medications   Medication Sig Dispense Refill   • CHANTIX CONTINUING MONTH JERICHO 1 MG tablet USE AS DIRECTED     • escitalopram (LEXAPRO) 10 MG Tab Take 1 Tab by mouth every day. 30 Tab 0   • lisdexamfetamine (VYVANSE) 70 MG capsule Take 1 Cap by mouth every morning for 30 days. 30 Cap 0   • fluticasone (FLONASE) 50 MCG/ACT nasal spray Spray 2 Sprays in nose every day. 1 Bottle 11   • carbamazepine SR (TEGRETOL XR) 200 MG TABLET SR 12 HR extended-release tablet Take 1 Tab by mouth 2 times a day. 60 Tab 3   • spironolactone (ALDACTONE) 100 MG Tab Take 1 Tab by mouth every day. 90 Tab 2     No current facility-administered medications for this visit.        Review Of Systems:    Constitutional - Positive for fatigue  Respiratory - Negative for shortness of breath, cough  CVS - Negative for chest pain, palpitations  GI - Negative for vomiting, abdominal pain, diarrhea, constipation.  Positive for infrequent nausea  Musculoskeletal - Negative for back pain  Neurological - Negative for headaches  Psychiatric - Positive for depression     Physical Examination:  Vital signs: Ht 1.727 m (5' 8\")   Wt 99.8 kg (220 lb)   LMP  (LMP Unknown)   Breastfeeding No   BMI 33.45 kg/m²     Musculoskeletal: No abnormal movements.     Mental Status Evaluation:   General: Young white female, dressed in casual attire, good grooming and hygiene, in no apparent distress, calm and cooperative, good eye contact, no psychomotor agitation or retardation  Orientation: Alert and oriented to person, place and time  Recent and remote memory: Grossly intact  Attention span and concentration: Grossly intact  Speech: Spontaneous, normal rate, rhythm and tone  Thought Process: Linear, logical and goal directed  Thought Content: Denies suicidal or homicidal ideations, intent or " "plan  Perception: Denies auditory or visual hallucinations. No delusions noted  Associations: Intact  Language: Appropriate  Fund of knowledge and vocabulary: Grossly adequate  Mood: \"fine\"  Affect: Dysphoric at times, mood congruent  Insight: Good  Judgment: Good    Depression screening:  Depression Screen (PHQ-2/PHQ-9) 7/25/2017 9/30/2019 2/21/2020   PHQ-2 Total Score - 4 -   PHQ-2 Total Score - - -   PHQ-2 Total Score 2 - 2   PHQ-9 Total Score - 15 -   PHQ-9 Total Score - - -   PHQ-9 Total Score 20 - 10     Interpretation of PHQ-9 Total Score   Score Severity   1-4 No Depression   5-9 Mild Depression   10-14 Moderate Depression   15-19 Moderately Severe Depression   20-27 Severe Depression    Medical Records/Labs/Diagnostic Tests Reviewed:  [facility] records - appropriate refills, no abuse suspected       Impression:  1.  Major depressive disorder, recurrent, mild - stable   2.  Binge eating disorder, in partial remission - stable  3.  Alcohol use disorder, mild, in sustained remission (no binge drinking or daily heavy drinking since fall 2019) - stable  4.  History of bipolar mood disorder and psychotic symptoms    Plan:  1.  Continue Lexapro 10 mg daily for depression.  Discussed that she has been on the therapeutic dose of 10 mg for only 2 weeks and sometimes it can take 4 to 6 weeks to assess for efficacy and she is willing to continue her current dose for now.  2.  Continue Tegretol  mg twice daily for mood stabilization.  Advised her to take morning dose with breakfast to minimize nausea.  3.  Continue Vyvanse 70 mg in the morning for binge eating disorder.  E-prescribed x 30 days.  My medical assistant reached out to her pharmacy and was informed that no Vyvanse prescriptions were on hold at the pharmacy.  4.  Advised her to reach out to her insurance to see if she can see a therapist or not and her co-pay    Return to clinic in 6 weeks or sooner if symptoms worsen    The proposed treatment plan was " discussed with the patient who was provided the opportunity to ask questions and make suggestions regarding alternative treatment. Patient verbalized understanding and expressed agreement with the plan.     Sonja Alvarez M.D.  08/11/20    This note was created using voice recognition software (Dragon). The accuracy of the dictation is limited by the abilities of the software. I have reviewed the note prior to signing, however some errors in grammar and context are still possible. If you have any questions related to this note please do not hesitate to contact our office.

## 2020-09-15 DIAGNOSIS — F50.81 BINGE-EATING DISORDER, IN PARTIAL REMISSION, MILD: ICD-10-CM

## 2020-09-15 RX ORDER — LISDEXAMFETAMINE DIMESYLATE CAPSULES 70 MG/1
70 CAPSULE ORAL EVERY MORNING
Qty: 30 CAP | Refills: 0 | Status: SHIPPED | OUTPATIENT
Start: 2020-09-15 | End: 2020-09-24

## 2020-09-22 ENCOUNTER — APPOINTMENT (OUTPATIENT)
Dept: BEHAVIORAL HEALTH | Facility: CLINIC | Age: 30
End: 2020-09-22

## 2020-09-24 ENCOUNTER — TELEMEDICINE (OUTPATIENT)
Dept: BEHAVIORAL HEALTH | Facility: CLINIC | Age: 30
End: 2020-09-24
Payer: COMMERCIAL

## 2020-09-24 VITALS — BODY MASS INDEX: 33.34 KG/M2 | HEIGHT: 68 IN | WEIGHT: 220 LBS

## 2020-09-24 DIAGNOSIS — F33.0 MDD (MAJOR DEPRESSIVE DISORDER), RECURRENT EPISODE, MILD (HCC): ICD-10-CM

## 2020-09-24 DIAGNOSIS — F10.21 ALCOHOL USE DISORDER, MODERATE, IN SUSTAINED REMISSION (HCC): ICD-10-CM

## 2020-09-24 DIAGNOSIS — F50.81 BINGE-EATING DISORDER, IN PARTIAL REMISSION, MILD: ICD-10-CM

## 2020-09-24 PROCEDURE — 99214 OFFICE O/P EST MOD 30 MIN: CPT | Mod: 95,CR | Performed by: PSYCHIATRY & NEUROLOGY

## 2020-09-24 PROCEDURE — 90833 PSYTX W PT W E/M 30 MIN: CPT | Mod: 95,CR | Performed by: PSYCHIATRY & NEUROLOGY

## 2020-09-24 RX ORDER — ESCITALOPRAM OXALATE 10 MG/1
10 TABLET ORAL DAILY
Qty: 90 TAB | Refills: 0 | Status: SHIPPED | OUTPATIENT
Start: 2020-09-24 | End: 2020-11-24 | Stop reason: SDUPTHER

## 2020-09-24 RX ORDER — LISDEXAMFETAMINE DIMESYLATE CAPSULES 70 MG/1
70 CAPSULE ORAL EVERY MORNING
Qty: 30 CAP | Refills: 0 | Status: SHIPPED | OUTPATIENT
Start: 2020-11-12 | End: 2020-11-24 | Stop reason: SDUPTHER

## 2020-09-24 RX ORDER — LISDEXAMFETAMINE DIMESYLATE CAPSULES 70 MG/1
70 CAPSULE ORAL EVERY MORNING
Qty: 30 CAP | Refills: 0 | Status: SHIPPED | OUTPATIENT
Start: 2020-10-14 | End: 2020-11-13

## 2020-09-24 RX ORDER — CARBAMAZEPINE 200 MG/1
200 TABLET, EXTENDED RELEASE ORAL 2 TIMES DAILY
Qty: 180 TAB | Refills: 0 | Status: SHIPPED | OUTPATIENT
Start: 2020-09-24 | End: 2020-11-24 | Stop reason: SDUPTHER

## 2020-09-24 NOTE — PROGRESS NOTES
PSYCHIATRY VIRTUAL VISIT FOLLOW-UP NOTE      Chief Complaint   Patient presents with   • Follow-Up     depression, eating disorder       This evaluation was conducted via Zoom using secure and encrypted videoconferencing technology. The patient was in a private location in the [facility].    The patient's identity was confirmed and verbal consent was obtained for this virtual visit.    History Of Present Illness:  Ryanne Story is a 30 y.o. old female with major depressive disorder, binge eating disorder comes in today for follow up, was last seen seen 6 weeks ago.  She is doing all right in regards to her mental health since her last visit with me.  She has been compliant with all of her medications but has noticed that she is feeling the effects of the pandemic and the elections.  She is finding little distractions to make her happy which seem to be working.  She feels that even though she is on Lexapro she is not happy with them before but does not think that she is depressed as well.  She and her boyfriend are doing okay in regards to the relationship.  She recently took Chantix and has been able to quit smoking.  She went to like to hold with her friends and had 2 beers earlier this week and that was her only alcohol intake since July.  She has been doing all right in regards to her eating disorder and sleep.  She denies having thoughts of wanting to hurt herself or others.    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    Substance Use:  Alcohol - She had 2 beers earlier this week with friends and this was her first alcohol use since July 2020  Nicotine - Denies, quit nicotine use and stopped Chantix as well  Illicit drugs - Infrequent cannabis use     Past Medication Trials:  Lamictal (effective), Zoloft (s/e - suicidal thoughts, listed as drug alergy), Prozac (ineffective), Viibryd (s/e - irritability), Effexor  "(ineffective), Seroquel 25 mg for sleep (effective), Geodon, Ritalin (ineffective)    Medications:  Current Outpatient Medications   Medication Sig Dispense Refill   • lisdexamfetamine (VYVANSE) 70 MG capsule Take 1 Cap by mouth every morning for 30 days. 30 Cap 0   • CHANTIX CONTINUING MONTH JERICHO 1 MG tablet USE AS DIRECTED     • escitalopram (LEXAPRO) 10 MG Tab Take 1 Tab by mouth every day. 30 Tab 0   • fluticasone (FLONASE) 50 MCG/ACT nasal spray Spray 2 Sprays in nose every day. 1 Bottle 11   • carbamazepine SR (TEGRETOL XR) 200 MG TABLET SR 12 HR extended-release tablet Take 1 Tab by mouth 2 times a day. 60 Tab 3   • spironolactone (ALDACTONE) 100 MG Tab Take 1 Tab by mouth every day. 90 Tab 2     No current facility-administered medications for this visit.        Review Of Systems:    Constitutional - Positive for fatigue  Respiratory - Negative for shortness of breath, cough  CVS - Negative for chest pain, palpitations  GI - Negative for nausea, vomiting, abdominal pain, diarrhea, constipation  Musculoskeletal - Negative for back pain  Neurological - Negative for headaches  Psychiatric - Positive for depression     Physical Examination:  Vital signs: Ht 1.727 m (5' 8\")   Wt 99.8 kg (220 lb)   LMP  (LMP Unknown)   Breastfeeding No   BMI 33.45 kg/m²     Musculoskeletal: No abnormal movements.     Mental Status Evaluation:   General: Young white female, dressed in casual attire, good grooming and hygiene, in no apparent distress, calm and cooperative, good eye contact, no psychomotor agitation or retardation  Orientation: Alert and oriented to person, place and time  Recent and remote memory: Grossly intact  Attention span and concentration: Grossly intact  Speech: Spontaneous, normal rate, rhythm and tone  Thought Process: Linear, logical and goal directed  Thought Content: Denies suicidal or homicidal ideations, intent or plan  Perception: Denies auditory or visual hallucinations. No delusions " "noted  Associations: Intact  Language: Appropriate  Fund of knowledge and vocabulary: Grossly adequate  Mood: \"fine\"  Affect: Dysphoric at times, mood congruent  Insight: Good  Judgment: Good    Depression screening:  Depression Screen (PHQ-2/PHQ-9) 7/25/2017 9/30/2019 2/21/2020   PHQ-2 Total Score - 4 -   PHQ-2 Total Score - - -   PHQ-2 Total Score 2 - 2   PHQ-9 Total Score - 15 -   PHQ-9 Total Score - - -   PHQ-9 Total Score 20 - 10     Interpretation of PHQ-9 Total Score   Score Severity   1-4 No Depression   5-9 Mild Depression   10-14 Moderate Depression   15-19 Moderately Severe Depression   20-27 Severe Depression    Medical Records/Labs/Diagnostic Tests Reviewed:  [facility]  records - appropriate refills, no abuse suspected       Impression:  1.  Major depressive disorder, recurrent, mild - stable   2.  Binge eating disorder, in partial remission - stable  3.  Alcohol use disorder, mild, in sustained remission (no binge drinking or daily drinking since fall 2019) - stable  4.  History of bipolar mood disorder and psychotic symptoms    Plan:  1.  Continue Lexapro 10 mg daily for depression..  2.  Continue Tegretol  mg twice daily for mood stabilization.    3.  Continue Vyvanse 70 mg in the morning for binge eating disorder.  E-prescribed x 2 months  4.  Provided supportive psychotherapy (> 16 minutes).  Discussed how she has been feeling and why she is feeling this way including feeling stuck in the current situation and feeling helpless because of the pandemic and political scenario.  Advised her to look at working at a new hobby as a distraction or to try to learn something new.    Return to clinic in 2 months or sooner if symptoms worsen    The proposed treatment plan was discussed with the patient who was provided the opportunity to ask questions and make suggestions regarding alternative treatment. Patient verbalized understanding and expressed agreement with the plan.     Sonja Alvarez, " M.D.  09/24/20    This note was created using voice recognition software (Dragon). The accuracy of the dictation is limited by the abilities of the software. I have reviewed the note prior to signing, however some errors in grammar and context are still possible. If you have any questions related to this note please do not hesitate to contact our office.

## 2020-11-24 ENCOUNTER — TELEMEDICINE (OUTPATIENT)
Dept: BEHAVIORAL HEALTH | Facility: CLINIC | Age: 30
End: 2020-11-24
Payer: COMMERCIAL

## 2020-11-24 VITALS — BODY MASS INDEX: 34.86 KG/M2 | WEIGHT: 230 LBS | HEIGHT: 68 IN

## 2020-11-24 DIAGNOSIS — F10.10 ALCOHOL USE DISORDER, MILD, ABUSE: ICD-10-CM

## 2020-11-24 DIAGNOSIS — F33.0 MDD (MAJOR DEPRESSIVE DISORDER), RECURRENT EPISODE, MILD (HCC): ICD-10-CM

## 2020-11-24 DIAGNOSIS — F50.81 BINGE-EATING DISORDER, MILD: ICD-10-CM

## 2020-11-24 PROBLEM — F50.810 BINGE-EATING DISORDER, MILD: Status: ACTIVE | Noted: 2020-02-21

## 2020-11-24 PROCEDURE — 99214 OFFICE O/P EST MOD 30 MIN: CPT | Mod: 95,CR | Performed by: PSYCHIATRY & NEUROLOGY

## 2020-11-24 PROCEDURE — 90833 PSYTX W PT W E/M 30 MIN: CPT | Mod: 95,CR | Performed by: PSYCHIATRY & NEUROLOGY

## 2020-11-24 RX ORDER — CARBAMAZEPINE 200 MG/1
200 TABLET, EXTENDED RELEASE ORAL 2 TIMES DAILY
Qty: 180 TAB | Refills: 0 | Status: SHIPPED | OUTPATIENT
Start: 2020-11-24 | End: 2021-01-21 | Stop reason: SDUPTHER

## 2020-11-24 RX ORDER — LISDEXAMFETAMINE DIMESYLATE CAPSULES 70 MG/1
70 CAPSULE ORAL EVERY MORNING
Qty: 30 CAP | Refills: 0 | Status: SHIPPED | OUTPATIENT
Start: 2020-12-15 | End: 2021-01-14

## 2020-11-24 RX ORDER — ESCITALOPRAM OXALATE 10 MG/1
10 TABLET ORAL DAILY
Qty: 90 TAB | Refills: 0 | Status: SHIPPED | OUTPATIENT
Start: 2020-11-24 | End: 2021-01-21 | Stop reason: SDUPTHER

## 2020-11-24 NOTE — PROGRESS NOTES
PSYCHIATRY VIRTUAL VISIT FOLLOW-UP NOTE      Chief Complaint   Patient presents with   • Follow-Up     depression       This evaluation was conducted via Zoom using secure and encrypted videoconferencing technology. The patient was in a private location in the [facility].    The patient's identity was confirmed and verbal consent was obtained for this virtual visit.    History Of Present Illness:  Ryanne Story is a 30 y.o. female with major depressive disorder, binge eating disorder comes in today for follow up, was last seen 2 months ago.  She has been struggling with her mental health since her last visit with me.  Her grandmother  on 10/16/2020 and on 10/23/2020 she ended her 3-year long relationship.  She has had struggles in this relationship for a long time but things escalated to the point that she decided to end this relationship.  She started going to some casual dates and this last weekend had a binge drinking episode that lasted for a few days.  She does not even recall how much she drank but felt really bad after drinking heavily.  She contacted her ex-boyfriend during 1 of these binge drinking days but later regretted it.  She feels that she is close to this chapter but she is having a hard time moving on.  She has a hard time talking about her emotions as well.  She is feeling sad about hergrandmother's death as well even though they were not really close.  She has been using food as a coping mechanism as well.  She mentioned that last night she had a dinner and then ordered something from outside even though she was physically full.  She denies any compensatory ways after binge eating.  She denies having thoughts of wanting to hurt herself or others.    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    Substance Use:  Alcohol -   Nicotine -  Smokes  "daily  Cannabis - Infrequent cannabis use   Illicit drugs - Denies    Past Medication Trials:  Lamictal (effective), Zoloft (s/e - suicidal thoughts, listed as drug alergy), Prozac (ineffective), Viibryd (s/e - irritability), Effexor (ineffective), Seroquel 25 mg for sleep (effective), Geodon, Ritalin (ineffective)    Medications:  Current Outpatient Medications   Medication Sig Dispense Refill   • carbamazepine SR (TEGRETOL XR) 200 MG TABLET SR 12 HR extended-release tablet Take 1 Tab by mouth 2 times a day. 180 Tab 0   • escitalopram (LEXAPRO) 10 MG Tab Take 1 Tab by mouth every day. 90 Tab 0   • fluticasone (FLONASE) 50 MCG/ACT nasal spray Spray 2 Sprays in nose every day. 1 Bottle 11   • spironolactone (ALDACTONE) 100 MG Tab Take 1 Tab by mouth every day. 90 Tab 2     No current facility-administered medications for this visit.        Review Of Systems:    Constitutional - Positive for fatigue  Respiratory - Negative for shortness of breath, cough  CVS - Negative for chest pain, palpitations  GI - Negative for nausea, vomiting, abdominal pain, diarrhea, constipation  Musculoskeletal - Negative for back pain  Neurological - Negative for headaches  Psychiatric - Positive for depression     Physical Examination:  Vital signs: Ht 1.727 m (5' 8\")   Wt 104.3 kg (230 lb)   BMI 34.97 kg/m²     Musculoskeletal: No abnormal movements.     Mental Status Evaluation:   General: Young white female, dressed in casual attire, good grooming and hygiene, in no apparent distress, calm and cooperative, good eye contact, no psychomotor agitation or retardation  Orientation: Alert and oriented to person, place and time  Recent and remote memory: Grossly intact  Attention span and concentration: Grossly intact  Speech: Spontaneous, normal rate, rhythm and tone  Thought Process: Linear, logical and goal directed  Thought Content: Denies suicidal or homicidal ideations, intent or plan  Perception: Denies auditory or visual " "hallucinations. No delusions noted  Associations: Intact  Language: Appropriate  Fund of knowledge and vocabulary: Grossly adequate  Mood: \"fine\"  Affect: Dysphoric at times, mood congruent  Insight: Good  Judgment: Good    Depression screening:  Depression Screen (PHQ-2/PHQ-9) 7/25/2017 9/30/2019 2/21/2020   PHQ-2 Total Score - 4 -   PHQ-2 Total Score - - -   PHQ-2 Total Score 2 - 2   PHQ-9 Total Score - 15 -   PHQ-9 Total Score - - -   PHQ-9 Total Score 20 - 10     Interpretation of PHQ-9 Total Score   Score Severity   1-4 No Depression   5-9 Mild Depression   10-14 Moderate Depression   15-19 Moderately Severe Depression   20-27 Severe Depression    Medical Records/Labs/Diagnostic Tests Reviewed:  [place]  records - appropriate refills, no abuse suspected       Impression:  1.  Major depressive disorder, recurrent, mild - worsening due to recent break up  2.  Binge eating disorder, mild - worsening   3.  Alcohol use disorder, mild (last binge drinking episode ~ middle of Nov 2020) - worsening  4.  History of bipolar mood disorder and psychotic symptoms    Plan:  1.  Continue Lexapro 10 mg daily for depression.  2.  Continue Tegretol  mg twice daily for mood stabilization.    3.  Continue Vyvanse 70 mg in the morning for binge eating disorder.  E-prescribed x 4 weeks.  4.  Provided supportive psychotherapy (> 16 minutes) in regards to her grandmother's death and recent break up. Validated her emotions of grief related to her grandmother's death as well as loss of relationship.  Discussed healthy coping versus unhealthy coping with alcohol and food.  Encouraged her to stay in better touch with her friends and family and to talk about her feelings and process it in a healthy way.  5.  Advised not to drink heavily and to avoid using food as a coping mechanism  6.  Encouraged her to start psychotherapy given recent psychosocial stressors    Return to clinic in 4 weeks or sooner if symptoms worsen    The proposed " treatment plan was discussed with the patient who was provided the opportunity to ask questions and make suggestions regarding alternative treatment. Patient verbalized understanding and expressed agreement with the plan.     Sonja Alvarez M.D.  11/24/20    This note was created using voice recognition software (Dragon). The accuracy of the dictation is limited by the abilities of the software. I have reviewed the note prior to signing, however some errors in grammar and context are still possible. If you have any questions related to this note please do not hesitate to contact our office.

## 2020-12-18 ENCOUNTER — TELEMEDICINE (OUTPATIENT)
Dept: BEHAVIORAL HEALTH | Facility: CLINIC | Age: 30
End: 2020-12-18
Payer: COMMERCIAL

## 2020-12-18 VITALS — HEIGHT: 68 IN | BODY MASS INDEX: 34.97 KG/M2

## 2020-12-18 DIAGNOSIS — F10.10 ALCOHOL USE DISORDER, MILD, ABUSE: ICD-10-CM

## 2020-12-18 DIAGNOSIS — F33.0 MDD (MAJOR DEPRESSIVE DISORDER), RECURRENT EPISODE, MILD (HCC): ICD-10-CM

## 2020-12-18 DIAGNOSIS — F50.81 BINGE-EATING DISORDER, MILD: ICD-10-CM

## 2020-12-18 PROCEDURE — 99214 OFFICE O/P EST MOD 30 MIN: CPT | Mod: 95,CR | Performed by: PSYCHIATRY & NEUROLOGY

## 2020-12-18 PROCEDURE — 90833 PSYTX W PT W E/M 30 MIN: CPT | Mod: 95,CR | Performed by: PSYCHIATRY & NEUROLOGY

## 2020-12-18 NOTE — PROGRESS NOTES
PSYCHIATRY VIRTUAL VISIT FOLLOW-UP NOTE      Chief Complaint   Patient presents with   • Follow-Up     depression, eating disorder, alcohol use       This evaluation was conducted via Zoom using secure and encrypted videoconferencing technology. The patient was in a private location in the [facility].    The patient's identity was confirmed and verbal consent was obtained for this virtual visit.    History Of Present Illness:  Ryanne Story is a 30 y.o. female with major depressive disorder, binge eating disorder, alcohol use disorder comes in today for follow up, was last seen 4 weeks ago.  She continues to struggle with her mental health and has had a few binge drinking episodes since her last appointment with me.  She mentioned that last week she had a binge drinking episode on Friday and Monday and felt guilty after drinking heavy amounts of alcohol.  She ended up calling her ex-boyfriend after 1 of those been drinking episodes and she is not sure what she wants from this relationship.  She does feel different when she is sober.  She did start seeing a therapist through mental health But is not sure if therapy is helping him.  She has noticed that she has been sleeping a lot more than before.  She missed her Vyvanse for 2 days and noticed that it does actually help her eating.  She has not been busy at work and has lost some of her routine as well.  She denies having thoughts of wanting to hurt herself or others.    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    Substance Use:  Alcohol -she has had fewer binge drinking episode since her last visit with me.  Nicotine -  Smokes daily  Cannabis - Infrequent cannabis use   Illicit drugs - Denies    Past Medication Trials:  Lamictal (effective), Zoloft (s/e - suicidal thoughts, listed as drug alergy), Prozac  "(ineffective), Viibryd (s/e - irritability), Effexor (ineffective), Seroquel 25 mg for sleep (effective), Geodon, Ritalin (ineffective)    Medications:  Current Outpatient Medications   Medication Sig Dispense Refill   • lisdexamfetamine (VYVANSE) 70 MG capsule Take 1 Cap by mouth every morning for 30 days. 30 Cap 0   • carbamazepine SR (TEGRETOL XR) 200 MG TABLET SR 12 HR extended-release tablet Take 1 Tab by mouth 2 times a day. 180 Tab 0   • escitalopram (LEXAPRO) 10 MG Tab Take 1 Tab by mouth every day. 90 Tab 0   • fluticasone (FLONASE) 50 MCG/ACT nasal spray Spray 2 Sprays in nose every day. 1 Bottle 11   • spironolactone (ALDACTONE) 100 MG Tab Take 1 Tab by mouth every day. 90 Tab 2     No current facility-administered medications for this visit.        Review Of Systems:    Constitutional - Positive for fatigue  Respiratory - Negative for shortness of breath, cough  CVS - Negative for chest pain, palpitations  GI - Negative for nausea, vomiting, abdominal pain, diarrhea, constipation  Musculoskeletal - Negative for back pain  Neurological - Negative for headaches  Psychiatric - Positive for depression, alcohol use, excessive sleep    Physical Examination:  Vital signs: Ht 1.727 m (5' 8\")   BMI 34.97 kg/m²     Musculoskeletal: No abnormal movements.     Mental Status Evaluation:   General: Young white female, teary eyed, dressed in casual attire, good grooming and hygiene, in no apparent distress, calm and cooperative, good eye contact, no psychomotor agitation or retardation  Orientation: Alert and oriented to person, place and time  Recent and remote memory: Grossly intact  Attention span and concentration: Grossly intact  Speech: Spontaneous, normal rate, rhythm and tone  Thought Process: Linear, logical and goal directed  Thought Content: Denies suicidal or homicidal ideations, intent or plan  Perception: Denies auditory or visual hallucinations. No delusions noted  Associations: Intact  Language: " "Appropriate  Fund of knowledge and vocabulary: Grossly adequate  Mood: \"not so bad\"  Affect: Dysphoric at times, mood congruent  Insight: Good  Judgment: Good    Depression screening:  Depression Screen (PHQ-2/PHQ-9) 7/25/2017 9/30/2019 2/21/2020   PHQ-2 Total Score - 4 -   PHQ-2 Total Score - - -   PHQ-2 Total Score 2 - 2   PHQ-9 Total Score - 15 -   PHQ-9 Total Score - - -   PHQ-9 Total Score 20 - 10     Interpretation of PHQ-9 Total Score   Score Severity   1-4 No Depression   5-9 Mild Depression   10-14 Moderate Depression   15-19 Moderately Severe Depression   20-27 Severe Depression    Medical Records/Labs/Diagnostic Tests Reviewed:  [place]  records - appropriate refills, no abuse suspected       Impression:  1.  Major depressive disorder, recurrent, mild - stable  2.  Binge eating disorder, mild - stable  3.  Alcohol use disorder, mild (last binge drinking episode 12/21/2020) - stable   4.  History of bipolar mood disorder and psychotic symptoms    Plan:  1.  Continue Lexapro 10 mg daily for depression.  2.  Continue Tegretol  mg twice daily for mood stabilization.    3.  Continue Vyvanse 70 mg in the morning for binge eating disorder.  Not refilled today.  I reminded her off the our clinic policy of not prescribing stimulant medication if concurrent addiction is going on.  If she continues to have binge drinking episodes at her next appointment will discontinue Vyvanse.  4.  Advised to avoid alcohol use and to work on handing emotions without alcohol  5.  Continue individual psychotherapy with Keesha Fernando through [facility]  6.  Provided supportive psychotherapy (> 16 minutes) in regards to her breakup and recent struggles with depression and alcohol use.  Discussed how she is using alcohol to avoid thinking/dealing with her break up and confusion in her own head and heart.  Encouraged to focus on sobriety until her next appointment and deciding what she wants.    Return to clinic in 4 weeks or " sooner if symptoms worsen    The proposed treatment plan was discussed with the patient who was provided the opportunity to ask questions and make suggestions regarding alternative treatment. Patient verbalized understanding and expressed agreement with the plan.     Sonja Alvarez M.D.  12/18/20    This note was created using voice recognition software (Dragon). The accuracy of the dictation is limited by the abilities of the software. I have reviewed the note prior to signing, however some errors in grammar and context are still possible. If you have any questions related to this note please do not hesitate to contact our office.

## 2021-01-21 ENCOUNTER — TELEMEDICINE (OUTPATIENT)
Dept: BEHAVIORAL HEALTH | Facility: CLINIC | Age: 31
End: 2021-01-21
Payer: COMMERCIAL

## 2021-01-21 VITALS — WEIGHT: 230 LBS | HEIGHT: 68 IN | BODY MASS INDEX: 34.86 KG/M2

## 2021-01-21 DIAGNOSIS — F10.10 ALCOHOL USE DISORDER, MILD, ABUSE: ICD-10-CM

## 2021-01-21 DIAGNOSIS — F50.81 BINGE-EATING DISORDER, MILD: ICD-10-CM

## 2021-01-21 DIAGNOSIS — F33.0 MDD (MAJOR DEPRESSIVE DISORDER), RECURRENT EPISODE, MILD (HCC): ICD-10-CM

## 2021-01-21 PROCEDURE — 90833 PSYTX W PT W E/M 30 MIN: CPT | Mod: 95,CR | Performed by: PSYCHIATRY & NEUROLOGY

## 2021-01-21 PROCEDURE — 99214 OFFICE O/P EST MOD 30 MIN: CPT | Mod: 95,CR | Performed by: PSYCHIATRY & NEUROLOGY

## 2021-01-21 RX ORDER — ESCITALOPRAM OXALATE 10 MG/1
10 TABLET ORAL DAILY
Qty: 90 TAB | Refills: 0 | Status: SHIPPED | OUTPATIENT
Start: 2021-01-21 | End: 2021-03-16 | Stop reason: SDUPTHER

## 2021-01-21 RX ORDER — LISDEXAMFETAMINE DIMESYLATE CAPSULES 70 MG/1
70 CAPSULE ORAL EVERY MORNING
Qty: 30 CAP | Refills: 0 | Status: SHIPPED | OUTPATIENT
Start: 2021-01-21 | End: 2021-02-20

## 2021-01-21 RX ORDER — LISDEXAMFETAMINE DIMESYLATE CAPSULES 70 MG/1
70 CAPSULE ORAL EVERY MORNING
Qty: 30 CAP | Refills: 0 | Status: SHIPPED | OUTPATIENT
Start: 2021-02-19 | End: 2021-03-21

## 2021-01-21 RX ORDER — CARBAMAZEPINE 200 MG/1
200 TABLET, EXTENDED RELEASE ORAL 2 TIMES DAILY
Qty: 180 TAB | Refills: 0 | Status: SHIPPED | OUTPATIENT
Start: 2021-01-21 | End: 2021-03-16 | Stop reason: SDUPTHER

## 2021-01-21 ASSESSMENT — PATIENT HEALTH QUESTIONNAIRE - PHQ9
5. POOR APPETITE OR OVEREATING: 3 - NEARLY EVERY DAY
CLINICAL INTERPRETATION OF PHQ2 SCORE: 2
SUM OF ALL RESPONSES TO PHQ QUESTIONS 1-9: 15

## 2021-01-21 NOTE — PROGRESS NOTES
PSYCHIATRY VIRTUAL VISIT FOLLOW-UP NOTE      Chief Complaint   Patient presents with   • Follow-Up     depression       This evaluation was conducted via Zoom using secure and encrypted videoconferencing technology. The patient was in a private location in the [facility].    The patient's identity was confirmed and verbal consent was obtained for this virtual visit.    History Of Present Illness:  Ryanne Story is a 30 y.o. female with major depressive disorder, binge eating disorder, alcohol use disorder comes in today for follow up, was last seen 4 weeks ago.  She has been doing better in regards to her depression since her last appointment with me.  She has been working on a project for work and that has been really helpful.  She states that for her work she tends to get busy in spurts and for the month of December and November she was free.  She is also been talking to her boyfriend regularly and they have been staying together but she is not sure about the future of this relationship.  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  She has been compliant with her medications.  She is endorsing fatigue but otherwise is doing all right.  She has had a few drinks during episodes but overall feels better since her last appointment with me.  She has not had any alcohol in over a month now and is feeling really good about it.  She denies having thoughts of wanting to hurt herself or others.    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    Substance Use:  Alcohol - She denies any alcohol use since her last appointment with me  Nicotine -  Smokes daily  Cannabis - Infrequent cannabis use   Illicit drugs - Denies    Past Medication Trials:  Lamictal (effective), Zoloft (drug  "allergy), Prozac (ineffective), Viibryd (s/e - irritability), Effexor (ineffective), Seroquel 25 mg for sleep (effective), Geodon, Ritalin (ineffective)    Medications:  Current Outpatient Medications   Medication Sig Dispense Refill   • carbamazepine SR (TEGRETOL XR) 200 MG TABLET SR 12 HR extended-release tablet Take 1 Tab by mouth 2 times a day. 180 Tab 0   • escitalopram (LEXAPRO) 10 MG Tab Take 1 Tab by mouth every day. 90 Tab 0   • fluticasone (FLONASE) 50 MCG/ACT nasal spray Spray 2 Sprays in nose every day. 1 Bottle 11   • spironolactone (ALDACTONE) 100 MG Tab Take 1 Tab by mouth every day. 90 Tab 2     No current facility-administered medications for this visit.        Review Of Systems:    Constitutional - Positive for fatigue  Psychiatric - Positive for depression    Physical Examination:  Vital signs: Ht 1.727 m (5' 8\")   Wt 104.3 kg (230 lb)   LMP  (LMP Unknown)   Breastfeeding No   BMI 34.97 kg/m²     Musculoskeletal: No abnormal movements.     Mental Status Evaluation:   General: Young white female, dressed in casual attire, good grooming and hygiene, in no apparent distress, calm and cooperative, good eye contact, no psychomotor agitation or retardation  Orientation: Alert and oriented to person, place and time  Recent and remote memory: Grossly intact  Attention span and concentration: Grossly intact  Speech: Spontaneous, normal rate, rhythm and tone  Thought Process: Linear, logical and goal directed  Thought Content: Denies suicidal or homicidal ideations, intent or plan  Perception: Denies auditory or visual hallucinations. No delusions noted  Associations: Intact  Language: Appropriate  Fund of knowledge and vocabulary: Grossly adequate  Mood: \"not so bad\"  Affect: Euthymic, mood congruent  Insight: Good  Judgment: Good    Depression screening:  Depression Screen (PHQ-2/PHQ-9) 9/30/2019 2/21/2020 1/21/2021   PHQ-2 Total Score 4 - -   PHQ-2 Total Score - - -   PHQ-2 Total Score - 2 2   PHQ-9 " Total Score 15 - -   PHQ-9 Total Score - - -   PHQ-9 Total Score - 10 15     Interpretation of PHQ-9 Total Score   Score Severity   1-4 No Depression   5-9 Mild Depression   10-14 Moderate Depression   15-19 Moderately Severe Depression   20-27 Severe Depression    Medical Records/Labs/Diagnostic Tests Reviewed:  [facility]  records - appropriate refills, no abuse suspected       Impression:  1.  Major depressive disorder, recurrent, mild  2.  Binge eating disorder, mild  3.  Alcohol use disorder, mild (last binge drinking episode ~ mid Dec 2020)  4.  History of bipolar mood disorder and psychotic symptoms    Plan:  1.  Continue Lexapro 10 mg daily for depression.  2.  Continue Tegretol  mg twice daily for mood stabilization.    3.  Continue Vyvanse 70 mg in the morning for binge eating disorder.  E-prescribed x 2 months.  4.  I have advised her to continue avoiding any alcohol use  5.  Continue individual psychotherapy with Keeshagurjit King through [facility]  6.  Provided supportive psychotherapy (> 16 minutes) in regards to her relationship as well as how she handles her finances.  Encouraged her to first decide what she wants in the relationship and then have a thorough discussion with her boyfriend and make sure that she maintains a relationship boundaries that she is setting.  Discussed how she is in a cycle over and over again with this relationship and how it ends up hurting her every now and then.  Discussed her financial decision making and encouraged to have some goals for herself like setting up a certain amount for her savings at the start of the month and limiting food delivery to a minimum.    Return to clinic in 2 months or sooner if symptoms worsen    The proposed treatment plan was discussed with the patient who was provided the opportunity to ask questions and make suggestions regarding alternative treatment. Patient verbalized understanding and expressed agreement with the plan.     Sonja EVANS  VINOD Alvarez  01/21/21    This note was created using voice recognition software (Dragon). The accuracy of the dictation is limited by the abilities of the software. I have reviewed the note prior to signing, however some errors in grammar and context are still possible. If you have any questions related to this note please do not hesitate to contact our office.

## 2021-02-02 ENCOUNTER — TELEPHONE (OUTPATIENT)
Dept: MEDICAL GROUP | Facility: MEDICAL CENTER | Age: 31
End: 2021-02-02

## 2021-02-02 NOTE — TELEPHONE ENCOUNTER
Phone Number Called: [phone]    Call outcome: Spoke to patient regarding message below.    Message: Called to follow up with patient about VM left requesting appointment with new provider. Patient scheduled for appointment with Dr Montano on 02/16/2021

## 2021-02-16 ENCOUNTER — OFFICE VISIT (OUTPATIENT)
Dept: MEDICAL GROUP | Facility: MEDICAL CENTER | Age: 31
End: 2021-02-16
Payer: COMMERCIAL

## 2021-02-16 VITALS
DIASTOLIC BLOOD PRESSURE: 72 MMHG | BODY MASS INDEX: 36.68 KG/M2 | WEIGHT: 242 LBS | OXYGEN SATURATION: 98 % | TEMPERATURE: 97.6 F | SYSTOLIC BLOOD PRESSURE: 124 MMHG | HEIGHT: 68 IN | HEART RATE: 100 BPM | RESPIRATION RATE: 16 BRPM

## 2021-02-16 DIAGNOSIS — L30.9 ECZEMA, UNSPECIFIED TYPE: ICD-10-CM

## 2021-02-16 DIAGNOSIS — E78.5 HYPERLIPIDEMIA, UNSPECIFIED HYPERLIPIDEMIA TYPE: ICD-10-CM

## 2021-02-16 DIAGNOSIS — Z23 NEED FOR VACCINATION: ICD-10-CM

## 2021-02-16 DIAGNOSIS — R68.89 COLD INTOLERANCE: ICD-10-CM

## 2021-02-16 DIAGNOSIS — N92.6 IRREGULAR PERIODS: ICD-10-CM

## 2021-02-16 DIAGNOSIS — F17.200 NICOTINE USE DISORDER: ICD-10-CM

## 2021-02-16 DIAGNOSIS — E66.9 OBESITY (BMI 35.0-39.9 WITHOUT COMORBIDITY): ICD-10-CM

## 2021-02-16 DIAGNOSIS — L73.2 SUPPURATIVE HIDRADENITIS: ICD-10-CM

## 2021-02-16 DIAGNOSIS — Z82.49 FAMILY HISTORY OF HEART DISEASE: ICD-10-CM

## 2021-02-16 DIAGNOSIS — Z97.5 PRESENCE OF INTRAUTERINE CONTRACEPTIVE DEVICE: ICD-10-CM

## 2021-02-16 PROBLEM — R41.840 DISTURBED CONCENTRATION: Status: RESOLVED | Noted: 2020-02-21 | Resolved: 2021-02-16

## 2021-02-16 PROCEDURE — 90732 PPSV23 VACC 2 YRS+ SUBQ/IM: CPT | Performed by: FAMILY MEDICINE

## 2021-02-16 PROCEDURE — 90686 IIV4 VACC NO PRSV 0.5 ML IM: CPT | Performed by: FAMILY MEDICINE

## 2021-02-16 PROCEDURE — 90471 IMMUNIZATION ADMIN: CPT | Performed by: FAMILY MEDICINE

## 2021-02-16 PROCEDURE — 99214 OFFICE O/P EST MOD 30 MIN: CPT | Mod: 25 | Performed by: FAMILY MEDICINE

## 2021-02-16 PROCEDURE — 90472 IMMUNIZATION ADMIN EACH ADD: CPT | Performed by: FAMILY MEDICINE

## 2021-02-16 RX ORDER — TRIAMCINOLONE ACETONIDE 1 MG/G
CREAM TOPICAL
Qty: 45 G | Refills: 1 | Status: SHIPPED | OUTPATIENT
Start: 2021-02-16 | End: 2023-02-18

## 2021-02-16 RX ORDER — SPIRONOLACTONE 100 MG/1
100 TABLET, FILM COATED ORAL DAILY
Qty: 90 TABLET | Refills: 0 | Status: SHIPPED | OUTPATIENT
Start: 2021-02-16 | End: 2021-08-07 | Stop reason: SDUPTHER

## 2021-02-16 NOTE — PROGRESS NOTES
cc:  Suppurative hidradenitis    Subjective:     Ryanne Story is a 31 y.o. female presenting to establish care:    1.  Suppurative hidradenitis: She reports a history of skin abscesses for years, mostly in the groin, breast, axillary areas.  She states that since being on the spironolactone 100 mg daily, she has had no more flareups.  She does have irregular periods, but these have improved after getting an IUD placed.  She also notices dry skin, cold intolerance, would like to have her thyroid checked.    2.  Hyperlipidemia: Has a history of elevated cholesterol, is due for labs.  She does report a family history of early heart disease, her father had a heart attack in his mid 30s.  She has been trying to eat healthier, also trying to lose weight.      Review of systems:  See above.  Also having some numbness in her right third, fourth, fifth digit with some occasional wrist pain, symptoms are improving with the wrist brace      Current Outpatient Medications:   •  spironolactone (ALDACTONE) 100 MG Tab, Take 1 tablet by mouth every day., Disp: 90 tablet, Rfl: 0  •  triamcinolone acetonide (KENALOG) 0.1 % Cream, Apply thin layer to affected area twice a day as needed for rash, do not use longer than 14 days at a time., Disp: 45 g, Rfl: 1  •  multivitamin (THERAGRAN) Tab, Take 1 tablet by mouth every day., Disp: , Rfl:   •  [START ON 2/19/2021] lisdexamfetamine (VYVANSE) 70 MG capsule, Take 1 Cap by mouth every morning for 30 days., Disp: 30 Cap, Rfl: 0  •  lisdexamfetamine (VYVANSE) 70 MG capsule, Take 1 Cap by mouth every morning for 30 days., Disp: 30 Cap, Rfl: 0  •  escitalopram (LEXAPRO) 10 MG Tab, Take 1 Tab by mouth every day., Disp: 90 Tab, Rfl: 0  •  carbamazepine SR (TEGRETOL XR) 200 MG TABLET SR 12 HR extended-release tablet, Take 1 Tab by mouth 2 times a day., Disp: 180 Tab, Rfl: 0  •  fluticasone (FLONASE) 50 MCG/ACT nasal spray, Spray 2 Sprays in nose every day., Disp: 1 Bottle, Rfl: 11    Allergies,  "past medical history, past surgical history, family history, social history reviewed and updated    Objective:     Vitals: /72   Pulse 100   Temp 36.4 °C (97.6 °F)   Resp 16   Ht 1.727 m (5' 8\")   Wt 110 kg (242 lb)   LMP  (LMP Unknown)   SpO2 98%   BMI 36.80 kg/m²   General: Alert, pleasant, NAD  HEENT: Normocephalic.    Heart: Regular rate and rhythm.  S1 and S2 normal.  No murmurs appreciated.  Respiratory: Normal respiratory effort.  Clear to auscultation bilaterally.  Abdomen: Non-distended, soft  Skin: Warm, dry, no rashes.  Musculoskeletal: Gait is normal.  Moves all extremities well.  Extremities: No leg edema.  Psych:  Affect/mood is normal, judgement is good, memory is intact, grooming is appropriate.    Assessment/Plan:     Ryanne was seen today for establish care.    Diagnoses and all orders for this visit:    Suppurative hidradenitis  Chronic, stable.  She may have PCOS,, but will continue with the spironolactone.  Is due for labs.  -     spironolactone (ALDACTONE) 100 MG Tab; Take 1 tablet by mouth every day.  -     CBC WITHOUT DIFFERENTIAL; Future  -     Comp Metabolic Panel; Future    Irregular periods  Cold intolerance  Will check the following  -     CBC WITHOUT DIFFERENTIAL; Future  -     Comp Metabolic Panel; Future  -     TSH WITH REFLEX TO FT4; Future    Eczema, unspecified type  Self-limited issue, will try some triamcinolone cream  -     triamcinolone acetonide (KENALOG) 0.1 % Cream; Apply thin layer to affected area twice a day as needed for rash, do not use longer than 14 days at a time.    Hyperlipidemia, unspecified hyperlipidemia type  Family history of heart disease  Chronic, possibly stable.  We will recheck.  Will consider starting a statin.  She does have an IUD.  -     Comp Metabolic Panel; Future  -     Lipid Profile; Future    IUD (intrauterine device) in place  Managed by GYN    Nicotine use disorder  Advised to quit, patient is working on this  -     Pneumococal " Polysaccharide Vaccine 23-Valent =>1YO SQ/IM    Obesity (BMI 35.0-39.9 without comorbidity)  -     Patient identified as having weight management issue.  Appropriate orders and counseling given.    Need for vaccination  -     Influenza Vaccine Quad Injection (PF)  -     Pneumococal Polysaccharide Vaccine 23-Valent =>1YO SQ/IM          Return in about 6 months (around 8/16/2021) for routine follow up.

## 2021-03-16 ENCOUNTER — TELEMEDICINE (OUTPATIENT)
Dept: BEHAVIORAL HEALTH | Facility: CLINIC | Age: 31
End: 2021-03-16
Payer: COMMERCIAL

## 2021-03-16 VITALS — WEIGHT: 242 LBS | BODY MASS INDEX: 36.68 KG/M2 | HEIGHT: 68 IN

## 2021-03-16 DIAGNOSIS — F33.41 MDD (MAJOR DEPRESSIVE DISORDER), RECURRENT, IN PARTIAL REMISSION (HCC): ICD-10-CM

## 2021-03-16 DIAGNOSIS — F10.10 ALCOHOL USE DISORDER, MILD, ABUSE: ICD-10-CM

## 2021-03-16 DIAGNOSIS — F50.81 BINGE-EATING DISORDER, MILD: ICD-10-CM

## 2021-03-16 PROCEDURE — 90833 PSYTX W PT W E/M 30 MIN: CPT | Mod: 95,CR | Performed by: PSYCHIATRY & NEUROLOGY

## 2021-03-16 PROCEDURE — 99214 OFFICE O/P EST MOD 30 MIN: CPT | Mod: 95,CR | Performed by: PSYCHIATRY & NEUROLOGY

## 2021-03-16 RX ORDER — LISDEXAMFETAMINE DIMESYLATE CAPSULES 70 MG/1
70 CAPSULE ORAL EVERY MORNING
Qty: 30 CAPSULE | Refills: 0 | Status: SHIPPED | OUTPATIENT
Start: 2021-03-22 | End: 2021-04-21

## 2021-03-16 RX ORDER — ESCITALOPRAM OXALATE 10 MG/1
10 TABLET ORAL DAILY
Qty: 90 TABLET | Refills: 0 | Status: SHIPPED | OUTPATIENT
Start: 2021-03-16 | End: 2021-06-07 | Stop reason: SDUPTHER

## 2021-03-16 RX ORDER — LISDEXAMFETAMINE DIMESYLATE CAPSULES 70 MG/1
70 CAPSULE ORAL EVERY MORNING
Qty: 30 CAPSULE | Refills: 0 | Status: SHIPPED | OUTPATIENT
Start: 2021-04-20 | End: 2021-05-20

## 2021-03-16 RX ORDER — LISDEXAMFETAMINE DIMESYLATE CAPSULES 70 MG/1
70 CAPSULE ORAL EVERY MORNING
Qty: 30 CAPSULE | Refills: 0 | Status: SHIPPED | OUTPATIENT
Start: 2021-05-19 | End: 2021-06-18

## 2021-03-16 RX ORDER — CARBAMAZEPINE 200 MG/1
200 TABLET, EXTENDED RELEASE ORAL 2 TIMES DAILY
Qty: 180 TABLET | Refills: 0 | Status: SHIPPED | OUTPATIENT
Start: 2021-03-16 | End: 2021-06-07 | Stop reason: SDUPTHER

## 2021-03-16 NOTE — PROGRESS NOTES
PSYCHIATRY VIRTUAL VISIT FOLLOW-UP NOTE      Chief Complaint   Patient presents with   • Follow-Up     mood, eating disorder       This evaluation was conducted via Zoom using secure and encrypted videoconferencing technology. The patient was in a private location in the [facility].    The patient's identity was confirmed and verbal consent was obtained for this virtual visit.    History Of Present Illness:  Ryanne Story is a 30 y.o. female with major depressive disorder, binge eating disorder, alcohol use disorder, suppurative hidradenitis comes in today for follow up, was last seen 2 months ago. She has been doing better in regards to her depression and she has been able to keep herself busy. She is helping her friend to recently but a new home pain to her house that has been working really well for her. She is back with her boyfriend and they are still having some relationship stressors. She had told him that he would need to get a job and being therapy for them to be together but he has done neither of that. She has noticed that when she gets back home her binge eating has been getting worse. She is also been smoking more marijuana than before. She however denies any alcohol use. She is doing all right in regards to her medications. She denies having thoughts wanting to hurt herself or others.    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    Substance Use:  Alcohol - Denies alcohol use since her last appointment with me  Nicotine - Smokes daily  Cannabis - Daily cannabis use   Illicit drugs - Denies    Past Medication Trials:  Lamictal (effective), Zoloft (drug allergy), Prozac (ineffective), Viibryd (s/e - irritability), Effexor (ineffective), Seroquel 25 mg for sleep (effective), Geodon, Ritalin  "(ineffective)    Medications:  Current Outpatient Medications   Medication Sig Dispense Refill   • spironolactone (ALDACTONE) 100 MG Tab Take 1 tablet by mouth every day. 90 tablet 0   • triamcinolone acetonide (KENALOG) 0.1 % Cream Apply thin layer to affected area twice a day as needed for rash, do not use longer than 14 days at a time. 45 g 1   • multivitamin (THERAGRAN) Tab Take 1 tablet by mouth every day.     • lisdexamfetamine (VYVANSE) 70 MG capsule Take 1 Cap by mouth every morning for 30 days. 30 Cap 0   • escitalopram (LEXAPRO) 10 MG Tab Take 1 Tab by mouth every day. 90 Tab 0   • carbamazepine SR (TEGRETOL XR) 200 MG TABLET SR 12 HR extended-release tablet Take 1 Tab by mouth 2 times a day. 180 Tab 0   • fluticasone (FLONASE) 50 MCG/ACT nasal spray Spray 2 Sprays in nose every day. 1 Bottle 11     No current facility-administered medications for this visit.       Review Of Systems:    Constitutional - Positive for fatigue  Psychiatric - Positive for depression    Physical Examination:  Vital signs: Ht 1.727 m (5' 8\")   Wt 110 kg (242 lb)   LMP  (LMP Unknown)   Breastfeeding No   BMI 36.80 kg/m²     Musculoskeletal: No abnormal movements.     Mental Status Evaluation:   General: Young white female, dressed in casual attire, good grooming and hygiene, in no apparent distress, calm and cooperative, good eye contact, no psychomotor agitation or retardation  Orientation: Alert and oriented to person, place and time  Recent and remote memory: Grossly intact  Attention span and concentration: Grossly intact  Speech: Spontaneous, normal rate, rhythm and tone  Thought Process: Linear, logical and goal directed  Thought Content: Denies suicidal or homicidal ideations, intent or plan  Perception: Denies auditory or visual hallucinations. No delusions noted  Associations: Intact  Language: Appropriate  Fund of knowledge and vocabulary: Grossly adequate  Mood: \"not so bad lately\"  Affect: Euthymic, mood " congruent  Insight: Good  Judgment: Good    Depression screening:  Depression Screen (PHQ-2/PHQ-9) 9/30/2019 2/21/2020 1/21/2021   PHQ-2 Total Score 4 - -   PHQ-2 Total Score - - -   PHQ-2 Total Score - 2 2   PHQ-9 Total Score 15 - -   PHQ-9 Total Score - - -   PHQ-9 Total Score - 10 15     Interpretation of PHQ-9 Total Score   Score Severity   1-4 No Depression   5-9 Mild Depression   10-14 Moderate Depression   15-19 Moderately Severe Depression   20-27 Severe Depression    Medical Records/Labs/Diagnostic Tests Reviewed:  [place] [facility] records - appropriate refills, no abuse suspected       Impression:  1.  Major depressive disorder, recurrent, in partial remission - improving  2.  Binge eating disorder, mild - slight worsening  3.  Alcohol use disorder, mild (last binge drinking episode ~ mid Dec 2020) - stable  4.  History of bipolar mood disorder and psychotic symptoms    Plan:  1.  Continue Lexapro 10 mg daily for depression.  2.  Continue Tegretol  mg twice daily for mood stabilization.    3.  Continue Vyvanse 70 mg in the morning for binge eating disorder.  E-prescribed x 3 months.  4.  I have advised her to continue avoiding any alcohol use  5.  Continue individual psychotherapy with Keesha King through [email or web address]  6.  Provided supportive psychotherapy (> 16 minutes) in regards to her depression and relationship. Discussed how her relationship is affecting her binge eating as when she comes back home she overeats to help with her emotions. Encouraged to continue working on her boundary issues and make sure that she is following up with them. Encouraged self care and advised to stay busy.    Return to clinic in 3 months or sooner if symptoms worsen    The proposed treatment plan was discussed with the patient who was provided the opportunity to ask questions and make suggestions regarding alternative treatment. Patient verbalized understanding and expressed agreement with the plan.     Sonja Alvarez,  M.D.  03/16/21    This note was created using voice recognition software (Dragon). The accuracy of the dictation is limited by the abilities of the software. I have reviewed the note prior to signing, however some errors in grammar and context are still possible. If you have any questions related to this note please do not hesitate to contact our office.

## 2021-05-04 ENCOUNTER — PATIENT MESSAGE (OUTPATIENT)
Dept: MEDICAL GROUP | Facility: MEDICAL CENTER | Age: 31
End: 2021-05-04

## 2021-05-04 DIAGNOSIS — M54.50 ACUTE BILATERAL LOW BACK PAIN WITHOUT SCIATICA: ICD-10-CM

## 2021-05-11 RX ORDER — TIZANIDINE 4 MG/1
4 TABLET ORAL EVERY 6 HOURS PRN
Qty: 90 TABLET | Refills: 0 | Status: SHIPPED | OUTPATIENT
Start: 2021-05-11

## 2021-06-07 ENCOUNTER — TELEMEDICINE (OUTPATIENT)
Dept: BEHAVIORAL HEALTH | Facility: CLINIC | Age: 31
End: 2021-06-07
Payer: COMMERCIAL

## 2021-06-07 VITALS — BODY MASS INDEX: 36.8 KG/M2 | HEIGHT: 68 IN

## 2021-06-07 DIAGNOSIS — F17.200 NICOTINE USE DISORDER: ICD-10-CM

## 2021-06-07 DIAGNOSIS — F50.81 BINGE-EATING DISORDER, MILD: ICD-10-CM

## 2021-06-07 DIAGNOSIS — F10.10 ALCOHOL USE DISORDER, MILD, ABUSE: ICD-10-CM

## 2021-06-07 DIAGNOSIS — F33.41 MDD (MAJOR DEPRESSIVE DISORDER), RECURRENT, IN PARTIAL REMISSION (HCC): ICD-10-CM

## 2021-06-07 PROCEDURE — 90833 PSYTX W PT W E/M 30 MIN: CPT | Mod: 95 | Performed by: PSYCHIATRY & NEUROLOGY

## 2021-06-07 PROCEDURE — 99214 OFFICE O/P EST MOD 30 MIN: CPT | Mod: 95 | Performed by: PSYCHIATRY & NEUROLOGY

## 2021-06-07 RX ORDER — LISDEXAMFETAMINE DIMESYLATE CAPSULES 70 MG/1
70 CAPSULE ORAL EVERY MORNING
Qty: 30 CAPSULE | Refills: 0 | Status: SHIPPED | OUTPATIENT
Start: 2021-08-26 | End: 2021-09-25

## 2021-06-07 RX ORDER — VARENICLINE TARTRATE 1 MG/1
TABLET, FILM COATED ORAL
Qty: 52 TABLET | Refills: 2 | Status: SHIPPED | OUTPATIENT
Start: 2021-06-07 | End: 2021-07-07

## 2021-06-07 RX ORDER — CARBAMAZEPINE 200 MG/1
200 TABLET, EXTENDED RELEASE ORAL 2 TIMES DAILY
Qty: 180 TABLET | Refills: 0 | Status: SHIPPED | OUTPATIENT
Start: 2021-06-07 | End: 2021-09-07 | Stop reason: SDUPTHER

## 2021-06-07 RX ORDER — LISDEXAMFETAMINE DIMESYLATE CAPSULES 70 MG/1
70 CAPSULE ORAL EVERY MORNING
Qty: 30 CAPSULE | Refills: 0 | Status: SHIPPED | OUTPATIENT
Start: 2021-06-29 | End: 2021-07-29

## 2021-06-07 RX ORDER — LISDEXAMFETAMINE DIMESYLATE CAPSULES 70 MG/1
70 CAPSULE ORAL EVERY MORNING
Qty: 30 CAPSULE | Refills: 0 | Status: SHIPPED | OUTPATIENT
Start: 2021-07-28 | End: 2021-08-27

## 2021-06-07 RX ORDER — ESCITALOPRAM OXALATE 10 MG/1
10 TABLET ORAL DAILY
Qty: 90 TABLET | Refills: 0 | Status: SHIPPED | OUTPATIENT
Start: 2021-06-07 | End: 2021-09-07 | Stop reason: SDUPTHER

## 2021-06-07 NOTE — PROGRESS NOTES
PSYCHIATRY VIRTUAL VISIT FOLLOW-UP NOTE      Chief Complaint   Patient presents with   • Follow-Up     depression, binge eating, alcohol use       This evaluation was conducted via Zoom using secure and encrypted videoconferencing technology. The patient was in a private location in the [facility].    The patient's identity was confirmed and verbal consent was obtained for this virtual visit.    History Of Present Illness:  Ryanne Story is a 31 y.o. female with major depressive disorder, binge eating disorder, alcohol use disorder, suppurative hidradenitis comes in today for follow up, was last seen 3 months ago.  She has been doing relatively okay since her last appointment with me.  She recently ended her relationship and is doing all right.  She denies using alcohol to cope with the break-up.  She has been talking to a friend and is realizing that this relationship is not healthy for her.  She also injured her back and is slowly recovering.  She has been compliant with her medications and feels that they have been helping her.  She had some struggles with her eating but is now back home and is doing better.  She denies having thoughts of wanting to hurt herself.    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    Substance Use:  Alcohol - Denies binge drinking episode since 12/2020  Nicotine - Smokes 10 cigarettes daily.  She has been smoking more nicotine and would like to try Chantix again to quit cigarettes.  She denies any struggles with mood or suicidal ideations when she has tried Chantix in the past.  Cannabis - Daily cannabis use   Illicit drugs - Denies    Past Medication Trials:  Lamictal (effective), Zoloft (drug allergy), Prozac (ineffective), Viibryd (s/e - irritability), Effexor (ineffective), Seroquel 25 mg for  "sleep (effective), Geodon, Ritalin (ineffective)    Medications:  Current Outpatient Medications   Medication Sig Dispense Refill   • tizanidine (ZANAFLEX) 4 MG Tab Take 1 tablet by mouth every 6 hours as needed (back pain). 90 tablet 0   • escitalopram (LEXAPRO) 10 MG Tab Take 1 tablet by mouth every day. 90 tablet 0   • carbamazepine SR (TEGRETOL XR) 200 MG TABLET SR 12 HR extended-release tablet Take 1 tablet by mouth 2 times a day. 180 tablet 0   • lisdexamfetamine (VYVANSE) 70 MG capsule Take 1 capsule by mouth every morning for 30 days. 30 capsule 0   • spironolactone (ALDACTONE) 100 MG Tab Take 1 tablet by mouth every day. 90 tablet 0   • triamcinolone acetonide (KENALOG) 0.1 % Cream Apply thin layer to affected area twice a day as needed for rash, do not use longer than 14 days at a time. 45 g 1   • multivitamin (THERAGRAN) Tab Take 1 tablet by mouth every day.     • fluticasone (FLONASE) 50 MCG/ACT nasal spray Spray 2 Sprays in nose every day. 1 Bottle 11     No current facility-administered medications for this visit.       Review Of Systems:    Constitutional - Positive for fatigue  Psychiatric - Positive for depression    Physical Examination:  Vital signs: Ht 1.727 m (5' 8\")   BMI 36.80 kg/m²     Musculoskeletal: No abnormal movements.     Mental Status Evaluation:   General: Young white female, dressed in casual attire, good grooming and hygiene, in no apparent distress, calm and cooperative, good eye contact, no psychomotor agitation or retardation  Orientation: Alert and oriented to person, place and time  Recent and remote memory: Grossly intact  Attention span and concentration: Grossly intact  Speech: Spontaneous, normal rate, rhythm and tone  Thought Process: Linear, logical and goal directed  Thought Content: Denies suicidal or homicidal ideations, intent or plan  Perception: Denies auditory or visual hallucinations. No delusions noted  Associations: Intact  Language: Appropriate  Fund of " "knowledge and vocabulary: Grossly adequate  Mood: \"okay\"  Affect: Euthymic, mood congruent  Insight: Good  Judgment: Good    Depression screening:  Depression Screen (PHQ-2/PHQ-9) 9/30/2019 2/21/2020 1/21/2021   PHQ-2 Total Score 4 - -   PHQ-2 Total Score - - -   PHQ-2 Total Score - 2 2   PHQ-9 Total Score 15 - -   PHQ-9 Total Score - - -   PHQ-9 Total Score - 10 15     Interpretation of PHQ-9 Total Score   Score Severity   1-4 No Depression   5-9 Mild Depression   10-14 Moderate Depression   15-19 Moderately Severe Depression   20-27 Severe Depression    Medical Records/Labs/Diagnostic Tests Reviewed:  [place] [facility] records - appropriate refills, no abuse suspected       Impression:  1.  Major depressive disorder, recurrent, in partial remission - stable  2.  Binge eating disorder, mild - stable  3.  Alcohol use disorder, mild (last binge drinking episode ~ mid Dec 2020) - stable  4.  History of bipolar mood disorder and psychotic symptoms  5.  Nicotine use disorder- worsening    Plan:  1.  Continue Lexapro 10 mg daily for depression.  2.  Continue Tegretol  mg twice daily for mood stabilization.    3.  Continue Vyvanse 70 mg in the morning for binge eating disorder.  E-prescribed x 3 months.  4.  Start Chantix for nicotine use disorder.  I have advised her to watch for worsening depression or suicidal ideations and if she notices either to stop taking Chantix.  5.  I have advised her to continue avoiding any alcohol use  6.  Continue individual psychotherapy with Keesha Fernando, will be contacting her to restart virtual therapy appointments through her practice in [place].  7.  Provided supportive psychotherapy (> 16 minutes) in regards to her mental health and recent break-up.  Provided emotional support in regards to the break-up.  Advised her to maintain boundaries with him and to figure out what she wants from herself in relationship.    Return to clinic in 3 months or sooner if symptoms worsen    The " proposed treatment plan was discussed with the patient who was provided the opportunity to ask questions and make suggestions regarding alternative treatment. Patient verbalized understanding and expressed agreement with the plan.     Sonja lAvarez M.D.  06/07/21    This note was created using voice recognition software (Dragon). The accuracy of the dictation is limited by the abilities of the software. I have reviewed the note prior to signing, however some errors in grammar and context are still possible. If you have any questions related to this note please do not hesitate to contact our office.

## 2021-06-22 ENCOUNTER — PHYSICAL THERAPY (OUTPATIENT)
Dept: PHYSICAL THERAPY | Facility: REHABILITATION | Age: 31
End: 2021-06-22
Attending: FAMILY MEDICINE
Payer: COMMERCIAL

## 2021-06-22 DIAGNOSIS — M53.86 LOW BACK DERANGEMENT SYNDROME: ICD-10-CM

## 2021-06-22 PROCEDURE — 97161 PT EVAL LOW COMPLEX 20 MIN: CPT

## 2021-06-22 ASSESSMENT — ENCOUNTER SYMPTOMS
PAIN SCALE: 1
PAIN SCALE AT HIGHEST: 10
PAIN SCALE AT LOWEST: 0

## 2021-06-24 ENCOUNTER — APPOINTMENT (OUTPATIENT)
Dept: PHYSICAL THERAPY | Facility: REHABILITATION | Age: 31
End: 2021-06-24
Attending: FAMILY MEDICINE
Payer: COMMERCIAL

## 2021-06-29 ENCOUNTER — APPOINTMENT (OUTPATIENT)
Dept: PHYSICAL THERAPY | Facility: REHABILITATION | Age: 31
End: 2021-06-29
Attending: FAMILY MEDICINE
Payer: COMMERCIAL

## 2021-07-07 ENCOUNTER — PHYSICAL THERAPY (OUTPATIENT)
Dept: PHYSICAL THERAPY | Facility: REHABILITATION | Age: 31
End: 2021-07-07
Attending: FAMILY MEDICINE
Payer: COMMERCIAL

## 2021-07-07 DIAGNOSIS — M53.86 LOW BACK DERANGEMENT SYNDROME: ICD-10-CM

## 2021-07-07 PROCEDURE — 97110 THERAPEUTIC EXERCISES: CPT

## 2021-07-07 PROCEDURE — 97012 MECHANICAL TRACTION THERAPY: CPT

## 2021-07-07 NOTE — OP THERAPY DAILY TREATMENT
"  Outpatient Physical Therapy  DAILY TREATMENT     [facility] Physical [address].  [address]  [place] [address]  Phone:  [phone]  Fax:  [phone]    Date: 07/07/2021    Patient: Ryanne Story  YOB: 1990  MRN: [id number]     Time Calculation    Start time: 0945  Stop time: 1036 Time Calculation (min): 51 minutes         Chief Complaint: Back Problem    Visit #: 2    SUBJECTIVE:  Has been doing extension stretches at home.     OBJECTIVE:      Therapeutic Exercises (CPT 90894):     1. Quadruped single leg lift, x15 , cues for hip stability    2. Foam roll (pink); airplane, x20, reported discomfort to thoracic spine; no radiation    3. Seated reaching with fingers touching, 30sec hold, encouraged for HEP every hour    4. Foam roll (pink) elbows bent , x20, discomfort to thoracic spine, no radiation    5. Standing against wall, posterior head push, x20 with 5sec hold    Therapeutic Treatments and Modalities:     1. Mechanical Traction (CPT 79847), Traction 75% weight. 10mins. Reported, \"feels amazing\" post tx.     Therapeutic Treatment and Modalities Summary: Reviewed prone extension to extended arms, standing extension. No pain, no radiation.     Time-based treatments/modalities:    Physical Therapy Timed Treatment Charges  Therapeutic exercise minutes (CPT 82980): 36 minutes    ASSESSMENT:   Response to treatment: Pt with good understanding of mechanics and need to improve spinal posture/extension. Requires cues for stability to reduce compensatory movement, especially at hips. Will benefit from review of neutral spine/spine precautions.     PLAN/RECOMMENDATIONS:   Plan for treatment: therapy treatment to continue next visit.  Planned interventions for next visit: continue with current treatment.       "

## 2021-07-09 ENCOUNTER — PHYSICAL THERAPY (OUTPATIENT)
Dept: PHYSICAL THERAPY | Facility: REHABILITATION | Age: 31
End: 2021-07-09
Attending: FAMILY MEDICINE
Payer: COMMERCIAL

## 2021-07-09 DIAGNOSIS — M53.86 LOW BACK DERANGEMENT SYNDROME: ICD-10-CM

## 2021-07-09 PROCEDURE — 97110 THERAPEUTIC EXERCISES: CPT

## 2021-07-09 NOTE — OP THERAPY DAILY TREATMENT
"  Outpatient Physical Therapy  DAILY TREATMENT     [facility] Physical [address].  [address]  [place] [address]  Phone:  [phone]  Fax:  [phone]    Date: 07/09/2021    Patient: Ryanne Story  YOB: 1990  MRN: [id number]     Time Calculation    Start time: 1317  Stop time: 1359 Time Calculation (min): 42 minutes         Chief Complaint: Back Problem    Visit #: 3    SUBJECTIVE:  Has been doing seated \"elliptical\" while she works:     OBJECTIVE:  Current objective measures:        Therapeutic Exercises (CPT 67090):     1. Quadruped single leg lift, x15 , cues for hip stability    2. Foam roll (pink); airplane, x20, no radiation; \"hurt so good\" not reproduction in pain.     3. Seated reaching with fingers touching, 30sec hold, encouraged for HEP every hour; verbal review    4. Foam roll (pink) elbows bent , x20, discomfort to thoracic spine, no radiation    5. Standing against wall, posterior head push, x20 with 5sec hold, verbal review    6. Pink foam roll chin tuck, x15, excellent technique. HEP    7. Prone extension with figure 4, x10 R and L , (L figure 4 to forearms only due to R pain) R figure 4 without pain to full press up    8. Standing rows against gravity, x20, excellent form: scap retraction and no upper trap engagement.     9. Lateral spinal flexion in standing , x10 B, can reach to knee crease    Therapeutic Treatments and Modalities:     Therapeutic Treatment and Modalities Summary: Traction next time.     Time-based treatments/modalities:    Physical Therapy Timed Treatment Charges  Therapeutic exercise minutes (CPT 89973): 42 minutes        ASSESSMENT:   Response to treatment: Demonstrating improved awareness to flexed posture. Pain has traveled to thoracic spine but pt has denied cervical or lumbar. She does demonstrate poor stability to dynamic LE activity with compensatory pelvic motion, likely due to weak multifidi/paraspinals.  "     PLAN/RECOMMENDATIONS:   Plan for treatment: therapy treatment to continue next visit.  Planned interventions for next visit: continue with current treatment.

## 2021-07-13 ENCOUNTER — PHYSICAL THERAPY (OUTPATIENT)
Dept: PHYSICAL THERAPY | Facility: REHABILITATION | Age: 31
End: 2021-07-13
Attending: FAMILY MEDICINE
Payer: COMMERCIAL

## 2021-07-13 DIAGNOSIS — M53.86 LOW BACK DERANGEMENT SYNDROME: ICD-10-CM

## 2021-07-13 PROCEDURE — 97012 MECHANICAL TRACTION THERAPY: CPT

## 2021-07-13 PROCEDURE — 97110 THERAPEUTIC EXERCISES: CPT

## 2021-07-13 NOTE — OP THERAPY DAILY TREATMENT
Outpatient Physical Therapy  DAILY TREATMENT     [facility] Physical [address].  [address]  [place] [address]  Phone:  [phone]  Fax:  [phone]    Date: 07/13/2021    Patient: Ryanne Story  YOB: 1990  MRN: [id number]     Time Calculation    Start time: 0903  Stop time: 1004 Time Calculation (min): 61 minutes         Chief Complaint: Back Problem    Visit #: 4    SUBJECTIVE:  Pt reporting life stressors today and also an increase in mid-thoracic discomfort a few hours after last session.     OBJECTIVE:  Current objective measures:   In standing, pt noted to have slight R low thoracic/high lumbar C-curve.        Therapeutic Exercises (CPT 46924):     1. Prone leg lifts, 2x15 able to perform on forearms, cues for hip stability    2. Foam roll (pink); airplane, x20, no radiation; no reproduction in pain.     3. Seated reaching with fingers touching, 30sec hold, encouraged for HEP every hour; verbal review    4. Foam roll (pink) elbows bent , x20, discomfort to thoracic spine, no radiation    5. Standing against wall, posterior head push, x20 with 5sec hold, deferred today    6. Pink foam roll chin tuck, x15, excellent technique. HEP    7. Prone extension with figure 4, x10 R and L , deferred today    8. Standing rows against gravity, x20, excellent form: scap retraction and no upper trap engagement.     9. Lateral spinal flexion/hip touches to R in standing to wall, x10 , no repro of pain    10. Prone press ups to extended arms, x15    11. Standing extension with ball overhead    Therapeutic Treatments and Modalities:     1. Mechanical Traction (CPT 43358), lumbar 100/60# 15mins with heat    Time-based treatments/modalities:    Physical Therapy Timed Treatment Charges  Therapeutic exercise minutes (CPT 47837): 44 minutes        ASSESSMENT:   Response to treatment: Pt with a visual improvement in lumbar extension today but continues to have compensatory hip rotation  during activation of R paraspinals/gluts. She has been compliant with HEP and will benefit from ongoing extension work and postural correction. Will re-evaluate slight C-curve next session as it is likely contributing to discomfort when joint is further closed (with extension and sidebend).      PLAN/RECOMMENDATIONS:   Plan for treatment: therapy treatment to continue next visit.  Planned interventions for next visit: continue with current treatment.

## 2021-07-16 ENCOUNTER — PHYSICAL THERAPY (OUTPATIENT)
Dept: PHYSICAL THERAPY | Facility: REHABILITATION | Age: 31
End: 2021-07-16
Attending: FAMILY MEDICINE
Payer: COMMERCIAL

## 2021-07-16 DIAGNOSIS — M53.86 LOW BACK DERANGEMENT SYNDROME: ICD-10-CM

## 2021-07-16 PROCEDURE — 97110 THERAPEUTIC EXERCISES: CPT

## 2021-07-16 PROCEDURE — 97012 MECHANICAL TRACTION THERAPY: CPT

## 2021-07-16 NOTE — OP THERAPY DAILY TREATMENT
Outpatient Physical Therapy  DAILY TREATMENT     [facility] Physical [address].  [address]  [place] [address]  Phone:  [phone]  Fax:  [phone]    Date: 07/16/2021    Patient: Ryanne Story  YOB: 1990  MRN: [id number]     Time Calculation    Start time: 1318  Stop time: 1414 Time Calculation (min): 56 minutes         Chief Complaint: Back Problem    Visit #: 5    SUBJECTIVE:  Reported feeling better pain-wise today.     OBJECTIVE:  Current objective measures:      Therapeutic Exercises (CPT 21871):     1. Prone leg lifts, x20 R/L able to perform on forearms, pt able to self-correct for pelvic stability    2. Foam roll (pink); airplane, x20, no radiation; no reproduction in pain.     3. Seated reaching with fingers touching, 30sec hold, encouraged for HEP every hour; verbal review    4. Foam roll (pink) elbows bent , x20, slight discomfort to mid-thoracic pain.     5. Standing against wall, posterior head push, x20 with 5sec hold, deferred today    6. Pink foam roll chin tuck, x15, verbal review    7. Prone extension with figure 4, x10 R and L , deferred today    8. Standing rows , x20 wtih pink TB, excellent form: scap retraction and no upper trap engagement.     9. Lateral spinal flexion/hip touches to R in standing to wall, x15, no repro of pain    10. Prone press ups to extended arms, x15    11. Standing extension with ball overhead, x15, no pain    12. Foam roll (pink) shuolder flexion/extension swimmers, [id number], pain-free    13. Standing extension with ball lift, x20, pain free.       Therapeutic Exercise Summary: Began with prone extension with elbow extension.   Pt reporting feeling as though R achilles has not been sliding as smoothly as L. Upon palpation, is not smoothly shortening during contraction of gastroc/soleus. No pain noted.     Therapeutic Treatments and Modalities:     1. Mechanical Traction (CPT 21622), lumbar 100/60# 15mins with  heat      ASSESSMENT:   Response to treatment: Pt continues to improve her postural awareness. During exercises that require pelvic stability or reduction in compensatory strategy, pt actively corrects without cueing. She is challenged with repeated extension activity but has not had an increase in pain.     PLAN/RECOMMENDATIONS:   Plan for treatment: therapy treatment to continue next visit.  Planned interventions for next visit: continue with current treatment.

## 2021-07-26 ENCOUNTER — PATIENT MESSAGE (OUTPATIENT)
Dept: MEDICAL GROUP | Facility: MEDICAL CENTER | Age: 31
End: 2021-07-26

## 2021-08-02 ENCOUNTER — PHYSICAL THERAPY (OUTPATIENT)
Dept: PHYSICAL THERAPY | Facility: REHABILITATION | Age: 31
End: 2021-08-02
Attending: FAMILY MEDICINE
Payer: COMMERCIAL

## 2021-08-02 DIAGNOSIS — M53.86 LOW BACK DERANGEMENT SYNDROME: ICD-10-CM

## 2021-08-02 PROCEDURE — 97012 MECHANICAL TRACTION THERAPY: CPT

## 2021-08-02 PROCEDURE — 97110 THERAPEUTIC EXERCISES: CPT

## 2021-08-02 NOTE — OP THERAPY DAILY TREATMENT
Outpatient Physical Therapy  DAILY TREATMENT     [facility] Physical [address].  [address]  [place] [address]  Phone:  [phone]  Fax:  [phone]    Date: 08/02/2021    Patient: Ryanne Story  YOB: 1990  MRN: [id number]     Time Calculation    Start time: 1315  Stop time: 1415 Time Calculation (min): 60 minutes         Chief Complaint: Back Problem    Visit #: 6    SUBJECTIVE:  Pt reporting she has been completing HEP. Shoulders are feeling more stiff as well. Ordered a foam roller.     OBJECTIVE:  Current objective measures:      Therapeutic Exercises (CPT 70827):     1. Seated reaching with fingers touching, HEP, encouraged for HEP every hour; verbal review    3. Foam roll (pink); airplane, x20, no radiation; no reproduction in pain.     4. Foam roll (pink) elbows bent , x15, no pain with this today    5. Foam roll (pink) shuolder flexion/extension swimmers, [id number], pain-free    6. Pink foam roll chin tuck, 0, deferred today    7. Prone extension (elbows extended) with figure 4, x10 R and L , painful at lumbosacral junction wtih L figure 4, No pain with R figure 4.     8. Prone press ups to extended arms, x15, feels tight, not painful.     9. Lateral spinal flexion/hip touches to R in standing to wall, x15, no repro of pain    11. Standing holding 3lb stick shoulder flexion, x15, no pain    12. Standing rows , 0, HEP    13. Standing against wall, posterior head push, x20 with 5sec hold, deferred today    14. Leaning forward on wall, hip extension on disc, x15 B    15. Quadruped single leg lift, x15, cues for pelvic stability/reducing rotation    Therapeutic Treatments and Modalities:     1. Mechanical Traction (CPT 41670), lumbar 100/60# 15mins with heat    Time-based treatments/modalities:    ASSESSMENT:   Response to treatment: Pt with reduced reproduction of pain during extension activity. Pain has centralized to lumbosacral junction but with out increase  during repetitive extension.     PLAN/RECOMMENDATIONS:   Plan for treatment: therapy treatment to continue next visit.  Planned interventions for next visit: continue with current treatment.

## 2021-08-04 ENCOUNTER — PHYSICAL THERAPY (OUTPATIENT)
Dept: PHYSICAL THERAPY | Facility: REHABILITATION | Age: 31
End: 2021-08-04
Attending: FAMILY MEDICINE
Payer: COMMERCIAL

## 2021-08-04 DIAGNOSIS — M53.86 LOW BACK DERANGEMENT SYNDROME: ICD-10-CM

## 2021-08-04 PROCEDURE — 97012 MECHANICAL TRACTION THERAPY: CPT

## 2021-08-04 PROCEDURE — 97110 THERAPEUTIC EXERCISES: CPT

## 2021-08-04 NOTE — OP THERAPY DAILY TREATMENT
"  Outpatient Physical Therapy  DAILY TREATMENT     [facility] Physical [address].  [address]  [place] [address]  Phone:  [phone]  Fax:  [phone]    Date: 08/04/2021    Patient: Ryanne Story  YOB: 1990  MRN: [id number]     Time Calculation    Start time: 1401  Stop time: 1500 Time Calculation (min): 59 minutes         Chief Complaint: Back Problem    Visit #: 7    SUBJECTIVE:  Feels more aware of posture and foam roller will arrive next week.     OBJECTIVE:  Current objective measures:       Therapeutic Exercises (CPT 48123):     1. Seated reaching with fingers touching, HEP, encouraged for HEP every hour; verbal review    3. Foam roll (pink); airplane, x20, no radiation; no reproduction in pain.     4. Foam roll (pink) elbows bent , 0    5. Foam roll (pink) shuolder flexion/extension swimmers, [id number], pain-free; stable on roll     6. Pink foam roll chin tuck, 0, deferred today    7. Prone extension (elbows extended) with figure 4, x15 B; added to HEP, initially, painful to R low during L figure four    8. Prone press ups to extended arms, 0, verbal review; pt has been performing at home    9. Lateral spinal flexion/hip touches to R in standing to wall, x15, added additional pillow, no pain    11. Standing holding 3lb stick shoulder flexion, 0, 0    12. Standing rows , 0, HEP    13. Standing against wall, posterior head push, x20 with 5sec hold, deferred today    14. Leaning forward on wall, hip extension on disc, x15 B    15. Quadruped single leg lift, 0, 0    16. Hip hikes on 2\" step, x15 B, no pain, \"crunchy\"    17. Tandem stance with ball overhead, x15 B, cues for lumbar extension, knee position, balance. Pt greatly challened with narrow KAN    Therapeutic Treatments and Modalities:     1. Mechanical Traction (CPT 72985), lumbar 100/60# 15mins with heat    Time-based treatments/modalities:    Physical Therapy Timed Treatment Charges  Therapeutic exercise " minutes (CPT 87809): 46 minutes    ASSESSMENT:   Response to treatment: Pt greatly challenged with narrowing of her base of support. She requires extensive manual and verbal cues to achieve TA activation with inconsistent recruitment. Upon initial repetition of extension in figure-four, pt with R low back pain that resolved within 10reps. Will require reinforcement for extension-based exercises.      PLAN/RECOMMENDATIONS:   Plan for treatment: therapy treatment to continue next visit.  Planned interventions for next visit: continue with current treatment.

## 2021-08-07 DIAGNOSIS — L73.2 SUPPURATIVE HIDRADENITIS: ICD-10-CM

## 2021-08-07 RX ORDER — SPIRONOLACTONE 100 MG/1
100 TABLET, FILM COATED ORAL DAILY
Qty: 90 TABLET | Refills: 1 | Status: SHIPPED | OUTPATIENT
Start: 2021-08-07 | End: 2022-01-31

## 2021-08-09 ENCOUNTER — PHYSICAL THERAPY (OUTPATIENT)
Dept: PHYSICAL THERAPY | Facility: REHABILITATION | Age: 31
End: 2021-08-09
Attending: FAMILY MEDICINE
Payer: COMMERCIAL

## 2021-08-09 DIAGNOSIS — M53.86 LOW BACK DERANGEMENT SYNDROME: ICD-10-CM

## 2021-08-09 PROCEDURE — 97110 THERAPEUTIC EXERCISES: CPT

## 2021-08-09 PROCEDURE — 97012 MECHANICAL TRACTION THERAPY: CPT

## 2021-08-09 NOTE — OP THERAPY DAILY TREATMENT
"  Outpatient Physical Therapy  DAILY TREATMENT     [facility] Physical [address].  [address]  [place] [address]  Phone:  [phone]  Fax:  [phone]    Date: 08/09/2021    Patient: Ryanne Story  YOB: 1990  MRN: [id number]     Time Calculation    Start time: 1038  Stop time: 1112 Time Calculation (min): 34 minutes         Chief Complaint: Back Problem    Visit #: 8    SUBJECTIVE:  Back has been feeling much better. Slightly stiff in AM.     OBJECTIVE:  Current objective measures:       Therapeutic Exercises (CPT 06256):     1. Seated reaching with fingers touching, HEP, encouraged for HEP every hour; verbal review    3. Foam roll (pink); airplane, 0, no radiation; no reproduction in pain.     4. Foam roll (pink) elbows bent , 0    5. Foam roll (pink) shuolder flexion/extension swimmers, 0, pain-free; stable on roll     6. Pink foam roll chin tuck, 0, deferred today    7. Prone extension (elbows extended) with figure 4, x15 B; added to HEP, no pain initially; L hip discomfort upon first rep    8. Prone press ups to extended arms, 10, no pain; warmup today    9. Lateral spinal flexion/hip touches to R in standing to wall, x15, two pillows against wall, no pain    10. Prone leg lifts, x10ea, no pain; appropriate activation.     11. Standing shoulder flexion with trunk extension, x10, no pain    13. Standing against wall: chin tuck and pelvic rotation, x20 with 5sec hold    14. Leaning forward on wall, hip extension on disc, x15 B    15. Quadruped single leg lift, 10B, no pain    16. Hip hikes on 2\" step, x15 B, no pain    17. Tandem stance with ball overhead, x15 B, cues for lumbar extension, knee position, balance. Pt greatly challened with narrow KAN      Therapeutic Exercise Summary: Home: prone pressups, figure 4 press up, standing lateral hip slide against pillow to R, cat/cow, standing rows with pink TB    Distributed foam roll exercise sheet.     Therapeutic " Treatments and Modalities:     1. Mechanical Traction (CPT 53977), lumbar 100/60# 15mins with heat    Time-based treatments/modalities:    Physical Therapy Timed Treatment Charges  Therapeutic exercise minutes (CPT 28768): 34 minutes      ASSESSMENT:   Response to treatment: Pt able to perform all exercises through a pain-free ROM. She has been diligent with HEP and repetitive extension activity. Anticipate DC within next few sessions.     PLAN/RECOMMENDATIONS:   Plan for treatment: therapy treatment to continue next visit.  Planned interventions for next visit: continue with current treatment.

## 2021-08-11 ENCOUNTER — PHYSICAL THERAPY (OUTPATIENT)
Dept: PHYSICAL THERAPY | Facility: REHABILITATION | Age: 31
End: 2021-08-11
Attending: FAMILY MEDICINE
Payer: COMMERCIAL

## 2021-08-11 ENCOUNTER — OFFICE VISIT (OUTPATIENT)
Dept: MEDICAL GROUP | Facility: MEDICAL CENTER | Age: 31
End: 2021-08-11
Payer: COMMERCIAL

## 2021-08-11 VITALS
DIASTOLIC BLOOD PRESSURE: 70 MMHG | OXYGEN SATURATION: 97 % | RESPIRATION RATE: 16 BRPM | TEMPERATURE: 97.6 F | WEIGHT: 242 LBS | HEART RATE: 100 BPM | BODY MASS INDEX: 36.68 KG/M2 | HEIGHT: 68 IN | SYSTOLIC BLOOD PRESSURE: 124 MMHG

## 2021-08-11 DIAGNOSIS — E78.5 HYPERLIPIDEMIA, UNSPECIFIED HYPERLIPIDEMIA TYPE: ICD-10-CM

## 2021-08-11 DIAGNOSIS — F17.200 NICOTINE USE DISORDER: ICD-10-CM

## 2021-08-11 DIAGNOSIS — M53.86 LOW BACK DERANGEMENT SYNDROME: ICD-10-CM

## 2021-08-11 PROCEDURE — 97110 THERAPEUTIC EXERCISES: CPT

## 2021-08-11 PROCEDURE — 99213 OFFICE O/P EST LOW 20 MIN: CPT | Performed by: FAMILY MEDICINE

## 2021-08-11 PROCEDURE — 97012 MECHANICAL TRACTION THERAPY: CPT

## 2021-08-11 NOTE — PROGRESS NOTES
"  Subjective:     Ryanne Story is a 31 y.o. female presenting for a follow-up.  She has been unable to recheck her labs since her last visit.  Has been quite stressed and busy with various other obligations.  She is trying to quit tobacco, is down to less than 10/day.  She tried Chantix, but had side effects with it.  She has been using the gum, which has not been helpful.        Current Outpatient Medications:   •  spironolactone (ALDACTONE) 100 MG Tab, Take 1 tablet by mouth every day., Disp: 90 tablet, Rfl: 1  •  [START ON 8/26/2021] lisdexamfetamine (VYVANSE) 70 MG capsule, Take 1 capsule by mouth every morning for 30 days., Disp: 30 capsule, Rfl: 0  •  lisdexamfetamine (VYVANSE) 70 MG capsule, Take 1 capsule by mouth every morning for 30 days., Disp: 30 capsule, Rfl: 0  •  carbamazepine SR (TEGRETOL XR) 200 MG TABLET SR 12 HR extended-release tablet, Take 1 tablet by mouth 2 times a day., Disp: 180 tablet, Rfl: 0  •  escitalopram (LEXAPRO) 10 MG Tab, Take 1 tablet by mouth every day., Disp: 90 tablet, Rfl: 0  •  triamcinolone acetonide (KENALOG) 0.1 % Cream, Apply thin layer to affected area twice a day as needed for rash, do not use longer than 14 days at a time., Disp: 45 g, Rfl: 1  •  multivitamin (THERAGRAN) Tab, Take 1 tablet by mouth every day., Disp: , Rfl:   •  fluticasone (FLONASE) 50 MCG/ACT nasal spray, Spray 2 Sprays in nose every day., Disp: 1 Bottle, Rfl: 11  •  tizanidine (ZANAFLEX) 4 MG Tab, Take 1 tablet by mouth every 6 hours as needed (back pain)., Disp: 90 tablet, Rfl: 0    Objective:     Vitals: /70   Pulse 100   Temp 36.4 °C (97.6 °F)   Resp 16   Ht 1.727 m (5' 8\")   Wt 110 kg (242 lb)   SpO2 97%   BMI 36.80 kg/m²   General: Alert  HEENT: Normocephalic..    Assessment/Plan:     Ryanne was seen today for follow-up.    Diagnoses and all orders for this visit:    Hyperlipidemia, unspecified hyperlipidemia type  Chronic, possibly stable.  Will await her labs.    Nicotine " use disorder  Advised to quit, patient is working on this.        Return in about 6 months (around 2/11/2022) for routine follow up.

## 2021-08-11 NOTE — OP THERAPY DISCHARGE SUMMARY
Outpatient Physical Therapy  DISCHARGE SUMMARY NOTE      [facility] Physical Therapy [address].  [address]  [place] [address]  Phone:  [phone]  Fax:  [phone]    Date of Visit: 08/11/2021    Patient: Ryanne Story  YOB: 1990  MRN: [id number]     Referring Provider: No referring provider defined for this encounter.   Referring Diagnosis Acute bilateral low back pain without sciatica [M54.5]         Functional Assessment Used        Your patient is being discharged from Physical Therapy with the following comments:   · Goals met    ASSESSMENT:    Denies  Pain > 2 for the past 2 weeks--all goals met--indep HEP--cont. Limit with Paraspinal endurance but able to tolerate a vigorous HEP  PLAN/RECOMMENDATIONS:   D/c to an indep HEP         Thee Morales, PT, DPT, OCS    Date: 8/11/2021

## 2021-08-11 NOTE — OP THERAPY DAILY TREATMENT
"  Outpatient Physical Therapy  DAILY TREATMENT     [facility] Physical Therapy [address].  [address]  [place] [address]  Phone:  [phone]  Fax:  [phone]    Date: 08/11/2021    Patient: Ryanne Story  YOB: 1990  MRN: [id number]     Time Calculation    Start time: 0208  Stop time: 0302 Time Calculation (min): 54 minutes         Chief Complaint: No chief complaint on file.    Visit #: 9    SUBJECTIVE:  Doing well with fewer  Bouts of back pain-- no pain > 2/10 past 2 weeks.  indep with sx management    OBJECTIVE:            Therapeutic Treatments and Modalities:     Therapeutic Treatment and Modalities Summary: Core stab level II:    -ball bridge x 60\"  -ball bridge with alternating l.e. Lifts X 10 ea.  -ball bridge with hamstring curls x 15  -superman x 60\"  Ball squats with and without gh flexion  -ball walkouts with l.e. Lift x 10  Mechanical traction  90/50 x 60/20 x 15' w/ mhp    Time-based treatments/modalities:    Physical Therapy Timed Treatment Charges  Therapeutic exercise minutes (CPT 33896): 30 minutes      Pain rating (1-10) before treatment:  0  Pain rating (1-10) after treatment:  0    ASSESSMENT:   Denies  Pain > 2 for the past 2 weeks--all goals met--indep HEP--cont. Limit with   Paraspinal endurance  PLAN/RECOMMENDATIONS:   D/c to an indep HEP     "

## 2021-09-07 ENCOUNTER — TELEMEDICINE (OUTPATIENT)
Dept: BEHAVIORAL HEALTH | Facility: CLINIC | Age: 31
End: 2021-09-07
Payer: COMMERCIAL

## 2021-09-07 DIAGNOSIS — F10.10 ALCOHOL USE DISORDER, MILD, ABUSE: ICD-10-CM

## 2021-09-07 DIAGNOSIS — F33.41 MDD (MAJOR DEPRESSIVE DISORDER), RECURRENT, IN PARTIAL REMISSION (HCC): ICD-10-CM

## 2021-09-07 DIAGNOSIS — F50.81 BINGE-EATING DISORDER, MILD: ICD-10-CM

## 2021-09-07 PROCEDURE — 90833 PSYTX W PT W E/M 30 MIN: CPT | Mod: 95 | Performed by: PSYCHIATRY & NEUROLOGY

## 2021-09-07 PROCEDURE — 99214 OFFICE O/P EST MOD 30 MIN: CPT | Mod: 95 | Performed by: PSYCHIATRY & NEUROLOGY

## 2021-09-07 RX ORDER — LISDEXAMFETAMINE DIMESYLATE CAPSULES 70 MG/1
70 CAPSULE ORAL EVERY MORNING
Qty: 30 CAPSULE | Refills: 0 | Status: SHIPPED | OUTPATIENT
Start: 2021-11-04 | End: 2021-12-04

## 2021-09-07 RX ORDER — ESCITALOPRAM OXALATE 10 MG/1
10 TABLET ORAL DAILY
Qty: 90 TABLET | Refills: 0 | Status: SHIPPED | OUTPATIENT
Start: 2021-09-07 | End: 2021-12-07 | Stop reason: SDUPTHER

## 2021-09-07 RX ORDER — LISDEXAMFETAMINE DIMESYLATE CAPSULES 70 MG/1
70 CAPSULE ORAL EVERY MORNING
Qty: 30 CAPSULE | Refills: 0 | Status: SHIPPED | OUTPATIENT
Start: 2021-10-06 | End: 2021-11-05

## 2021-09-07 RX ORDER — CARBAMAZEPINE 200 MG/1
200 TABLET, EXTENDED RELEASE ORAL 2 TIMES DAILY
Qty: 180 TABLET | Refills: 0 | Status: SHIPPED | OUTPATIENT
Start: 2021-09-07 | End: 2021-12-07 | Stop reason: SDUPTHER

## 2021-09-07 NOTE — PROGRESS NOTES
PSYCHIATRY VIRTUAL VISIT FOLLOW-UP NOTE      Chief Complaint   Patient presents with   • Follow-Up     depression, binge eating       This evaluation was conducted via Zoom using secure and encrypted videoconferencing technology. The patient was in a private location in the [facility].    The patient's identity was confirmed and verbal consent was obtained for this virtual visit.    History Of Present Illness:  Ryanne Story is a 31 y.o. female with major depressive disorder, binge eating disorder, alcohol use disorder, suppurative hidradenitis comes in today for follow up, was last seen 3 months ago.  She has been doing all right in regards to her mental health since her last appointment with me.  She has been having periods where she feels that she is existing but not finding karina in her life.  She has been having a lot of free time at her work place but is not sure what she wants to do for the rest of her life.  She wants to move to better place but is unable at this point.  She is trying to do better routine, is waking up at a good time in the morning and eating healthy.  She has not had much contact with her ex-boyfriend.  She has been getting more affected by the political and social issues as well.  She has been doing all right with her medications.  She has had some binge eating.  She denies having thoughts of wanting to hurt herself.    Social History:   She is single, lives alone in [place], parents live in [place], employed full-time as  for her father's [employer] company.    Substance Use:  Alcohol - She reports 1 episode of binge drinking with her friends  Nicotine - Smokes 10 cigarettes daily, stop taking Chantix because of nausea.  Cannabis - Daily cannabis use   Illicit drugs - Denies    Past Medication Trials:  Lamictal (effective), Zoloft (drug allergy), Prozac (ineffective), Viibryd (s/e - irritability), Effexor (ineffective), Seroquel 25 mg for sleep (effective), Geodon,  Ritalin (ineffective), Chantix (s/e - nausea)    Medications:  Current Outpatient Medications   Medication Sig Dispense Refill   • spironolactone (ALDACTONE) 100 MG Tab Take 1 tablet by mouth every day. 90 tablet 1   • lisdexamfetamine (VYVANSE) 70 MG capsule Take 1 capsule by mouth every morning for 30 days. 30 capsule 0   • carbamazepine SR (TEGRETOL XR) 200 MG TABLET SR 12 HR extended-release tablet Take 1 tablet by mouth 2 times a day. 180 tablet 0   • escitalopram (LEXAPRO) 10 MG Tab Take 1 tablet by mouth every day. 90 tablet 0   • tizanidine (ZANAFLEX) 4 MG Tab Take 1 tablet by mouth every 6 hours as needed (back pain). 90 tablet 0   • triamcinolone acetonide (KENALOG) 0.1 % Cream Apply thin layer to affected area twice a day as needed for rash, do not use longer than 14 days at a time. 45 g 1   • multivitamin (THERAGRAN) Tab Take 1 tablet by mouth every day.     • fluticasone (FLONASE) 50 MCG/ACT nasal spray Spray 2 Sprays in nose every day. 1 Bottle 11     No current facility-administered medications for this visit.       Review Of Systems:    Constitutional - Positive for fatigue  Psychiatric - Positive for depression    Physical Examination:  Vital signs: There were no vitals taken for this visit.    Musculoskeletal: No abnormal movements.     Mental Status Evaluation:   General: Young white female, dressed in casual attire, good grooming and hygiene, in no apparent distress, calm and cooperative, good eye contact, no psychomotor agitation or retardation  Orientation: Alert and oriented to person, place and time  Recent and remote memory: Grossly intact  Attention span and concentration: Grossly intact  Speech: Spontaneous, normal rate, rhythm and tone  Thought Process: Linear, logical and goal directed  Thought Content: Denies suicidal or homicidal ideations, intent or plan  Perception: No delusions noted  Associations: Intact  Language: Appropriate  Fund of knowledge and vocabulary: Grossly  "adequate  Mood: \"okay\"  Affect: Euthymic, mood congruent  Insight: Good  Judgment: Good    Depression screening:  Depression Screen (PHQ-2/PHQ-9) 9/30/2019 2/21/2020 1/21/2021   PHQ-2 Total Score 4 - -   PHQ-2 Total Score - - -   PHQ-2 Total Score - 2 2   PHQ-9 Total Score 15 - -   PHQ-9 Total Score - - -   PHQ-9 Total Score - 10 15     Interpretation of PHQ-9 Total Score   Score Severity   1-4 No Depression   5-9 Mild Depression   10-14 Moderate Depression   15-19 Moderately Severe Depression   20-27 Severe Depression    Medical Records/Labs/Diagnostic Tests Reviewed:  [place] PDMP records - appropriate refills, no abuse suspected       Impression:  1.  Major depressive disorder, recurrent, in partial remission - stable  2.  Binge eating disorder, mild - stable  3.  Alcohol use disorder, mild (last binge drinking episode ~summer 2021) - stable    Plan:  1.  Continue Lexapro 10 mg daily for depression.  2.  Continue Tegretol  mg twice daily for mood stabilization.    3.  Continue Vyvanse 70 mg in the morning for binge eating disorder.  E-prescribed x 2 months.  4.  Discontinue Chantix due to side effects.  5.  I have advised her to continue avoiding any alcohol use  6.  Continue individual psychotherapy through [facility]  7.  Provided supportive psychotherapy (> 16 minutes).  Discussed how she has been feeling \"stagnant\" and encouraged her to focus on having some long-term goals that she can work on achieving.  Encouraged her to try doing different things to see what brings karina in her life.  Encouraged going out for walks or joining the gym which should help with her physical and mental health.    Return to clinic in 3 months or sooner if symptoms worsen    The proposed treatment plan was discussed with the patient who was provided the opportunity to ask questions and make suggestions regarding alternative treatment. Patient verbalized understanding and expressed agreement with the plan.     Sonja Alvarez, " M.D.  09/07/21    This note was created using voice recognition software (Dragon). The accuracy of the dictation is limited by the abilities of the software. I have reviewed the note prior to signing, however some errors in grammar and context are still possible. If you have any questions related to this note please do not hesitate to contact our office.

## 2021-12-07 ENCOUNTER — TELEMEDICINE (OUTPATIENT)
Dept: BEHAVIORAL HEALTH | Facility: CLINIC | Age: 31
End: 2021-12-07
Payer: COMMERCIAL

## 2021-12-07 VITALS — BODY MASS INDEX: 36.8 KG/M2 | HEIGHT: 68 IN

## 2021-12-07 DIAGNOSIS — F33.41 MDD (MAJOR DEPRESSIVE DISORDER), RECURRENT, IN PARTIAL REMISSION (HCC): ICD-10-CM

## 2021-12-07 DIAGNOSIS — F10.10 ALCOHOL USE DISORDER, MILD, ABUSE: ICD-10-CM

## 2021-12-07 DIAGNOSIS — F50.81 BINGE-EATING DISORDER, MILD: ICD-10-CM

## 2021-12-07 PROCEDURE — 99214 OFFICE O/P EST MOD 30 MIN: CPT | Mod: 95 | Performed by: PSYCHIATRY & NEUROLOGY

## 2021-12-07 PROCEDURE — 90833 PSYTX W PT W E/M 30 MIN: CPT | Mod: 95 | Performed by: PSYCHIATRY & NEUROLOGY

## 2021-12-07 RX ORDER — LISDEXAMFETAMINE DIMESYLATE CAPSULES 70 MG/1
70 CAPSULE ORAL EVERY MORNING
Qty: 30 CAPSULE | Refills: 0 | Status: SHIPPED | OUTPATIENT
Start: 2021-12-11 | End: 2022-01-10

## 2021-12-07 RX ORDER — CARBAMAZEPINE 200 MG/1
200 TABLET, EXTENDED RELEASE ORAL 2 TIMES DAILY
Qty: 180 TABLET | Refills: 0 | Status: SHIPPED | OUTPATIENT
Start: 2021-12-07 | End: 2022-03-08 | Stop reason: SDUPTHER

## 2021-12-07 RX ORDER — LISDEXAMFETAMINE DIMESYLATE CAPSULES 70 MG/1
70 CAPSULE ORAL EVERY MORNING
Qty: 30 CAPSULE | Refills: 0 | Status: SHIPPED | OUTPATIENT
Start: 2022-02-07 | End: 2022-03-09

## 2021-12-07 RX ORDER — LISDEXAMFETAMINE DIMESYLATE CAPSULES 70 MG/1
70 CAPSULE ORAL EVERY MORNING
Qty: 30 CAPSULE | Refills: 0 | Status: SHIPPED | OUTPATIENT
Start: 2022-01-09 | End: 2022-02-08

## 2021-12-07 RX ORDER — ESCITALOPRAM OXALATE 10 MG/1
10 TABLET ORAL DAILY
Qty: 90 TABLET | Refills: 0 | Status: SHIPPED | OUTPATIENT
Start: 2021-12-07 | End: 2022-03-08 | Stop reason: SDUPTHER
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
## 2022-01-26 ENCOUNTER — PATIENT MESSAGE (OUTPATIENT)
Dept: MEDICAL GROUP | Facility: MEDICAL CENTER | Age: 32
End: 2022-01-26

## 2022-01-27 RX ORDER — ALBUTEROL SULFATE 90 UG/1
2 AEROSOL, METERED RESPIRATORY (INHALATION) EVERY 4 HOURS PRN
Qty: 1 EACH | Refills: 0 | Status: SHIPPED | OUTPATIENT
Start: 2022-01-27

## 2022-01-30 DIAGNOSIS — L73.2 SUPPURATIVE HIDRADENITIS: ICD-10-CM

## 2022-01-31 RX ORDER — SPIRONOLACTONE 100 MG/1
100 TABLET, FILM COATED ORAL DAILY
Qty: 90 TABLET | Refills: 1 | Status: SHIPPED | OUTPATIENT
Start: 2022-01-31 | End: 2022-07-13

## 2022-03-08 ENCOUNTER — TELEMEDICINE (OUTPATIENT)
Dept: BEHAVIORAL HEALTH | Facility: CLINIC | Age: 32
End: 2022-03-08

## 2022-03-08 DIAGNOSIS — F10.10 ALCOHOL USE DISORDER, MILD, ABUSE: ICD-10-CM

## 2022-03-08 DIAGNOSIS — F33.41 MDD (MAJOR DEPRESSIVE DISORDER), RECURRENT, IN PARTIAL REMISSION (HCC): ICD-10-CM

## 2022-03-08 DIAGNOSIS — F50.81 BINGE-EATING DISORDER, MILD: ICD-10-CM

## 2022-03-08 PROCEDURE — 99214 OFFICE O/P EST MOD 30 MIN: CPT | Mod: 95 | Performed by: PSYCHIATRY & NEUROLOGY

## 2022-03-08 PROCEDURE — 90833 PSYTX W PT W E/M 30 MIN: CPT | Mod: 95 | Performed by: PSYCHIATRY & NEUROLOGY

## 2022-03-08 RX ORDER — LISDEXAMFETAMINE DIMESYLATE CAPSULES 70 MG/1
70 CAPSULE ORAL EVERY MORNING
Qty: 30 CAPSULE | Refills: 0 | Status: SHIPPED | OUTPATIENT
Start: 2022-03-21 | End: 2022-04-20

## 2022-03-08 RX ORDER — ESCITALOPRAM OXALATE 10 MG/1
10 TABLET ORAL DAILY
Qty: 90 TABLET | Refills: 0 | Status: SHIPPED | OUTPATIENT
Start: 2022-03-08 | End: 2022-06-16 | Stop reason: SDUPTHER

## 2022-03-08 RX ORDER — LISDEXAMFETAMINE DIMESYLATE CAPSULES 70 MG/1
70 CAPSULE ORAL EVERY MORNING
Qty: 30 CAPSULE | Refills: 0 | Status: SHIPPED | OUTPATIENT
Start: 2022-04-19 | End: 2022-05-19

## 2022-03-08 RX ORDER — LISDEXAMFETAMINE DIMESYLATE CAPSULES 70 MG/1
70 CAPSULE ORAL EVERY MORNING
Qty: 30 CAPSULE | Refills: 0 | Status: SHIPPED | OUTPATIENT
Start: 2022-05-18 | End: 2022-06-17

## 2022-03-08 RX ORDER — VARENICLINE TARTRATE 1 MG/1
TABLET, FILM COATED ORAL
COMMUNITY
Start: 2022-03-07 | End: 2022-06-16

## 2022-03-08 RX ORDER — CARBAMAZEPINE 200 MG/1
200 TABLET, EXTENDED RELEASE ORAL 2 TIMES DAILY
Qty: 180 TABLET | Refills: 0 | Status: SHIPPED | OUTPATIENT
Start: 2022-03-08 | End: 2022-06-16 | Stop reason: SDUPTHER

## 2022-03-08 ASSESSMENT — PATIENT HEALTH QUESTIONNAIRE - PHQ9
4. FEELING TIRED OR HAVING LITTLE ENERGY: SEVERAL DAYS
SUM OF ALL RESPONSES TO PHQ9 QUESTIONS 1 AND 2: 1
1. LITTLE INTEREST OR PLEASURE IN DOING THINGS: SEVERAL DAYS
3. TROUBLE FALLING OR STAYING ASLEEP OR SLEEPING TOO MUCH: NEARLY EVERY DAY
9. THOUGHTS THAT YOU WOULD BE BETTER OFF DEAD, OR OF HURTING YOURSELF: NOT AT ALL
7. TROUBLE CONCENTRATING ON THINGS, SUCH AS READING THE NEWSPAPER OR WATCHING TELEVISION: NOT AT ALL
SUM OF ALL RESPONSES TO PHQ QUESTIONS 1-9: 6
6. FEELING BAD ABOUT YOURSELF - OR THAT YOU ARE A FAILURE OR HAVE LET YOURSELF OR YOUR FAMILY DOWN: SEVERAL DAYS
8. MOVING OR SPEAKING SO SLOWLY THAT OTHER PEOPLE COULD HAVE NOTICED. OR THE OPPOSITE, BEING SO FIGETY OR RESTLESS THAT YOU HAVE BEEN MOVING AROUND A LOT MORE THAN USUAL: NOT AT ALL
5. POOR APPETITE OR OVEREATING: NOT AT ALL
2. FEELING DOWN, DEPRESSED, IRRITABLE, OR HOPELESS: NOT AT ALL
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
## 2022-06-03 ENCOUNTER — APPOINTMENT (OUTPATIENT)
Dept: MEDICAL GROUP | Facility: MEDICAL CENTER | Age: 32
End: 2022-06-03

## 2022-06-16 ENCOUNTER — TELEMEDICINE (OUTPATIENT)
Dept: BEHAVIORAL HEALTH | Facility: CLINIC | Age: 32
End: 2022-06-16

## 2022-06-16 DIAGNOSIS — F10.10 ALCOHOL USE DISORDER, MILD, ABUSE: ICD-10-CM

## 2022-06-16 DIAGNOSIS — F33.41 MDD (MAJOR DEPRESSIVE DISORDER), RECURRENT, IN PARTIAL REMISSION (HCC): ICD-10-CM

## 2022-06-16 DIAGNOSIS — F50.81 BINGE-EATING DISORDER, MILD: ICD-10-CM

## 2022-06-16 PROCEDURE — 99214 OFFICE O/P EST MOD 30 MIN: CPT | Mod: 95 | Performed by: PSYCHIATRY & NEUROLOGY

## 2022-06-16 PROCEDURE — 90833 PSYTX W PT W E/M 30 MIN: CPT | Mod: 95 | Performed by: PSYCHIATRY & NEUROLOGY

## 2022-06-16 RX ORDER — LISDEXAMFETAMINE DIMESYLATE CAPSULES 70 MG/1
70 CAPSULE ORAL EVERY MORNING
Qty: 30 CAPSULE | Refills: 0 | Status: SHIPPED | OUTPATIENT
Start: 2022-07-24 | End: 2022-08-23

## 2022-06-16 RX ORDER — CARBAMAZEPINE 200 MG/1
200 TABLET, EXTENDED RELEASE ORAL 2 TIMES DAILY
Qty: 180 TABLET | Refills: 0 | Status: SHIPPED | OUTPATIENT
Start: 2022-06-16 | End: 2022-09-20 | Stop reason: SDUPTHER

## 2022-06-16 RX ORDER — LISDEXAMFETAMINE DIMESYLATE CAPSULES 70 MG/1
70 CAPSULE ORAL EVERY MORNING
Qty: 30 CAPSULE | Refills: 0 | Status: SHIPPED | OUTPATIENT
Start: 2022-06-25 | End: 2022-07-25

## 2022-06-16 RX ORDER — ESCITALOPRAM OXALATE 10 MG/1
10 TABLET ORAL DAILY
Qty: 90 TABLET | Refills: 0 | Status: SHIPPED | OUTPATIENT
Start: 2022-06-16 | End: 2022-09-15

## 2022-06-16 RX ORDER — LISDEXAMFETAMINE DIMESYLATE CAPSULES 70 MG/1
70 CAPSULE ORAL EVERY MORNING
Qty: 30 CAPSULE | Refills: 0 | Status: SHIPPED | OUTPATIENT
Start: 2022-08-22 | End: 2022-09-21

## 2022-06-16 NOTE — PROGRESS NOTES
PSYCHIATRY VIRTUAL VISIT FOLLOW-UP NOTE      Chief Complaint   Patient presents with   • Follow-Up     Depression, eating disorder       This evaluation was conducted via Zoom using secure and encrypted videoconferencing technology.   The patient was in their home in the [facility].    The patient's identity was confirmed and verbal consent was obtained for this virtual visit.    History Of Present Illness:  Ryanne Story is a 32 y.o. female with major depressive disorder, binge eating disorder, alcohol use disorder, suppurative hidradenitis comes in today for follow up, was last seen 3 months ago.  She has been doing all right since her last appointment with me.  She moved in with her as getting adjusted to living with them again.  She is hoping that this is a good financial decision for her however, she is more isolated as now she is living in [place].  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  She has a lot of free time on hand which is causing some boredom.  She has been sleeping late and waking up late as well.  She has been compliant with her medications.  She denies any contact with her ex-boyfriend.  She denies having thoughts of wanting to hurt herself.    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    Substance Use:  Alcohol - She has been drinking at least twice at month, denies binge drinking episodes  Nicotine - She has been smoking cigarettes twice a week now  Cannabis - Denies  Illicit drugs - Denies    Past Medication Trials:  Lamictal (effective), Zoloft (drug allergy), Prozac (ineffective), Viibryd (s/e - irritability), Effexor (ineffective), Seroquel 25 mg for sleep (effective), Geodon, Ritalin (ineffective), Chantix (s/e - nausea)    Medications:  Current Outpatient Medications   Medication Sig Dispense Refill   • varenicline (CHANTIX) 1  MG tablet      • lisdexamfetamine (VYVANSE) 70 MG capsule Take 1 Capsule by mouth every morning for 30 days. 30 Capsule 0   • escitalopram (LEXAPRO) 10 MG Tab Take 1 Tablet by mouth every day. 90 Tablet 0   • carbamazepine SR (TEGRETOL XR) 200 MG TABLET SR 12 HR extended-release tablet Take 1 Tablet by mouth 2 times a day. 180 Tablet 0   • spironolactone (ALDACTONE) 100 MG Tab Take 1 Tablet by mouth every day. 90 Tablet 1   • albuterol 108 (90 Base) MCG/ACT Aero Soln inhalation aerosol Inhale 2 Puffs every four hours as needed for Shortness of Breath. 1 Each 0   • tizanidine (ZANAFLEX) 4 MG Tab Take 1 tablet by mouth every 6 hours as needed (back pain). 90 tablet 0   • triamcinolone acetonide (KENALOG) 0.1 % Cream Apply thin layer to affected area twice a day as needed for rash, do not use longer than 14 days at a time. 45 g 1   • multivitamin (THERAGRAN) Tab Take 1 tablet by mouth every day.     • fluticasone (FLONASE) 50 MCG/ACT nasal spray Spray 2 Sprays in nose every day. 1 Bottle 11     No current facility-administered medications for this visit.       Review Of Systems:    Constitutional - Positive for fatigue  Psychiatric - Positive for depression    Physical Examination:  Vital signs: There were no vitals taken for this visit.    Musculoskeletal: No abnormal movements.     Mental Status Evaluation:   General: Young white female, dressed in casual attire, good grooming and hygiene, in no apparent distress, calm and cooperative, good eye contact, no psychomotor agitation or retardation  Orientation: Alert and oriented to person, place and time  Recent and remote memory: Grossly intact  Attention span and concentration: Grossly intact  Speech: Spontaneous, normal rate, rhythm and tone  Thought Process: Linear, logical and goal directed  Thought Content: Denies suicidal or homicidal ideations, intent or plan  Perception: No delusions noted  Associations: Intact  Language: Appropriate  Fund of knowledge and  "vocabulary: Grossly adequate  Mood: \"alright\"  Affect: Euthymic, mood congruent  Insight: Good  Judgment: Good    Depression screening:  Depression Screen (PHQ-2/PHQ-9) 2/21/2020 1/21/2021 3/8/2022   PHQ-2 Total Score - - 1   PHQ-2 Total Score - - -   PHQ-2 Total Score 2 2 -   PHQ-9 Total Score - - 6   PHQ-9 Total Score - - -   PHQ-9 Total Score 10 15 -     Interpretation of PHQ-9 Total Score   Score Severity   1-4 No Depression   5-9 Mild Depression   10-14 Moderate Depression   15-19 Moderately Severe Depression   20-27 Severe Depression    Medical Records/Labs/Diagnostic Tests Reviewed:  [place] PDMP records - appropriate refills, no abuse suspected       Impression:  1.  Major depressive disorder, recurrent, in partial remission - stable  2.  Binge eating disorder, mild - stable  3.  Alcohol use disorder, mild (drinking twice a month, denies binge drinking episodes) - stable    Plan:  1.  Continue Lexapro 10 mg daily for depression.  2.  Continue Tegretol  mg twice daily for mood stabilization.    3.  Continue Vyvanse 70 mg in the morning for binge eating disorder.  E-prescribed x 3 months.  4.  I have advised her to avoid binge alcohol use  5.  Provided supportive psychotherapy (> 16 minutes).  Discussed how she has a lot of free time when she needs to make better use of it.  Encouraged her to look for a purpose including part-time job, online school, hobbies etc.  Encouraged her to be more physically active as well.    Return to clinic in 3 months or sooner if symptoms worsen    The proposed treatment plan was discussed with the patient who was provided the opportunity to ask questions and make suggestions regarding alternative treatment. Patient verbalized understanding and expressed agreement with the plan.     Sonja Alvarez M.D.  06/16/22    This note was created using voice recognition software (Dragon). The accuracy of the dictation is limited by the abilities of the software. I have reviewed the note " prior to signing, however some errors in grammar and context are still possible. If you have any questions related to this note please do not hesitate to contact our office.

## 2022-07-12 DIAGNOSIS — L73.2 SUPPURATIVE HIDRADENITIS: ICD-10-CM

## 2022-07-13 RX ORDER — SPIRONOLACTONE 100 MG/1
100 TABLET, FILM COATED ORAL DAILY
Qty: 90 TABLET | Refills: 0 | Status: SHIPPED | OUTPATIENT
Start: 2022-07-13

## 2022-09-20 ENCOUNTER — TELEMEDICINE (OUTPATIENT)
Dept: BEHAVIORAL HEALTH | Facility: CLINIC | Age: 32
End: 2022-09-20

## 2022-09-20 DIAGNOSIS — F50.81 BINGE-EATING DISORDER, MILD: ICD-10-CM

## 2022-09-20 DIAGNOSIS — F33.41 MDD (MAJOR DEPRESSIVE DISORDER), RECURRENT, IN PARTIAL REMISSION (HCC): ICD-10-CM

## 2022-09-20 DIAGNOSIS — F10.10 ALCOHOL USE DISORDER, MILD, ABUSE: ICD-10-CM

## 2022-09-20 PROCEDURE — 99214 OFFICE O/P EST MOD 30 MIN: CPT | Mod: 95 | Performed by: PSYCHIATRY & NEUROLOGY

## 2022-09-20 RX ORDER — LISDEXAMFETAMINE DIMESYLATE CAPSULES 70 MG/1
70 CAPSULE ORAL EVERY MORNING
Qty: 30 CAPSULE | Refills: 0 | Status: SHIPPED | OUTPATIENT
Start: 2022-11-12 | End: 2022-12-12

## 2022-09-20 RX ORDER — LISDEXAMFETAMINE DIMESYLATE CAPSULES 70 MG/1
70 CAPSULE ORAL EVERY MORNING
Qty: 30 CAPSULE | Refills: 0 | Status: SHIPPED | OUTPATIENT
Start: 2022-12-11 | End: 2023-01-10

## 2022-09-20 RX ORDER — ESCITALOPRAM OXALATE 10 MG/1
10 TABLET ORAL DAILY
Qty: 90 TABLET | Refills: 0 | Status: SHIPPED | OUTPATIENT
Start: 2022-09-20 | End: 2023-01-13 | Stop reason: SDUPTHER

## 2022-09-20 RX ORDER — LISDEXAMFETAMINE DIMESYLATE CAPSULES 70 MG/1
70 CAPSULE ORAL EVERY MORNING
Qty: 30 CAPSULE | Refills: 0 | Status: SHIPPED | OUTPATIENT
Start: 2022-10-14 | End: 2022-11-13

## 2022-09-20 RX ORDER — CARBAMAZEPINE 200 MG/1
200 TABLET, EXTENDED RELEASE ORAL 2 TIMES DAILY
Qty: 180 TABLET | Refills: 0 | Status: SHIPPED | OUTPATIENT
Start: 2022-09-20 | End: 2023-01-13 | Stop reason: SDUPTHER

## 2022-09-20 NOTE — PROGRESS NOTES
PSYCHIATRY VIRTUAL VISIT FOLLOW-UP NOTE      Chief Complaint   Patient presents with    Follow-Up     Depression, eating disorder       This evaluation was conducted via Zoom using secure and encrypted videoconferencing technology.   The patient was in their home in the [facility].    The patient's identity was confirmed and verbal consent was obtained for this virtual visit.    History Of Present Illness:  Ryanne Story is a 32 y.o. female with major depressive disorder, binge eating disorder, alcohol use disorder, suppurative hidradenitis comes in today for follow up, was last seen 3 months ago.  She has been doing all right in regards to her mental health.  She is compliant with medications and is endorsing benefit.  She continues to have a lot of free time on hand and is trying to find ways to fill it.  She financially.  She has been spending time with friends on the weekends.  She denies any recent contact with her ex-boyfriend which has been a relief for her.  She has been doing all right in regards to her eating but has had some problems with sleep here and there.  She has noticed because of all the free time she tends to get bored easily.  She denies having thoughts of wanting to hurt herself.    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    Substance Use:  Alcohol - Denies recent alcohol use  Nicotine - Smokes 2-3 cigarettes daily  Cannabis - Denies  Illicit drugs - Denies    Past Medication Trials:  Lamictal (effective), Zoloft (drug allergy), Prozac (ineffective), Viibryd (s/e - irritability), Effexor (ineffective), Seroquel 25 mg for sleep (effective), Geodon, Ritalin (ineffective), Chantix (s/e - nausea)    Medications:  Current Outpatient Medications   Medication Sig Dispense Refill    escitalopram (LEXAPRO) 10 MG Tab Take 1 Tablet by mouth every day. 30 Tablet 0    spironolactone (ALDACTONE) 100  MG Tab Take 1 Tablet by mouth every day. 90 Tablet 0    diphenhydrAMINE HCl (BENADRYL PO) Take  by mouth. PRN for sleep      lisdexamfetamine (VYVANSE) 70 MG capsule Take 1 Capsule by mouth every morning for 30 days. 30 Capsule 0    carbamazepine SR (TEGRETOL XR) 200 MG TABLET SR 12 HR extended-release tablet Take 1 Tablet by mouth 2 times a day. 180 Tablet 0    albuterol 108 (90 Base) MCG/ACT Aero Soln inhalation aerosol Inhale 2 Puffs every four hours as needed for Shortness of Breath. 1 Each 0    tizanidine (ZANAFLEX) 4 MG Tab Take 1 tablet by mouth every 6 hours as needed (back pain). 90 tablet 0    triamcinolone acetonide (KENALOG) 0.1 % Cream Apply thin layer to affected area twice a day as needed for rash, do not use longer than 14 days at a time. 45 g 1    multivitamin (THERAGRAN) Tab Take 1 tablet by mouth every day.      fluticasone (FLONASE) 50 MCG/ACT nasal spray Spray 2 Sprays in nose every day. 1 Bottle 11     No current facility-administered medications for this visit.       Review Of Systems:    Constitutional - Positive for fatigue  Psychiatric - Positive for infrequent depression, sleep problems    Physical Examination:  Vital signs: There were no vitals taken for this visit.    Musculoskeletal: No abnormal movements.     Mental Status Evaluation:   General: Young white female, dressed in casual attire, good grooming and hygiene, in no apparent distress, calm and cooperative, good eye contact, no psychomotor agitation or retardation  Orientation: Alert and oriented to person, place and time  Recent and remote memory: Grossly intact  Attention span and concentration: Grossly intact  Speech: Spontaneous, normal rate, rhythm and tone  Thought Process: Linear, logical and goal directed  Thought Content: Denies suicidal or homicidal ideations, intent or plan  Perception: No delusions noted  Associations: Intact  Language: Appropriate  Fund of knowledge and vocabulary: Grossly adequate  Mood:  "\"good\"  Affect: Euthymic, mood congruent  Insight: Good  Judgment: Good    Depression screening:  Depression Screen (PHQ-2/PHQ-9) 2/21/2020 1/21/2021 3/8/2022   PHQ-2 Total Score - - 1   PHQ-2 Total Score - - -   PHQ-2 Total Score 2 2 -   PHQ-9 Total Score - - 6   PHQ-9 Total Score - - -   PHQ-9 Total Score 10 15 -     Interpretation of PHQ-9 Total Score   Score Severity   1-4 No Depression   5-9 Mild Depression   10-14 Moderate Depression   15-19 Moderately Severe Depression   20-27 Severe Depression    Medical Records/Labs/Diagnostic Tests Reviewed:  [place] PDMP records - appropriate refills, no abuse suspected       Impression:  1.  Major depressive disorder, recurrent, in partial remission - stable  2.  Binge eating disorder, mild - stable  3.  Alcohol use disorder, mild (no drinking in the last few months) - stable  4.  History of bipolar mood disorder  5.  History of hallucinogen and cocaine use    Plan:  1.  Continue Lexapro 10 mg daily for depression.  2.  Continue Tegretol  mg twice daily for mood stabilization.    3.  Continue Vyvanse 70 mg in the morning for binge eating disorder.  E-prescribed x 3 months.  4.  Continue to avoid heavy and/or daily alcohol use    Return to clinic in 4 months or sooner if symptoms worsen    The proposed treatment plan was discussed with the patient who was provided the opportunity to ask questions and make suggestions regarding alternative treatment. Patient verbalized understanding and expressed agreement with the plan.     Sonja Alvarez M.D.  09/20/22    This note was created using voice recognition software (Dragon). The accuracy of the dictation is limited by the abilities of the software. I have reviewed the note prior to signing, however some errors in grammar and context are still possible. If you have any questions related to this note please do not hesitate to contact our office.   "

## 2023-01-12 ENCOUNTER — APPOINTMENT (OUTPATIENT)
Dept: BEHAVIORAL HEALTH | Facility: CLINIC | Age: 33
End: 2023-01-12

## 2023-01-13 ENCOUNTER — TELEMEDICINE (OUTPATIENT)
Dept: BEHAVIORAL HEALTH | Facility: CLINIC | Age: 33
End: 2023-01-13

## 2023-01-13 DIAGNOSIS — F33.0 MDD (MAJOR DEPRESSIVE DISORDER), RECURRENT EPISODE, MILD (HCC): ICD-10-CM

## 2023-01-13 DIAGNOSIS — F50.81 BINGE-EATING DISORDER, MILD: ICD-10-CM

## 2023-01-13 DIAGNOSIS — F10.10 ALCOHOL USE DISORDER, MILD, ABUSE: ICD-10-CM

## 2023-01-13 PROCEDURE — 90833 PSYTX W PT W E/M 30 MIN: CPT | Mod: 95 | Performed by: PSYCHIATRY & NEUROLOGY

## 2023-01-13 PROCEDURE — 99214 OFFICE O/P EST MOD 30 MIN: CPT | Mod: 95 | Performed by: PSYCHIATRY & NEUROLOGY

## 2023-01-13 RX ORDER — ESCITALOPRAM OXALATE 10 MG/1
10 TABLET ORAL DAILY
Qty: 90 TABLET | Refills: 0 | Status: SHIPPED | OUTPATIENT
Start: 2023-01-13 | End: 2023-04-21 | Stop reason: SDUPTHER

## 2023-01-13 RX ORDER — CARBAMAZEPINE 200 MG/1
200 TABLET, EXTENDED RELEASE ORAL 2 TIMES DAILY
Qty: 180 TABLET | Refills: 0 | Status: SHIPPED | OUTPATIENT
Start: 2023-01-13 | End: 2023-04-21 | Stop reason: SDUPTHER

## 2023-01-13 RX ORDER — LISDEXAMFETAMINE DIMESYLATE CAPSULES 70 MG/1
70 CAPSULE ORAL EVERY MORNING
Qty: 30 CAPSULE | Refills: 0 | Status: SHIPPED | OUTPATIENT
Start: 2023-03-26 | End: 2023-04-25

## 2023-01-13 RX ORDER — LISDEXAMFETAMINE DIMESYLATE CAPSULES 70 MG/1
70 CAPSULE ORAL EVERY MORNING
Qty: 30 CAPSULE | Refills: 0 | Status: SHIPPED | OUTPATIENT
Start: 2023-02-25 | End: 2023-03-27

## 2023-01-13 RX ORDER — LISDEXAMFETAMINE DIMESYLATE CAPSULES 70 MG/1
70 CAPSULE ORAL EVERY MORNING
Qty: 30 CAPSULE | Refills: 0 | Status: SHIPPED | OUTPATIENT
Start: 2023-01-27 | End: 2023-02-26

## 2023-01-13 ASSESSMENT — PATIENT HEALTH QUESTIONNAIRE - PHQ9
SUM OF ALL RESPONSES TO PHQ QUESTIONS 1-9: 14
CLINICAL INTERPRETATION OF PHQ2 SCORE: 4
5. POOR APPETITE OR OVEREATING: 3 - NEARLY EVERY DAY

## 2023-01-13 NOTE — PROGRESS NOTES
PSYCHIATRY VIRTUAL VISIT FOLLOW-UP NOTE    Chief Complaint   Patient presents with    Follow-Up     depression     This evaluation was conducted via Zoom using secure and encrypted videoconferencing technology.   The patient was in their home in the [facility].    The patient's identity was confirmed and verbal consent was obtained for this virtual visit.    History Of Present Illness:  Ryanne Story is a 32 y.o. female with major depressive disorder, binge eating disorder, alcohol use disorder, suppurative hidradenitis comes in today for follow up, was last seen 3 months ago.  She has been having a difficult time with her emotions, has been feeling blah and stuck in life.  She has been having a difficult time motivating herself to do anything.  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  She can get another part-time job but does not have a financial reason to get another job.  She is comfortable living at her parents place for now.  She is doubting herself a lot more which is making it difficult for her to do something different.  She wants to travel this year but does not have any concrete plans yet.  She has been eating only 1 meal a day towards the end of the day and tends to eat a lot more.  She feels that she is eating a lot of times because she is feeling bored.  She has been compliant with her medications.  She has been enjoying spending time with her friends.  She denies having thoughts of wanting to hurt herself.    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    Substance Use:  Alcohol - She has been drinking alcohol in social settings with her friends  Nicotine - Smokes 5 cigarettes daily  Cannabis - Denies  Illicit drugs - Denies    Past Medication Trials:  Lamictal (effective), Zoloft (drug allergy), Prozac (ineffective), Viibryd (s/e - irritability), Effexor  (ineffective), Seroquel 25 mg for sleep (effective), Geodon, Ritalin (ineffective), Chantix (s/e - nausea)    Medications:  Current Outpatient Medications   Medication Sig Dispense Refill    carbamazepine SR (TEGRETOL XR) 200 MG TABLET SR 12 HR extended-release tablet Take 1 Tablet by mouth 2 times a day. 180 Tablet 0    escitalopram (LEXAPRO) 10 MG Tab Take 1 Tablet by mouth every day. 90 Tablet 0    spironolactone (ALDACTONE) 100 MG Tab Take 1 Tablet by mouth every day. 90 Tablet 0    diphenhydrAMINE HCl (BENADRYL PO) Take  by mouth. PRN for sleep      albuterol 108 (90 Base) MCG/ACT Aero Soln inhalation aerosol Inhale 2 Puffs every four hours as needed for Shortness of Breath. 1 Each 0    tizanidine (ZANAFLEX) 4 MG Tab Take 1 tablet by mouth every 6 hours as needed (back pain). 90 tablet 0    triamcinolone acetonide (KENALOG) 0.1 % Cream Apply thin layer to affected area twice a day as needed for rash, do not use longer than 14 days at a time. 45 g 1    multivitamin (THERAGRAN) Tab Take 1 tablet by mouth every day.      fluticasone (FLONASE) 50 MCG/ACT nasal spray Spray 2 Sprays in nose every day. 1 Bottle 11     No current facility-administered medications for this visit.     Review Of Systems:    Constitutional - Positive for fatigue  Psychiatric - Positive for depression    Physical Examination:  Vital signs: There were no vitals taken for this visit.    Musculoskeletal: No abnormal movements.     Mental Status Evaluation:   General: Young white female, dressed in casual attire, good grooming and hygiene, in no apparent distress, calm and cooperative, good eye contact, no psychomotor agitation or retardation  Orientation: Alert and oriented to person, place and time  Recent and remote memory: Grossly intact  Attention span and concentration: Grossly intact  Speech: Spontaneous, normal rate, rhythm and tone  Thought Process: Linear, logical and goal directed  Thought Content: Denies suicidal or homicidal  "ideations, intent or plan  Perception: No delusions noted  Associations: Intact  Language: Appropriate  Fund of knowledge and vocabulary: Grossly adequate  Mood: \"okay\"  Affect: Euthymic, mood congruent  Insight: Good  Judgment: Good    Depression screenin/21/2021 3/8/2022 2023   Depression Screen (PHQ-2/PHQ-9)   PHQ-2 Total Score  1    PHQ-2 Total Score 2  4   PHQ-9 Total Score  6    PHQ-9 Total Score 15  14       Multiple values from one day are sorted in reverse-chronological order     Interpretation of PHQ-9 Total Score   Score Severity   1-4 No Depression   5-9 Mild Depression   10-14 Moderate Depression   15-19 Moderately Severe Depression   20-27 Severe Depression    Medical Records/Labs/Diagnostic Tests Reviewed:  [place] PDMP records - appropriate refills, no abuse suspected       Impression:  1.  Major depressive disorder, recurrent, mild - worsening  2.  Binge eating disorder, mild - stable  3.  Alcohol use disorder, mild - worsening  4.  History of bipolar mood disorder  5.  History of hallucinogen and cocaine use    Plan:  1.  Continue Lexapro 10 mg daily for depression.  It is difficult to currently assess her depression, she is denying anhedonia and her affect is not consistent with depression.  She has been stuck in the situation for a long time where she wants to make a change in her life but does not have a particular goal or end result in mind.  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  She will try her best to achieve this goal and will monitor if her depression is something that is keeping her from achieving this goal.  2.  Continue Tegretol  mg twice daily for mood stabilization.    3.  Continue Vyvanse 70 mg in the morning for binge eating disorder.  E-prescribed x 3 months.  4.  Encouraged to avoid heavy and/or daily alcohol use  5.  Provided supportive psychotherapy (> 16 " minutes): Discussed how she is lacking structure in her life which seems to be affecting her a lot, encouraged her to get a part-time/freelance job just to motivate herself and to see if she can do something different or not, planning a local trip with a friend etc.    Return to clinic in 3 months or sooner if symptoms worsen    The proposed treatment plan was discussed with the patient who was provided the opportunity to ask questions and make suggestions regarding alternative treatment. Patient verbalized understanding and expressed agreement with the plan.     Sonja Alvarez M.D.  01/13/23    This note was created using voice recognition software (Dragon). The accuracy of the dictation is limited by the abilities of the software. I have reviewed the note prior to signing, however some errors in grammar and context are still possible. If you have any questions related to this note please do not hesitate to contact our office.

## 2023-02-18 ENCOUNTER — APPOINTMENT (OUTPATIENT)
Dept: RADIOLOGY | Facility: MEDICAL CENTER | Age: 33
End: 2023-02-18
Attending: EMERGENCY MEDICINE
Payer: COMMERCIAL

## 2023-02-18 ENCOUNTER — ANESTHESIA (OUTPATIENT)
Dept: SURGERY | Facility: MEDICAL CENTER | Age: 33
End: 2023-02-18
Payer: COMMERCIAL

## 2023-02-18 ENCOUNTER — HOSPITAL ENCOUNTER (OUTPATIENT)
Facility: MEDICAL CENTER | Age: 33
End: 2023-02-19
Attending: EMERGENCY MEDICINE | Admitting: SURGERY
Payer: COMMERCIAL

## 2023-02-18 ENCOUNTER — ANESTHESIA EVENT (OUTPATIENT)
Dept: SURGERY | Facility: MEDICAL CENTER | Age: 33
End: 2023-02-18
Payer: COMMERCIAL

## 2023-02-18 DIAGNOSIS — K80.00 CALCULUS OF GALLBLADDER WITH ACUTE CHOLECYSTITIS WITHOUT OBSTRUCTION: ICD-10-CM

## 2023-02-18 PROBLEM — K81.9 CHOLECYSTITIS: Status: ACTIVE | Noted: 2023-02-18

## 2023-02-18 LAB
ALBUMIN SERPL BCP-MCNC: 4.1 G/DL (ref 3.2–4.9)
ALBUMIN/GLOB SERPL: 1.1 G/DL
ALP SERPL-CCNC: 91 U/L (ref 30–99)
ALT SERPL-CCNC: 28 U/L (ref 2–50)
ANION GAP SERPL CALC-SCNC: 13 MMOL/L (ref 7–16)
APPEARANCE UR: CLEAR
AST SERPL-CCNC: 24 U/L (ref 12–45)
BACTERIA #/AREA URNS HPF: NEGATIVE /HPF
BASOPHILS # BLD AUTO: 0.2 % (ref 0–1.8)
BASOPHILS # BLD: 0.03 K/UL (ref 0–0.12)
BILIRUB SERPL-MCNC: 0.2 MG/DL (ref 0.1–1.5)
BILIRUB UR QL STRIP.AUTO: NEGATIVE
BUN SERPL-MCNC: 10 MG/DL (ref 8–22)
CALCIUM ALBUM COR SERPL-MCNC: 9.8 MG/DL (ref 8.5–10.5)
CALCIUM SERPL-MCNC: 9.9 MG/DL (ref 8.5–10.5)
CHLORIDE SERPL-SCNC: 102 MMOL/L (ref 96–112)
CO2 SERPL-SCNC: 23 MMOL/L (ref 20–33)
COLOR UR: YELLOW
CREAT SERPL-MCNC: 0.63 MG/DL (ref 0.5–1.4)
D DIMER PPP IA.FEU-MCNC: 0.35 UG/ML (FEU) (ref 0–0.5)
EKG IMPRESSION: NORMAL
EKG IMPRESSION: NORMAL
EOSINOPHIL # BLD AUTO: 0.17 K/UL (ref 0–0.51)
EOSINOPHIL NFR BLD: 1.1 % (ref 0–6.9)
EPI CELLS #/AREA URNS HPF: NORMAL /HPF
ERYTHROCYTE [DISTWIDTH] IN BLOOD BY AUTOMATED COUNT: 39.5 FL (ref 35.9–50)
FLUAV RNA SPEC QL NAA+PROBE: NEGATIVE
FLUBV RNA SPEC QL NAA+PROBE: NEGATIVE
GFR SERPLBLD CREATININE-BSD FMLA CKD-EPI: 119 ML/MIN/1.73 M 2
GLOBULIN SER CALC-MCNC: 3.7 G/DL (ref 1.9–3.5)
GLUCOSE SERPL-MCNC: 107 MG/DL (ref 65–99)
GLUCOSE UR STRIP.AUTO-MCNC: NEGATIVE MG/DL
HCG SERPL QL: NEGATIVE
HCT VFR BLD AUTO: 41.9 % (ref 37–47)
HGB BLD-MCNC: 14.7 G/DL (ref 12–16)
HYALINE CASTS #/AREA URNS LPF: NORMAL /LPF
IMM GRANULOCYTES # BLD AUTO: 0.08 K/UL (ref 0–0.11)
IMM GRANULOCYTES NFR BLD AUTO: 0.5 % (ref 0–0.9)
KETONES UR STRIP.AUTO-MCNC: ABNORMAL MG/DL
LEUKOCYTE ESTERASE UR QL STRIP.AUTO: ABNORMAL
LIPASE SERPL-CCNC: 20 U/L (ref 11–82)
LYMPHOCYTES # BLD AUTO: 2.65 K/UL (ref 1–4.8)
LYMPHOCYTES NFR BLD: 17.3 % (ref 22–41)
MCH RBC QN AUTO: 30.1 PG (ref 27–33)
MCHC RBC AUTO-ENTMCNC: 35.1 G/DL (ref 33.6–35)
MCV RBC AUTO: 85.7 FL (ref 81.4–97.8)
MICRO URNS: ABNORMAL
MONOCYTES # BLD AUTO: 0.57 K/UL (ref 0–0.85)
MONOCYTES NFR BLD AUTO: 3.7 % (ref 0–13.4)
NEUTROPHILS # BLD AUTO: 11.79 K/UL (ref 2–7.15)
NEUTROPHILS NFR BLD: 77.2 % (ref 44–72)
NITRITE UR QL STRIP.AUTO: NEGATIVE
NRBC # BLD AUTO: 0 K/UL
NRBC BLD-RTO: 0 /100 WBC
PH UR STRIP.AUTO: 6 [PH] (ref 5–8)
PLATELET # BLD AUTO: 356 K/UL (ref 164–446)
PMV BLD AUTO: 9.8 FL (ref 9–12.9)
POTASSIUM SERPL-SCNC: 3.8 MMOL/L (ref 3.6–5.5)
PROT SERPL-MCNC: 7.8 G/DL (ref 6–8.2)
PROT UR QL STRIP: NEGATIVE MG/DL
RBC # BLD AUTO: 4.89 M/UL (ref 4.2–5.4)
RBC # URNS HPF: NORMAL /HPF
RBC UR QL AUTO: NEGATIVE
RSV RNA SPEC QL NAA+PROBE: NEGATIVE
SARS-COV-2 RNA RESP QL NAA+PROBE: NOTDETECTED
SODIUM SERPL-SCNC: 138 MMOL/L (ref 135–145)
SP GR UR STRIP.AUTO: 1.02
SPECIMEN SOURCE: NORMAL
TROPONIN T SERPL-MCNC: <6 NG/L (ref 6–19)
TROPONIN T SERPL-MCNC: <6 NG/L (ref 6–19)
UROBILINOGEN UR STRIP.AUTO-MCNC: 0.2 MG/DL
WBC # BLD AUTO: 15.3 K/UL (ref 4.8–10.8)
WBC #/AREA URNS HPF: NORMAL /HPF

## 2023-02-18 PROCEDURE — 160041 HCHG SURGERY MINUTES - EA ADDL 1 MIN LEVEL 4: Performed by: SURGERY

## 2023-02-18 PROCEDURE — G0378 HOSPITAL OBSERVATION PER HR: HCPCS

## 2023-02-18 PROCEDURE — 88304 TISSUE EXAM BY PATHOLOGIST: CPT

## 2023-02-18 PROCEDURE — 700111 HCHG RX REV CODE 636 W/ 250 OVERRIDE (IP): Performed by: PHYSICIAN ASSISTANT

## 2023-02-18 PROCEDURE — 36415 COLL VENOUS BLD VENIPUNCTURE: CPT

## 2023-02-18 PROCEDURE — 81001 URINALYSIS AUTO W/SCOPE: CPT

## 2023-02-18 PROCEDURE — 700102 HCHG RX REV CODE 250 W/ 637 OVERRIDE(OP): Performed by: EMERGENCY MEDICINE

## 2023-02-18 PROCEDURE — 700111 HCHG RX REV CODE 636 W/ 250 OVERRIDE (IP): Performed by: ANESTHESIOLOGY

## 2023-02-18 PROCEDURE — 99214 OFFICE O/P EST MOD 30 MIN: CPT | Mod: 57 | Performed by: SURGERY

## 2023-02-18 PROCEDURE — 93005 ELECTROCARDIOGRAM TRACING: CPT

## 2023-02-18 PROCEDURE — 700105 HCHG RX REV CODE 258: Performed by: SURGERY

## 2023-02-18 PROCEDURE — 160002 HCHG RECOVERY MINUTES (STAT): Performed by: SURGERY

## 2023-02-18 PROCEDURE — 93005 ELECTROCARDIOGRAM TRACING: CPT | Performed by: EMERGENCY MEDICINE

## 2023-02-18 PROCEDURE — 80053 COMPREHEN METABOLIC PANEL: CPT

## 2023-02-18 PROCEDURE — 700101 HCHG RX REV CODE 250: Performed by: ANESTHESIOLOGY

## 2023-02-18 PROCEDURE — 700102 HCHG RX REV CODE 250 W/ 637 OVERRIDE(OP): Performed by: PHYSICIAN ASSISTANT

## 2023-02-18 PROCEDURE — 160036 HCHG PACU - EA ADDL 30 MINS PHASE I: Performed by: SURGERY

## 2023-02-18 PROCEDURE — 700105 HCHG RX REV CODE 258: Performed by: EMERGENCY MEDICINE

## 2023-02-18 PROCEDURE — 99285 EMERGENCY DEPT VISIT HI MDM: CPT

## 2023-02-18 PROCEDURE — 94760 N-INVAS EAR/PLS OXIMETRY 1: CPT

## 2023-02-18 PROCEDURE — 85025 COMPLETE CBC W/AUTO DIFF WBC: CPT

## 2023-02-18 PROCEDURE — 160048 HCHG OR STATISTICAL LEVEL 1-5: Performed by: SURGERY

## 2023-02-18 PROCEDURE — 160009 HCHG ANES TIME/MIN: Performed by: SURGERY

## 2023-02-18 PROCEDURE — 76705 ECHO EXAM OF ABDOMEN: CPT

## 2023-02-18 PROCEDURE — 94669 MECHANICAL CHEST WALL OSCILL: CPT

## 2023-02-18 PROCEDURE — 71045 X-RAY EXAM CHEST 1 VIEW: CPT

## 2023-02-18 PROCEDURE — 700101 HCHG RX REV CODE 250: Performed by: SURGERY

## 2023-02-18 PROCEDURE — A9270 NON-COVERED ITEM OR SERVICE: HCPCS | Performed by: ANESTHESIOLOGY

## 2023-02-18 PROCEDURE — 96374 THER/PROPH/DIAG INJ IV PUSH: CPT

## 2023-02-18 PROCEDURE — 700117 HCHG RX CONTRAST REV CODE 255: Performed by: EMERGENCY MEDICINE

## 2023-02-18 PROCEDURE — 47562 LAPAROSCOPIC CHOLECYSTECTOMY: CPT | Performed by: SURGERY

## 2023-02-18 PROCEDURE — 160035 HCHG PACU - 1ST 60 MINS PHASE I: Performed by: SURGERY

## 2023-02-18 PROCEDURE — 84484 ASSAY OF TROPONIN QUANT: CPT

## 2023-02-18 PROCEDURE — 84703 CHORIONIC GONADOTROPIN ASSAY: CPT

## 2023-02-18 PROCEDURE — 160029 HCHG SURGERY MINUTES - 1ST 30 MINS LEVEL 4: Performed by: SURGERY

## 2023-02-18 PROCEDURE — 85379 FIBRIN DEGRADATION QUANT: CPT

## 2023-02-18 PROCEDURE — 74175 CTA ABDOMEN W/CONTRAST: CPT

## 2023-02-18 PROCEDURE — 0241U HCHG SARS-COV-2 COVID-19 NFCT DS RESP RNA 4 TRGT MIC: CPT

## 2023-02-18 PROCEDURE — 83690 ASSAY OF LIPASE: CPT

## 2023-02-18 PROCEDURE — 700111 HCHG RX REV CODE 636 W/ 250 OVERRIDE (IP): Performed by: EMERGENCY MEDICINE

## 2023-02-18 PROCEDURE — 00790 ANES IPER UPR ABD NOS: CPT | Performed by: ANESTHESIOLOGY

## 2023-02-18 PROCEDURE — 700102 HCHG RX REV CODE 250 W/ 637 OVERRIDE(OP): Performed by: ANESTHESIOLOGY

## 2023-02-18 PROCEDURE — A9270 NON-COVERED ITEM OR SERVICE: HCPCS | Performed by: PHYSICIAN ASSISTANT

## 2023-02-18 PROCEDURE — C9803 HOPD COVID-19 SPEC COLLECT: HCPCS | Performed by: EMERGENCY MEDICINE

## 2023-02-18 PROCEDURE — A9270 NON-COVERED ITEM OR SERVICE: HCPCS | Performed by: EMERGENCY MEDICINE

## 2023-02-18 PROCEDURE — 96376 TX/PRO/DX INJ SAME DRUG ADON: CPT

## 2023-02-18 PROCEDURE — 96375 TX/PRO/DX INJ NEW DRUG ADDON: CPT

## 2023-02-18 RX ORDER — ENOXAPARIN SODIUM 100 MG/ML
40 INJECTION SUBCUTANEOUS DAILY
Status: DISCONTINUED | OUTPATIENT
Start: 2023-02-19 | End: 2023-02-19 | Stop reason: HOSPADM

## 2023-02-18 RX ORDER — OXYCODONE HYDROCHLORIDE 5 MG/1
10 TABLET ORAL
Status: DISCONTINUED | OUTPATIENT
Start: 2023-02-18 | End: 2023-02-18

## 2023-02-18 RX ORDER — DEXAMETHASONE SODIUM PHOSPHATE 4 MG/ML
INJECTION, SOLUTION INTRA-ARTICULAR; INTRALESIONAL; INTRAMUSCULAR; INTRAVENOUS; SOFT TISSUE PRN
Status: DISCONTINUED | OUTPATIENT
Start: 2023-02-18 | End: 2023-02-18 | Stop reason: SURG

## 2023-02-18 RX ORDER — DOCUSATE SODIUM 100 MG/1
100 CAPSULE, LIQUID FILLED ORAL 2 TIMES DAILY
Status: DISCONTINUED | OUTPATIENT
Start: 2023-02-18 | End: 2023-02-19 | Stop reason: HOSPADM

## 2023-02-18 RX ORDER — AMOXICILLIN 250 MG
1 CAPSULE ORAL NIGHTLY
Status: DISCONTINUED | OUTPATIENT
Start: 2023-02-18 | End: 2023-02-19 | Stop reason: HOSPADM

## 2023-02-18 RX ORDER — OXYCODONE HYDROCHLORIDE 10 MG/1
10 TABLET ORAL EVERY 4 HOURS PRN
Status: DISCONTINUED | OUTPATIENT
Start: 2023-02-18 | End: 2023-02-19 | Stop reason: HOSPADM

## 2023-02-18 RX ORDER — CEFAZOLIN SODIUM 1 G/3ML
INJECTION, POWDER, FOR SOLUTION INTRAMUSCULAR; INTRAVENOUS PRN
Status: DISCONTINUED | OUTPATIENT
Start: 2023-02-18 | End: 2023-02-18 | Stop reason: SURG

## 2023-02-18 RX ORDER — ONDANSETRON 2 MG/ML
4 INJECTION INTRAMUSCULAR; INTRAVENOUS ONCE
Status: COMPLETED | OUTPATIENT
Start: 2023-02-18 | End: 2023-02-18

## 2023-02-18 RX ORDER — AMOXICILLIN 250 MG
1 CAPSULE ORAL
Status: DISCONTINUED | OUTPATIENT
Start: 2023-02-18 | End: 2023-02-19 | Stop reason: HOSPADM

## 2023-02-18 RX ORDER — DIPHENHYDRAMINE HYDROCHLORIDE 50 MG/ML
12.5 INJECTION INTRAMUSCULAR; INTRAVENOUS
Status: DISCONTINUED | OUTPATIENT
Start: 2023-02-18 | End: 2023-02-18 | Stop reason: HOSPADM

## 2023-02-18 RX ORDER — ONDANSETRON 2 MG/ML
4 INJECTION INTRAMUSCULAR; INTRAVENOUS
Status: DISCONTINUED | OUTPATIENT
Start: 2023-02-18 | End: 2023-02-18 | Stop reason: HOSPADM

## 2023-02-18 RX ORDER — OXYCODONE HYDROCHLORIDE 5 MG/1
5 TABLET ORAL
Status: DISCONTINUED | OUTPATIENT
Start: 2023-02-18 | End: 2023-02-18

## 2023-02-18 RX ORDER — BUPIVACAINE HYDROCHLORIDE AND EPINEPHRINE 5; 5 MG/ML; UG/ML
INJECTION, SOLUTION EPIDURAL; INTRACAUDAL; PERINEURAL
Status: DISCONTINUED | OUTPATIENT
Start: 2023-02-18 | End: 2023-02-18 | Stop reason: HOSPADM

## 2023-02-18 RX ORDER — METOCLOPRAMIDE HYDROCHLORIDE 5 MG/ML
INJECTION INTRAMUSCULAR; INTRAVENOUS PRN
Status: DISCONTINUED | OUTPATIENT
Start: 2023-02-18 | End: 2023-02-18 | Stop reason: SURG

## 2023-02-18 RX ORDER — ENEMA 19; 7 G/133ML; G/133ML
1 ENEMA RECTAL
Status: DISCONTINUED | OUTPATIENT
Start: 2023-02-18 | End: 2023-02-19 | Stop reason: HOSPADM

## 2023-02-18 RX ORDER — HYDROMORPHONE HYDROCHLORIDE 1 MG/ML
0.5 INJECTION, SOLUTION INTRAMUSCULAR; INTRAVENOUS; SUBCUTANEOUS EVERY 4 HOURS PRN
Status: DISCONTINUED | OUTPATIENT
Start: 2023-02-18 | End: 2023-02-19 | Stop reason: HOSPADM

## 2023-02-18 RX ORDER — OXYCODONE HYDROCHLORIDE 5 MG/1
5 TABLET ORAL EVERY 4 HOURS PRN
Status: DISCONTINUED | OUTPATIENT
Start: 2023-02-18 | End: 2023-02-19 | Stop reason: HOSPADM

## 2023-02-18 RX ORDER — ONDANSETRON 2 MG/ML
4 INJECTION INTRAMUSCULAR; INTRAVENOUS EVERY 4 HOURS PRN
Status: DISCONTINUED | OUTPATIENT
Start: 2023-02-18 | End: 2023-02-19 | Stop reason: HOSPADM

## 2023-02-18 RX ORDER — HYDROMORPHONE HYDROCHLORIDE 1 MG/ML
0.1 INJECTION, SOLUTION INTRAMUSCULAR; INTRAVENOUS; SUBCUTANEOUS
Status: DISCONTINUED | OUTPATIENT
Start: 2023-02-18 | End: 2023-02-18 | Stop reason: HOSPADM

## 2023-02-18 RX ORDER — HALOPERIDOL 5 MG/ML
1 INJECTION INTRAMUSCULAR
Status: DISCONTINUED | OUTPATIENT
Start: 2023-02-18 | End: 2023-02-18 | Stop reason: HOSPADM

## 2023-02-18 RX ORDER — HYDROMORPHONE HYDROCHLORIDE 1 MG/ML
0.4 INJECTION, SOLUTION INTRAMUSCULAR; INTRAVENOUS; SUBCUTANEOUS
Status: DISCONTINUED | OUTPATIENT
Start: 2023-02-18 | End: 2023-02-18 | Stop reason: HOSPADM

## 2023-02-18 RX ORDER — ONDANSETRON 4 MG/1
4 TABLET, ORALLY DISINTEGRATING ORAL EVERY 4 HOURS PRN
Status: DISCONTINUED | OUTPATIENT
Start: 2023-02-18 | End: 2023-02-19 | Stop reason: HOSPADM

## 2023-02-18 RX ORDER — POLYETHYLENE GLYCOL 3350 17 G/17G
1 POWDER, FOR SOLUTION ORAL 2 TIMES DAILY
Status: DISCONTINUED | OUTPATIENT
Start: 2023-02-18 | End: 2023-02-19 | Stop reason: HOSPADM

## 2023-02-18 RX ORDER — ACETAMINOPHEN 500 MG
1000 TABLET ORAL EVERY 6 HOURS PRN
Status: DISCONTINUED | OUTPATIENT
Start: 2023-02-23 | End: 2023-02-19 | Stop reason: HOSPADM

## 2023-02-18 RX ORDER — DIPHENHYDRAMINE HCL 25 MG
25 TABLET ORAL NIGHTLY PRN
COMMUNITY

## 2023-02-18 RX ORDER — CEFTRIAXONE 2 G/1
2000 INJECTION, POWDER, FOR SOLUTION INTRAMUSCULAR; INTRAVENOUS ONCE
Status: COMPLETED | OUTPATIENT
Start: 2023-02-18 | End: 2023-02-18

## 2023-02-18 RX ORDER — BISACODYL 10 MG
10 SUPPOSITORY, RECTAL RECTAL
Status: DISCONTINUED | OUTPATIENT
Start: 2023-02-18 | End: 2023-02-19 | Stop reason: HOSPADM

## 2023-02-18 RX ORDER — HYDROMORPHONE HYDROCHLORIDE 1 MG/ML
0.5 INJECTION, SOLUTION INTRAMUSCULAR; INTRAVENOUS; SUBCUTANEOUS
Status: DISCONTINUED | OUTPATIENT
Start: 2023-02-18 | End: 2023-02-18

## 2023-02-18 RX ORDER — MORPHINE SULFATE 4 MG/ML
4 INJECTION INTRAVENOUS ONCE
Status: COMPLETED | OUTPATIENT
Start: 2023-02-18 | End: 2023-02-18

## 2023-02-18 RX ORDER — ACETAMINOPHEN 500 MG
1000 TABLET ORAL EVERY 6 HOURS
Status: DISCONTINUED | OUTPATIENT
Start: 2023-02-18 | End: 2023-02-19 | Stop reason: HOSPADM

## 2023-02-18 RX ORDER — SODIUM CHLORIDE, SODIUM LACTATE, POTASSIUM CHLORIDE, CALCIUM CHLORIDE 600; 310; 30; 20 MG/100ML; MG/100ML; MG/100ML; MG/100ML
INJECTION, SOLUTION INTRAVENOUS CONTINUOUS
Status: DISCONTINUED | OUTPATIENT
Start: 2023-02-18 | End: 2023-02-19 | Stop reason: HOSPADM

## 2023-02-18 RX ORDER — ONDANSETRON 2 MG/ML
INJECTION INTRAMUSCULAR; INTRAVENOUS PRN
Status: DISCONTINUED | OUTPATIENT
Start: 2023-02-18 | End: 2023-02-18 | Stop reason: SURG

## 2023-02-18 RX ORDER — IBUPROFEN 200 MG
600-800 TABLET ORAL 2 TIMES DAILY PRN
Status: SHIPPED | COMMUNITY
End: 2023-04-21

## 2023-02-18 RX ORDER — KETOROLAC TROMETHAMINE 30 MG/ML
INJECTION, SOLUTION INTRAMUSCULAR; INTRAVENOUS PRN
Status: DISCONTINUED | OUTPATIENT
Start: 2023-02-18 | End: 2023-02-18 | Stop reason: SURG

## 2023-02-18 RX ORDER — MORPHINE SULFATE 4 MG/ML
4 INJECTION INTRAVENOUS
Status: DISCONTINUED | OUTPATIENT
Start: 2023-02-18 | End: 2023-02-18

## 2023-02-18 RX ORDER — HYDROMORPHONE HYDROCHLORIDE 1 MG/ML
0.2 INJECTION, SOLUTION INTRAMUSCULAR; INTRAVENOUS; SUBCUTANEOUS
Status: DISCONTINUED | OUTPATIENT
Start: 2023-02-18 | End: 2023-02-18 | Stop reason: HOSPADM

## 2023-02-18 RX ADMIN — DOCUSATE SODIUM 100 MG: 100 CAPSULE, LIQUID FILLED ORAL at 21:05

## 2023-02-18 RX ADMIN — SODIUM CHLORIDE, POTASSIUM CHLORIDE, SODIUM LACTATE AND CALCIUM CHLORIDE: 600; 310; 30; 20 INJECTION, SOLUTION INTRAVENOUS at 12:17

## 2023-02-18 RX ADMIN — ONDANSETRON 4 MG: 2 INJECTION INTRAMUSCULAR; INTRAVENOUS at 08:08

## 2023-02-18 RX ADMIN — IOHEXOL 100 ML: 350 INJECTION, SOLUTION INTRAVENOUS at 11:09

## 2023-02-18 RX ADMIN — CEFAZOLIN 2 G: 330 INJECTION, POWDER, FOR SOLUTION INTRAMUSCULAR; INTRAVENOUS at 18:23

## 2023-02-18 RX ADMIN — FENTANYL CITRATE 25 MCG: 50 INJECTION, SOLUTION INTRAMUSCULAR; INTRAVENOUS at 19:55

## 2023-02-18 RX ADMIN — MORPHINE SULFATE 4 MG: 4 INJECTION INTRAVENOUS at 09:15

## 2023-02-18 RX ADMIN — SODIUM CHLORIDE, POTASSIUM CHLORIDE, SODIUM LACTATE AND CALCIUM CHLORIDE: 600; 310; 30; 20 INJECTION, SOLUTION INTRAVENOUS at 18:20

## 2023-02-18 RX ADMIN — SUGAMMADEX 200 MG: 100 INJECTION, SOLUTION INTRAVENOUS at 19:13

## 2023-02-18 RX ADMIN — ROCURONIUM BROMIDE 50 MG: 10 INJECTION, SOLUTION INTRAVENOUS at 18:23

## 2023-02-18 RX ADMIN — ONDANSETRON 4 MG: 2 INJECTION INTRAMUSCULAR; INTRAVENOUS at 19:13

## 2023-02-18 RX ADMIN — FENTANYL CITRATE 50 MCG: 50 INJECTION, SOLUTION INTRAMUSCULAR; INTRAVENOUS at 19:00

## 2023-02-18 RX ADMIN — ACETAMINOPHEN 1000 MG: 325 TABLET, FILM COATED ORAL at 23:12

## 2023-02-18 RX ADMIN — MORPHINE SULFATE 4 MG: 4 INJECTION INTRAVENOUS at 08:08

## 2023-02-18 RX ADMIN — HYDROCODONE BITARTRATE AND ACETAMINOPHEN 7.5 MG: 7.5; 325 SOLUTION ORAL at 19:49

## 2023-02-18 RX ADMIN — SENNOSIDES AND DOCUSATE SODIUM 1 TABLET: 50; 8.6 TABLET ORAL at 21:06

## 2023-02-18 RX ADMIN — DEXAMETHASONE SODIUM PHOSPHATE 4 MG: 4 INJECTION, SOLUTION INTRA-ARTICULAR; INTRALESIONAL; INTRAMUSCULAR; INTRAVENOUS; SOFT TISSUE at 19:13

## 2023-02-18 RX ADMIN — ACETAMINOPHEN 1000 MG: 325 TABLET, FILM COATED ORAL at 17:14

## 2023-02-18 RX ADMIN — FAMOTIDINE 20 MG: 10 INJECTION, SOLUTION INTRAVENOUS at 09:16

## 2023-02-18 RX ADMIN — METOCLOPRAMIDE 10 MG: 5 INJECTION, SOLUTION INTRAMUSCULAR; INTRAVENOUS at 18:36

## 2023-02-18 RX ADMIN — FENTANYL CITRATE 50 MCG: 50 INJECTION, SOLUTION INTRAMUSCULAR; INTRAVENOUS at 20:10

## 2023-02-18 RX ADMIN — PROPOFOL 300 MG: 10 INJECTION, EMULSION INTRAVENOUS at 18:23

## 2023-02-18 RX ADMIN — KETOROLAC TROMETHAMINE 30 MG: 30 INJECTION, SOLUTION INTRAMUSCULAR at 19:00

## 2023-02-18 RX ADMIN — CEFTRIAXONE SODIUM 2000 MG: 2 INJECTION, POWDER, FOR SOLUTION INTRAMUSCULAR; INTRAVENOUS at 12:13

## 2023-02-18 RX ADMIN — FENTANYL CITRATE 100 MCG: 50 INJECTION, SOLUTION INTRAMUSCULAR; INTRAVENOUS at 18:26

## 2023-02-18 RX ADMIN — FENTANYL CITRATE 25 MCG: 50 INJECTION, SOLUTION INTRAMUSCULAR; INTRAVENOUS at 19:49

## 2023-02-18 RX ADMIN — ONDANSETRON 4 MG: 2 INJECTION INTRAMUSCULAR; INTRAVENOUS at 14:56

## 2023-02-18 RX ADMIN — FENTANYL CITRATE 100 MCG: 50 INJECTION, SOLUTION INTRAMUSCULAR; INTRAVENOUS at 18:33

## 2023-02-18 RX ADMIN — LIDOCAINE HYDROCHLORIDE 30 ML: 20 SOLUTION OROPHARYNGEAL at 09:15

## 2023-02-18 RX ADMIN — MORPHINE SULFATE 4 MG: 4 INJECTION INTRAVENOUS at 13:03

## 2023-02-18 RX ADMIN — HYDROMORPHONE HYDROCHLORIDE 0.5 MG: 1 INJECTION, SOLUTION INTRAMUSCULAR; INTRAVENOUS; SUBCUTANEOUS at 14:56

## 2023-02-18 ASSESSMENT — PATIENT HEALTH QUESTIONNAIRE - PHQ9
SUM OF ALL RESPONSES TO PHQ9 QUESTIONS 1 AND 2: 0
1. LITTLE INTEREST OR PLEASURE IN DOING THINGS: NOT AT ALL
2. FEELING DOWN, DEPRESSED, IRRITABLE, OR HOPELESS: NOT AT ALL

## 2023-02-18 ASSESSMENT — LIFESTYLE VARIABLES
AVERAGE NUMBER OF DAYS PER WEEK YOU HAVE A DRINK CONTAINING ALCOHOL: 23
ALCOHOL_USE: YES
HAVE YOU EVER FELT YOU SHOULD CUT DOWN ON YOUR DRINKING: YES
ANXIETY: NO ANXIETY (AT EASE)
VISUAL DISTURBANCES: NOT PRESENT
EVER FELT BAD OR GUILTY ABOUT YOUR DRINKING: YES
TOTAL SCORE: 3
HOW MANY TIMES IN THE PAST YEAR HAVE YOU HAD 5 OR MORE DRINKS IN A DAY: 3
CONSUMPTION TOTAL: POSITIVE
HEADACHE, FULLNESS IN HEAD: NOT PRESENT
HAVE PEOPLE ANNOYED YOU BY CRITICIZING YOUR DRINKING: YES
TOTAL SCORE: 0
PAROXYSMAL SWEATS: NO SWEAT VISIBLE
TOTAL SCORE: 3
ORIENTATION AND CLOUDING OF SENSORIUM: ORIENTED AND CAN DO SERIAL ADDITIONS
EVER HAD A DRINK FIRST THING IN THE MORNING TO STEADY YOUR NERVES TO GET RID OF A HANGOVER: NO
TREMOR: NO TREMOR
DOES PATIENT WANT TO STOP DRINKING: NO
AGITATION: NORMAL ACTIVITY
TOTAL SCORE: 3
NAUSEA AND VOMITING: NO NAUSEA AND NO VOMITING
AUDITORY DISTURBANCES: NOT PRESENT
ON A TYPICAL DAY WHEN YOU DRINK ALCOHOL HOW MANY DRINKS DO YOU HAVE: 0

## 2023-02-18 ASSESSMENT — COPD QUESTIONNAIRES
DO YOU EVER COUGH UP ANY MUCUS OR PHLEGM?: YES, EVERY DAY
HAVE YOU SMOKED AT LEAST 100 CIGARETTES IN YOUR ENTIRE LIFE: YES
COPD SCREENING SCORE: 4
DURING THE PAST 4 WEEKS HOW MUCH DID YOU FEEL SHORT OF BREATH: NONE/LITTLE OF THE TIME

## 2023-02-18 ASSESSMENT — PAIN DESCRIPTION - PAIN TYPE
TYPE: ACUTE PAIN
TYPE: SURGICAL PAIN
TYPE: ACUTE PAIN

## 2023-02-18 ASSESSMENT — FIBROSIS 4 INDEX: FIB4 SCORE: 0.43

## 2023-02-18 NOTE — ASSESSMENT & PLAN NOTE
CT scan with gallbladder shows stones and minimal adjacent edema concerning for cholecystitis.  2/18 Plan for Lap Cholecystectomy.  ACS Joy Service.

## 2023-02-18 NOTE — H&P
Surgical History and Physical    Date of Service: 2/18/2023    Requesting Physician: Zaki Mayorga MD - ER    Reason for Consultation: Acute Cholecystitis    HPI: This is a 33 y.o. female who is presenting with ongoing epigastric pain radiating to her back.  She underwent CTA imaging which did not reveal a dissection, but did show some mild inflammation of her gallbladder.  This was followed up with an ultrasound which showed findings consistent with cholecystitis.  Normal caliber common bile duct.    The patient was evaluated at bedside.  Her mother is present.  She reports ongoing pain.  Denies fevers, chills, nausea, and emesis.      PAST MEDICAL HISTORY:   Past Medical History:   Diagnosis Date    Alcohol abuse     Anxiety     Depression     vibyrd and effexor didnt work that well.    History of psychiatric symptoms     Hyperlipidemia     Psychosis (HCC)     Self-injurious behavior     Substance abuse (HCC)          PAST SURGICAL HISTORY:   Past Surgical History:   Procedure Laterality Date    TONSILLECTOMY AND ADENOIDECTOMY            ALLERGIES: Zoloft [sertraline]       CURRENT MEDICATIONS:   Outpatient Medications Marked as Taking for the 2/18/23 encounter (Hospital Encounter)   Medication Sig    ibuprofen (MOTRIN) 200 MG Tab Take 600-800 mg by mouth 2 times a day as needed for Mild Pain or Moderate Pain. 3 to 4 tablets = 600 to 800 mg.    diphenhydrAMINE (BENADRYL) 25 MG Tab Take 25 mg by mouth at bedtime as needed for Sleep.         FAMILY HISTORY: family history includes Arthritis in her paternal grandfather; Cancer in her maternal grandmother and paternal grandmother; Dementia in her maternal grandmother and paternal grandmother; Genetic Disorder in her mother; Heart Disease (age of onset: 35) in her father; Hyperlipidemia in her father; Hypertension in her father; Psychiatric Illness in her maternal grandfather and maternal grandmother.      SOCIAL HISTORY:  reports that she has been smoking cigarettes.  "She has never used smokeless tobacco. She reports current alcohol use. She reports that she does not currently use drugs after having used the following drugs: Marijuana.      Review of Systems:  Constitutional: Negative for fever, chills, weight loss, malaise/fatigue and diaphoresis.   HENT: Negative for hearing loss, ear pain, nosebleeds, congestion, sore throat, neck pain, tinnitus and ear discharge.    Eyes: Negative for blurred vision, double vision, photophobia, pain, discharge and redness.   Respiratory: Negative for cough, hemoptysis, sputum production, shortness of breath, wheezing and stridor.    Cardiovascular: Negative for chest pain, palpitations, orthopnea, claudication, leg swelling and PND.   Gastrointestinal: Negative for heartburn, nausea, vomiting, abdominal pain, diarrhea, constipation, blood in stool and melena.   Genitourinary: Negative for dysuria, urgency, frequency, hematuria and flank pain.   Musculoskeletal: Negative for myalgias, back pain, joint pain and falls.   Skin: Negative for itching and rash.  Neurological: Negative for dizziness, tingling, tremors, sensory change, speech change, focal weakness, seizures, loss of consciousness, weakness and headaches.   Endo/Heme/Allergies: Negative for environmental allergies and polydipsia. Does not bruise/bleed easily.   Psychiatric/Behavioral: Negative for depression, suicidal ideas, hallucinations, memory loss and substance abuse. The patient is not nervous/anxious and does not have insomnia.    Physical Exam:  BP (!) 156/87   Pulse 67   Temp 36.1 °C (97 °F) (Temporal)   Resp 18   Ht 1.727 m (5' 8\")   Wt 123 kg (270 lb 8.1 oz)   SpO2 97%   Vitals:    02/18/23 1326   BP: (!) 156/87   Pulse: 67   Resp:    Temp:    SpO2: 97%     GENERAL:  Otherwise healthy-appearing and in no acute distress  HEENT:  Atraumatic, normocephalic.  Normal pinna bilaterally.  External auditory canals are without discharge.  Conjunctivae and sclerae are clear. " Extraocular movements are full. Pupils are equal, round, and reactive to light.  Oral mucosa is moist.  NECK:  Soft and supple without lymphadenopathy. No masses are noted.  Thyroid is of normal size and texture.  Trachea is midline.  CHEST:  Lungs are clear to auscultation bilaterally.  No masses, lesions, or signs of trauma were noted.     CARDIOVASCULAR:  Regular rate and rhythm.  No murmurs appreciated.  No JVD.  Palpable pulses present in all four extremities.    ABDOMEN:  Soft, tender over the RUQ towards the epigastrum, non-distended.  Non-tympanitic.    MUSCULOSKELETAL: Normal range of motion x4 extremities.    SKIN:  Warm and well perfused. No rashes.  NEUROLOGIC:  Alert and oriented. Cranial nerves II through XII are grossly intact. Motor and sensory exams are normal in all four extremities. Motor and sensory reflexes are 2+ and symmetric with bilateral plantar responses.  PSYCHIATRIC: Affect and mood is appropriate for age and condition.    Labs:  Recent Labs     02/18/23  0627   WBC 15.3*   RBC 4.89   HEMOGLOBIN 14.7   HEMATOCRIT 41.9   MCV 85.7   MCH 30.1   MCHC 35.1*   RDW 39.5   PLATELETCT 356   MPV 9.8     Recent Labs     02/18/23  0627   SODIUM 138   POTASSIUM 3.8   CHLORIDE 102   CO2 23   GLUCOSE 107*   BUN 10   CREATININE 0.63   CALCIUM 9.9         Recent Labs     02/18/23  0627   ASTSGOT 24   ALTSGPT 28   TBILIRUBIN 0.2   ALKPHOSPHAT 91   GLOBULIN 3.7*       Radiology:  CT-CTA COMPLETE THORACOABDOMINAL AORTA   Final Result      1.  No thoracic or abdominal aortic aneurysm or dissection.   2.  No gross pulmonary emboli.   3.  Hepatomegaly, nonspecific.   4.  Gallbladder shows stones and minimal adjacent edema concerning for cholecystitis.                        US-RUQ   Final Result      1.  Echogenic liver suggesting fatty infiltration or fibrosis.   2.  Cholelithiasis   3.  Gallbladder wall thickening, which may be in part due to partially contracted state however cholecystitis is a  consideration.   4.  No significant biliary dilation.   5.  Limited evaluation of pancreas.      DX-CHEST-PORTABLE (1 VIEW)   Final Result      No acute cardiopulmonary disease.          Assessment/Plan:   * Cholecystitis- (present on admission)  Assessment & Plan  CT scan with gallbladder shows stones and minimal adjacent edema concerning for cholecystitis.  2/18 Plan for Lap Cholecystectomy.  ACS Gray Service.      Given the above presentation, the patient will be taken to the operating room for laparoscopic cholecystectomy. The surgical plan rationale for surgery was discussed in detail and in layman's terms with the patient and available family. Potential complications were discussed in detail and include but are not limited to infection, bleeding, damage to adjacent tissues and organs, pneumonia, anesthetic complications and death on very rare occasions. Questions and concerns were addressed to the patient's and available family's apparent satisfaction.  It was agreed to proceed.  Operative consent was obtained.      Aggregated care time spent evaluating, reassessing, reviewing documentation, providing care, and managing this patient exclusive of procedures: 50 minutes  ____________________________________   Prabhakar Osborne MD, FACS   JRU / NTS     DD: 2/18/2023   DT: 2:38 PM

## 2023-02-18 NOTE — ED NOTES
Med rec completed per patient with mother at bedside.  Allergies reviewed with patient.  No outpatient antibiotics within the last 30 days.  Patient's preferred pharmacy: [facility] on [address].

## 2023-02-18 NOTE — ED PROVIDER NOTES
"ED Provider Note    CHIEF COMPLAINT  Chief Complaint   Patient presents with    Back Pain     Mid back started 2/16    Chest Pain     In the middle of the sternum, started 2/17       HPI/ROS    Ryanne Amy Story is a 33 y.o. female who presents to the emergency department complaining of mid back and chest pain.  The patient states the pain started 2 days ago with a sometimes severe pain in her mid to upper back.  She has had the back pain for about 2 days but last night it started to feel like the pain was radiating forward to her lower chest and epigastric area.  It seems to come in waves.  Her last oral intake was 8 PM last night and she does remember that pain got worse after eating last night.  Pain is moderate in intensity at the time of arrival.    Review of systems: No shortness of breath no hemoptysis no black or bloody stool or emesis    PAST MEDICAL HISTORY   has a past medical history of Alcohol abuse, Anxiety, Depression, History of psychiatric symptoms, Hyperlipidemia, Morbid obesity with BMI of 40.0-44.9, adult (HCC) (1/16/2017), Psychosis ([facility]), Self-injurious behavior, and Substance abuse ([facility]).    SURGICAL HISTORY   has a past surgical history that includes tonsillectomy and adenoidectomy.    FAMILY HISTORY  Family History   Problem Relation Age of Onset    Genetic Disorder Mother         Overweight    Heart Disease Father 35        MI    Hypertension Father     Hyperlipidemia Father     Psychiatric Illness Maternal Grandmother         Alzheimer    Cancer Maternal Grandmother         Breast    Dementia Maternal Grandmother     Psychiatric Illness Maternal Grandfather         Alzheimers vs \"crazy\"    Cancer Paternal Grandmother         Breast    Dementia Paternal Grandmother     Arthritis Paternal Grandfather         gout    Alcohol abuse Neg Hx     Drug abuse Neg Hx     Schizophrenia Neg Hx     Paranoid behavior Neg Hx     Suicide Attempts Neg Hx        SOCIAL HISTORY  Social History     Tobacco " "Use    Smoking status: Every Day     Packs/day: 0.00     Types: Cigarettes    Smokeless tobacco: Never    Tobacco comments:     5 cigarettes daily   Vaping Use    Vaping Use: Former   Substance and Sexual Activity    Alcohol use: Yes     Comment: socially    Drug use: Not Currently     Types: Marijuana    Sexual activity: Never     Birth control/protection: I.U.D.     Comment: IUD mirena palced 6/2013       CURRENT MEDICATIONS  Home Medications       Reviewed by Rosy Daniels (Pharmacy Tech) on 02/18/23 at 1204  Med List Status: Complete     Medication Last Dose Status   albuterol 108 (90 Base) MCG/ACT Aero Soln inhalation aerosol \"COUPLE WEEKS AGO\" Active   carbamazepine SR (TEGRETOL XR) 200 MG TABLET SR 12 HR extended-release tablet 2/17/2023 Active   diphenhydrAMINE (BENADRYL) 25 MG Tab 2/17/2023 Active   escitalopram (LEXAPRO) 10 MG Tab 2/17/2023 Active   ibuprofen (MOTRIN) 200 MG Tab 2/17/2023 Active   lisdexamfetamine (VYVANSE) 70 MG capsule FUTURE FILL Active   lisdexamfetamine (VYVANSE) 70 MG capsule FUTURE FILL Active   lisdexamfetamine (VYVANSE) 70 MG capsule 2/12/2023 Active   spironolactone (ALDACTONE) 100 MG Tab 2/17/2023 Active   tizanidine (ZANAFLEX) 4 MG Tab 2/17/2023 Active                    ALLERGIES  Allergies   Allergen Reactions    Zoloft [Sertraline] Unspecified     Suicidal ideation       PHYSICAL EXAM  VITAL SIGNS: BP (!) 156/87   Pulse 67   Temp 36.1 °C (97 °F) (Temporal)   Resp 18   Ht 1.727 m (5' 8\")   Wt 123 kg (270 lb 8.1 oz)   SpO2 97%   BMI 41.13 kg/m²    Constitutional: Awake lucid verbal moderately uncomfortable appearing at the time of arrival  Eyes: No erythema discharge or jaundice  Neck: Trachea midline no JVD  Cardiovascular: Regular rate and rhythm  Respiratory: Clear bilaterally with no apparent difficulty breathing  Abdomen: Moderately overweight soft and nondistended she has mild discomfort with deep palpation of the epigastrium but does not have a classic " Navarro sign.  No lower abdominal tenderness or peritoneal findings  Back: No reproducible tenderness with palpation of the back or CVA percussion  Skin: Warm and dry no rashes or vesicles on the back  Musculoskeletal: No acute bony deformity  Neurologic: Awake verbal and moving all extremities      DIAGNOSTIC STUDIES / PROCEDURES  EKG  I have independently interpreted this EKG  EKG #1 obtained at the time of arrival is a twelve-lead EKG showing sinus rhythm 83 bpm no pathologic ST elevation or depression or ectopy.    EKG #2 was obtained during an exacerbation of pain in the emergency department and it is a twelve-lead EKG showing sinus rhythm 77 bpm no pathologic ST elevation or depression or ectopy    LABS  CBC shows an elevated white blood cell count of 15.3 hemoglobin is adequate at 14.7 basic metabolic panel is unremarkable LFTs and lipase were normal    RADIOLOGY  Radiologist interpretation:   CT-CTA COMPLETE THORACOABDOMINAL AORTA   Final Result      1.  No thoracic or abdominal aortic aneurysm or dissection.   2.  No gross pulmonary emboli.   3.  Hepatomegaly, nonspecific.   4.  Gallbladder shows stones and minimal adjacent edema concerning for cholecystitis.                        US-RUQ   Final Result      1.  Echogenic liver suggesting fatty infiltration or fibrosis.   2.  Cholelithiasis   3.  Gallbladder wall thickening, which may be in part due to partially contracted state however cholecystitis is a consideration.   4.  No significant biliary dilation.   5.  Limited evaluation of pancreas.      DX-CHEST-PORTABLE (1 VIEW)   Final Result      No acute cardiopulmonary disease.            COURSE & MEDICAL DECISION MAKING  In the emergency department an IV was established and the patient was given intermittent doses of intravenous morphine and Zofran.  The presentation is a little bit atypical but I think that the patient's symptoms are attributable to acute cholecystitis with cholelithiasis.  I have  started her on intravenous antibiotics and I have reviewed the case with Dr. Osborne who will admit the patient and she will be going to the operating room later this evening for further evaluation and treatment.  The patient was kept n.p.o. in the emergency department and given maintenance fluids to maintain hydration.  I have reviewed all the findings and the plan of care with the patient and her family    FINAL DIAGNOSIS  1. Calculus of gallbladder with acute cholecystitis without obstruction           Electronically signed by: Zaki Mayorga M.D., 2/18/2023 2:45 PM

## 2023-02-19 VITALS
SYSTOLIC BLOOD PRESSURE: 109 MMHG | HEART RATE: 67 BPM | TEMPERATURE: 97.1 F | WEIGHT: 268.96 LBS | RESPIRATION RATE: 18 BRPM | DIASTOLIC BLOOD PRESSURE: 59 MMHG | BODY MASS INDEX: 40.76 KG/M2 | OXYGEN SATURATION: 95 % | HEIGHT: 68 IN

## 2023-02-19 PROCEDURE — G0378 HOSPITAL OBSERVATION PER HR: HCPCS

## 2023-02-19 PROCEDURE — 99024 POSTOP FOLLOW-UP VISIT: CPT | Performed by: PHYSICIAN ASSISTANT

## 2023-02-19 PROCEDURE — 700102 HCHG RX REV CODE 250 W/ 637 OVERRIDE(OP): Performed by: PHYSICIAN ASSISTANT

## 2023-02-19 PROCEDURE — A9270 NON-COVERED ITEM OR SERVICE: HCPCS | Performed by: PHYSICIAN ASSISTANT

## 2023-02-19 PROCEDURE — 700105 HCHG RX REV CODE 258: Performed by: SURGERY

## 2023-02-19 RX ORDER — PSEUDOEPHEDRINE HCL 30 MG
100 TABLET ORAL 2 TIMES DAILY PRN
Status: SHIPPED | COMMUNITY
Start: 2023-02-19 | End: 2023-04-21

## 2023-02-19 RX ORDER — ACETAMINOPHEN 500 MG
1000 TABLET ORAL EVERY 6 HOURS PRN
Qty: 30 TABLET | Refills: 0 | Status: SHIPPED | COMMUNITY
Start: 2023-02-23 | End: 2023-04-21

## 2023-02-19 RX ORDER — OXYCODONE HYDROCHLORIDE 5 MG/1
5 TABLET ORAL EVERY 6 HOURS PRN
Qty: 16 TABLET | Refills: 0 | Status: SHIPPED | OUTPATIENT
Start: 2023-02-19 | End: 2023-02-23

## 2023-02-19 RX ADMIN — DOCUSATE SODIUM 100 MG: 100 CAPSULE, LIQUID FILLED ORAL at 05:11

## 2023-02-19 RX ADMIN — ACETAMINOPHEN 1000 MG: 325 TABLET, FILM COATED ORAL at 05:11

## 2023-02-19 RX ADMIN — MAGNESIUM HYDROXIDE 30 ML: 400 SUSPENSION ORAL at 05:11

## 2023-02-19 RX ADMIN — OXYCODONE HYDROCHLORIDE 10 MG: 10 TABLET ORAL at 03:26

## 2023-02-19 RX ADMIN — POLYETHYLENE GLYCOL 3350 1 PACKET: 17 POWDER, FOR SOLUTION ORAL at 05:11

## 2023-02-19 RX ADMIN — OXYCODONE 5 MG: 5 TABLET ORAL at 10:04

## 2023-02-19 RX ADMIN — SODIUM CHLORIDE, POTASSIUM CHLORIDE, SODIUM LACTATE AND CALCIUM CHLORIDE: 600; 310; 30; 20 INJECTION, SOLUTION INTRAVENOUS at 03:27

## 2023-02-19 ASSESSMENT — PAIN DESCRIPTION - PAIN TYPE
TYPE: SURGICAL PAIN
TYPE: ACUTE PAIN
TYPE: SURGICAL PAIN

## 2023-02-19 NOTE — ANESTHESIA PROCEDURE NOTES
Airway    Date/Time: 2/18/2023 6:23 PM  Performed by: Jorgito Castellanos M.D.  Authorized by: Jorgito Castellanos M.D.     Location:  OR  Urgency:  Elective  Indications for Airway Management:  Anesthesia      Spontaneous Ventilation: absent    Sedation Level:  Deep  Preoxygenated: Yes    Patient Position:  Sniffing  Final Airway Type:  Endotracheal airway  Final Endotracheal Airway:  ETT  Cuffed: Yes    Technique Used for Successful ETT Placement:  Direct laryngoscopy    Insertion Site:  Oral  Blade Type:  Retana  Laryngoscope Blade/Videolaryngoscope Blade Size:  3  ETT Size (mm):  6.5  Measured from:  Teeth  ETT to Teeth (cm):  22  Placement Verified by: auscultation and capnometry    Cormack-Lehane Classification:  Grade I - full view of glottis  Number of Attempts at Approach:  1

## 2023-02-19 NOTE — CARE PLAN
Problem: Pain - Standard  Goal: Alleviation of pain or a reduction in pain to the patient’s comfort goal  Outcome: Progressing     Problem: Knowledge Deficit - Standard  Goal: Patient and family/care givers will demonstrate understanding of plan of care, disease process/condition, diagnostic tests and medications  Outcome: Progressing     Problem: Early Mobilization - Post Surgery  Goal: Early mobilization post surgery  Outcome: Progressing     Problem: Pain - Post Surgery  Goal: Alleviation or reduction of pain post surgery  Outcome: Progressing     Problem: Wound/ / Incision Healing  Goal: Patient's wound/surgical incision will decrease in size and heals properly  Outcome: Progressing   The patient is Stable - Low risk of patient condition declining or worsening    Shift Goals  Clinical Goals: pain mgt, D/C in am  Patient Goals: control pain  Family Goals: Pt Comfort    Progress made toward(s) clinical / shift goals:  improving    Patient is not progressing towards the following goals:

## 2023-02-19 NOTE — CARE PLAN
The patient is Stable - Low risk of patient condition declining or worsening    Shift Goals  Clinical Goals: control pain  Patient Goals: discharge  Family Goals: n/a    Progress made toward(s) clinical / shift goals:        Problem: Pain - Standard  Goal: Alleviation of pain or a reduction in pain to the patient’s comfort goal  Outcome: Progressing     Problem: Knowledge Deficit - Standard  Goal: Patient and family/care givers will demonstrate understanding of plan of care, disease process/condition, diagnostic tests and medications  Outcome: Progressing     Problem: Early Mobilization - Post Surgery  Goal: Early mobilization post surgery  Outcome: Progressing     Problem: Pain - Post Surgery  Goal: Alleviation or reduction of pain post surgery  Outcome: Progressing     Problem: Wound/ / Incision Healing  Goal: Patient's wound/surgical incision will decrease in size and heals properly  Outcome: Progressing       Patient is not progressing towards the following goals:

## 2023-02-19 NOTE — OP REPORT
DATE OF OPERATION: 2/18/2023    PREOPERATIVE DIAGNOSIS: Acute Cholecystitis    POSTOPERATIVE DIAGNOSIS: Same.    PROCEDURE PERFORMED: Laparoscopic cholecystectomy.     SURGEON: Prabhakar Osborne MD    ASSISTANT: Tierra Le PA-C.    The indications for a surgical assistant in this surgery were indicated due to complexity of the procedure. Their role included aiding in incision, retraction, holding devices including camera for laparoscopic procedure, and closure of the wound.     ANESTHESIA: General endotracheal anesthesia.    FINDINGS: The gallbladder showed signs of marked acute inflammation of all surfaces. Contained dark bile with a syrup consistency and several large faceted gallstones.    SPECIMEN: Gallbladder with contents.    ESTIMATED BLOOD LOSS: 5 mL.    PROCEDURE: Informed consent was obtained pre-operatively.  The patient was taken back to the operating room and placed in supine position.  General endotracheal anesthesia was administered and the patient was intubated. Intravenous antibiotics were administered by the anesthesiologist in correct time interval. Sequential compression devices were placed. The abdomen was prepped and draped in a sterile fashion.  A time-out was performed and the patient and procedure were both verified.      Marcaine 0.5% with epinephrine was used to infiltrate the port sites. A 5 mm maura-umbilical incision was made and subcutaneous tissue spread bluntly. The umbilical stalk was grasped with a Kocher and elevated toward the ceiling.  A Veress needle was atraumatically inserted into the peritoneal space. Saline test was performed and supported proper intra-peritoneal placement.  Carbon dioxide gas was applied to the port and pneumoperitoneum was achieved.  The Veress needle was removed after adequate insufflation.  A 5 mm port was placed into the intra-peritoneal space. A laparoscope was placed through this port.  The abdominal contents were inspected noted to be free of  injury from Veress needle and port placement.  A 12 mm and two 5 mm ports were then placed in the epigastric region, right subcostal, and right lateral locations under directed visualization, respectively.      The gallbladder was identified, elevated, and retracted cephalad over the liver edge by the fundus to expose the infundibulum. Dissection was carried out within the hepatocystic triangle, definitively identifying the cystic duct and cystic artery. The cystic duct could be seen clearly terminating at the infundibulum.  The critical view of safety was achieved.      The cystic duct and artery were each clipped once proximally, twice distally, and divided. The gallbladder was dissected free from the undersurface of the liver using electrocautery and placed within an EndoCatch bag. The gallbladder was delivered intact from the abdominal cavity through the epigastric port site and submitted for pathology. The gallbladder fossa was inspected and hemostasis was noted to be satisfactory.     The epigastric port site fascia was closed with a 0 Vicryl suture using a suture passer. Once tied down, the fascia was noted to be tight and well approximated.    The ports were opened and the abdomen was allowed to deflate. All ports were then removed.  The port site skin incisions were closed with interrupted 4-0 Vicryl subcuticular sutures.    The patient tolerated the procedure well and there were no apparent complications. All sponge, sharps, and instrument counts were correct on 2 separate occasions. The patient was awakened, extubated, and transferred to the PACU in satisfactory condition.               [facility] Post-Operative Data:    Emergency Case?----- Yes  Wound Classification?----- Contaminated  Wound Closure?----- All Layers  ASA Classification?----- III   E    ____________________________________   Prabhakar Osborne MD, FACS    JRU / NTS     DD: 2/18/2023   DT: 7:16 PM

## 2023-02-19 NOTE — DISCHARGE INSTRUCTIONS
Post Operative Discharge Instructions:    1. DIET: Upon discharge from the hospital, you may resume your normal preoperative diet, unless specifically directed otherwise. Depending on how you are feeling and whether you have nausea or not, you may wish to stay with a bland diet for the first few days. However, you can advance this as quickly as you feel ready.    2. ACTIVITIES: After discharge from the hospital, you may resume full routine activities; however, there should be no heavy lifting (greater than 20 pounds or a bag of groceries) and no strenuous activities for at least 2 weeks. The duration may be longer, depending on your surgical procedure. Routine activities of daily living are acceptable. Activity level should be addressed at your post-op follow up appointment.    3. DRIVING: You may drive whenever you are off pain medications and are able to perform the activities needed to drive, i.e., turning, bending, twisting, etc.    4. BATHING: You may get the wound wet at any time after leaving the hospital. You may shower, but do not submerge in a bath for at least two weeks.  If you have skin glue to the wound, this will fall off on its own, do not pick at it.    5. BOWEL FUNCTION:   After surgery, it is not uncommon for patients to develop either frequent or loose stools after meals. This usually corrects itself after a few days, to a few weeks. If this occurs, do not worry; this will resolve on its own.  Constipation is much more common than loose stools. The cause is the combination of pain medication and decreased activity level and possibly the nature of the surgical procedure performed. If you feel this is occurring, you may use an over-the-counter treatment such as MiraLAX (or Milk of Magnesia, Ex-Lax, Senokot, etc.) until the problem has resolved. Drink plenty of water and try to wean off narcotic pain medications as soon as is comfortable for you.    6. PAIN MEDICATION:   You will be given a  prescription for pain medication at discharge. Please take these as directed. It is important to remember not to take medications on an empty stomach as this may cause nausea.  You may also take over the counter acetaminophen and/or NSAIDS (ibuprofen, Aleve, Advil, Motrin) per the package instructions.  You may also use ice to the wound to decrease pain and swelling. You may alternate 20 minutes on and 20 minutes off with the ice for the first 24-48 hours. Make sure you place a washcloth or towel between the ice pack and your skin.  Please note that narcotic pain medication cannot be refilled unless you are seen by a doctor. Make sure you call the office if you are running low on medication or if the dose you have been prescribed is not working well for you.    7.CALL THE [facility] AT [phone] IF YOU HAVE:  (1) Fevers to more than 101F, (2) Unusual chest or leg pain, (3) Drainage or fluid from incision that may be foul smelling, increased tenderness or soreness at the wound or the wound edges are no longer together, redness or swelling at the incision site. Do not hesitate to call with any other questions.

## 2023-02-19 NOTE — PROGRESS NOTES
"  DATE: 2/19/2023    Post Operative Day  1 laparoscopic cholecystectomy.    INTERVAL EVENTS:  Pain adequately controlled.  Tolerating regular diet, passing flatus.  Mobilizing on room air.    PHYSICAL EXAMINATION:  Vital Signs: /59   Pulse 67   Temp 36.2 °C (97.1 °F) (Temporal)   Resp 18   Ht 1.727 m (5' 8\")   Wt 122 kg (268 lb 15.4 oz)   SpO2 95%     Physical Exam  Vitals and nursing note reviewed.   Abdominal:      General: Abdomen is protuberant. There is no distension.      Palpations: Abdomen is soft.      Tenderness: There is abdominal tenderness (incisional).      Comments: Incisions DCI with dermabond     LABORATORY VALUES:   Recent Labs     02/18/23  0627   WBC 15.3*   RBC 4.89   HEMOGLOBIN 14.7   HEMATOCRIT 41.9   MCV 85.7   MCH 30.1   MCHC 35.1*   RDW 39.5   PLATELETCT 356   MPV 9.8     Recent Labs     02/18/23  0627   SODIUM 138   POTASSIUM 3.8   CHLORIDE 102   CO2 23   GLUCOSE 107*   BUN 10   CREATININE 0.63   CALCIUM 9.9     Recent Labs     02/18/23  0627   ASTSGOT 24   ALTSGPT 28   TBILIRUBIN 0.2   ALKPHOSPHAT 91   GLOBULIN 3.7*            IMAGING:   CT-CTA COMPLETE THORACOABDOMINAL AORTA   Final Result      1.  No thoracic or abdominal aortic aneurysm or dissection.   2.  No gross pulmonary emboli.   3.  Hepatomegaly, nonspecific.   4.  Gallbladder shows stones and minimal adjacent edema concerning for cholecystitis.                        US-RUQ   Final Result      1.  Echogenic liver suggesting fatty infiltration or fibrosis.   2.  Cholelithiasis   3.  Gallbladder wall thickening, which may be in part due to partially contracted state however cholecystitis is a consideration.   4.  No significant biliary dilation.   5.  Limited evaluation of pancreas.      DX-CHEST-PORTABLE (1 VIEW)   Final Result      No acute cardiopulmonary disease.          ASSESSMENT AND PLAN:   * Cholecystitis- (present on admission)  Assessment & Plan  CT scan with gallbladder shows stones and minimal adjacent " edema concerning for cholecystitis.  2/18 Plan for Lap Cholecystectomy.  [facility].      - Discharge home today  - Follow up in [facility] clinic 1 week  - Rx sent to [facility] [place]       ____________________________________     Tierra Le P.A.-C.    DD: 2/19/2023  9:55 AM

## 2023-02-19 NOTE — ANESTHESIA PREPROCEDURE EVALUATION
Case: [id number] Date/Time: 02/18/23 1851    Procedure: CHOLECYSTECTOMY, LAPAROSCOPIC    [place] [address] [place]GERY [facility]    Surgeons: VINOD Pritchett H&P:  PAST MEDICAL HISTORY:   33 y.o. female who presents for Procedure(s):  CHOLECYSTECTOMY, LAPAROSCOPIC.  She has current and past medical problems significant for:    Morbid obesity  Current smoker- THC  Patient denies history of seizures or strokes  Patient denies history of diabetes or thyroid disease  Patient denies history of GERD or esophageal disease  Patient denies history of SERA  Patient denies history of previous MI, chest pain or shortness of breath  Patient denies history of renal failure  Patient denies history of upper or lower extremity motor/sensory deficits   Patient denies history of bleeding disorders  Patient denies history of complications with anesthesia    Able to climb 2 flights of stair without dyspnea or angina, > 4 METs     Past Medical History:   Diagnosis Date   • Alcohol abuse    • Anxiety    • Depression     vibyrd and effexor didnt work that well.   • History of psychiatric symptoms    • Hyperlipidemia    • Morbid obesity with BMI of 40.0-44.9, adult ([facility]) 1/16/2017   • Psychosis ([facility])    • Self-injurious behavior    • Substance abuse ([facility])        SMOKING/ALCOHOL/RECREATIONAL DRUG USE:  Social History     Tobacco Use   • Smoking status: Every Day     Packs/day: 0.00     Types: Cigarettes   • Smokeless tobacco: Never   • Tobacco comments:     5 cigarettes daily   Vaping Use   • Vaping Use: Former   Substance Use Topics   • Alcohol use: Yes     Comment: socially   • Drug use: Not Currently     Types: Marijuana     Social History     Substance and Sexual Activity   Drug Use Not Currently   • Types: Marijuana       PAST SURGICAL HISTORY:  Past Surgical History:   Procedure Laterality Date   • TONSILLECTOMY AND ADENOIDECTOMY         ALLERGIES:   Allergies   Allergen Reactions   • Zoloft [Sertraline] Unspecified      Suicidal ideation       MEDICATIONS:  No current facility-administered medications on file prior to encounter.     Current Outpatient Medications on File Prior to Encounter   Medication Sig Dispense Refill   • ibuprofen (MOTRIN) 200 MG Tab Take 600-800 mg by mouth 2 times a day as needed for Mild Pain or Moderate Pain. 3 to 4 tablets = 600 to 800 mg.     • diphenhydrAMINE (BENADRYL) 25 MG Tab Take 25 mg by mouth at bedtime as needed for Sleep.     • [START ON 3/26/2023] lisdexamfetamine (VYVANSE) 70 MG capsule Take 1 Capsule by mouth every morning for 30 days. 30 Capsule 0   • [START ON 2/25/2023] lisdexamfetamine (VYVANSE) 70 MG capsule Take 1 Capsule by mouth every morning for 30 days. 30 Capsule 0   • lisdexamfetamine (VYVANSE) 70 MG capsule Take 1 Capsule by mouth every morning for 30 days. 30 Capsule 0   • carbamazepine SR (TEGRETOL XR) 200 MG TABLET SR 12 HR extended-release tablet Take 1 Tablet by mouth 2 times a day. 180 Tablet 0   • escitalopram (LEXAPRO) 10 MG Tab Take 1 Tablet by mouth every day. 90 Tablet 0   • spironolactone (ALDACTONE) 100 MG Tab Take 1 Tablet by mouth every day. 90 Tablet 0   • albuterol 108 (90 Base) MCG/ACT Aero Soln inhalation aerosol Inhale 2 Puffs every four hours as needed for Shortness of Breath. 1 Each 0   • tizanidine (ZANAFLEX) 4 MG Tab Take 1 tablet by mouth every 6 hours as needed (back pain). 90 tablet 0       LABS:  Lab Results   Component Value Date/Time    HEMOGLOBIN 14.7 02/18/2023 0627    HEMATOCRIT 41.9 02/18/2023 0627    WBC 15.3 (H) 02/18/2023 0627     Lab Results   Component Value Date/Time    SODIUM 138 02/18/2023 0627    POTASSIUM 3.8 02/18/2023 0627    CHLORIDE 102 02/18/2023 0627    CO2 23 02/18/2023 0627    GLUCOSE 107 (H) 02/18/2023 0627    BUN 10 02/18/2023 0627    CALCIUM 9.9 02/18/2023 0627       SARS-CoV2 result: Negative      PREVIOUS ANESTHETICS: See EMR  __________________________________________    Relevant Problems   No relevant active problems        Physical Exam    Airway   Mallampati: II  TM distance: >3 FB  Neck ROM: full       Cardiovascular - normal exam  Rhythm: regular  Rate: normal  (-) murmur     Dental - normal exam           Pulmonary - normal exam  Breath sounds clear to auscultation     Abdominal   (+) obese     Neurological - normal exam                 Anesthesia Plan    ASA 3   ASA physical status 3 criteria: morbid obesity - BMI greater than or equal to 40    Plan - general       Airway plan will be ETT          Induction: intravenous    Postoperative Plan: Postoperative administration of opioids is intended.    Pertinent diagnostic labs and testing reviewed    Informed Consent:    Anesthetic plan and risks discussed with patient.    Use of blood products discussed with: patient whom consented to blood products.

## 2023-02-19 NOTE — ANESTHESIA POSTPROCEDURE EVALUATION
Patient: Ryanne Story    Procedure Summary     Date: 02/18/23 Room / Location: [place] [address] / SURGERY [facility]    Anesthesia Start: 1818 Anesthesia Stop: 1928    Procedure: CHOLECYSTECTOMY, LAPAROSCOPIC (Abdomen) Diagnosis: (Acute Cholecystitis)    Surgeons: Steve Osborne M.D. Responsible Provider: Jorgito Castellanos M.D.    Anesthesia Type: general ASA Status: 3          Final Anesthesia Type: general  Last vitals  BP   Blood Pressure: 136/64    Temp   36.5 °C (97.7 °F)    Pulse   95   Resp   20    SpO2   95 %      Anesthesia Post Evaluation    Patient location during evaluation: PACU  Patient participation: complete - patient participated  Level of consciousness: awake and alert    Airway patency: patent  Anesthetic complications: no  Cardiovascular status: hemodynamically stable  Respiratory status: acceptable  Hydration status: euvolemic    PONV: none          There were no known notable events for this encounter.     Nurse Pain Score: 6 (NPRS)

## 2023-02-19 NOTE — OR NURSING
1925: Pt arrived from OR, handoff received from anesthesiologist and RN. Patient asleep, breathing even and unlabored. Four lap sites to abdomen w/dermabond, C/D/I.     1945: Patient more awake, oriented x4. Medicated for pain per MAR, denies nausea.     2000: Patient's mom updated on status.     2030: Report given to JUAN Arceo.

## 2023-02-19 NOTE — PROGRESS NOTES
IV Dc 'd. Discharge instructions provided to patient. Pt verbalizes understanding. Pt states all questions have been answered. Copy of DC provided to patient. Signed copy in chart. Pt states all personal belongings are in possession. Pt escorted off unit by staff via w/c without incident. Parents providing transportation.

## 2023-02-19 NOTE — ANESTHESIA TIME REPORT
Anesthesia Start and Stop Event Times     Date Time Event    2/18/2023 1815 Ready for Procedure     1818 Anesthesia Start     1928 Anesthesia Stop        Responsible Staff  02/18/23    Name Role Begin End    Jorgito Castellanos M.D. Anesth 1818 1928        Overtime Reason:  no overtime (within assigned shift)    Comments:

## 2023-02-19 NOTE — ED NOTES
Pt transported on [facility] by transport staff. Pt care transferred.

## 2023-02-19 NOTE — PROGRESS NOTES
Assessment completed. Pt A&Ox4. Respirations are even and unlabored on RA. Pt denies pain at this time, pain with extreme movements. Monitors applied, VS stable, call light and belongings within reach. POC updated (discharge). Pt educated on room and call light, pt verbalized understanding. Communication board updated. Needs met.

## 2023-02-20 LAB — PATHOLOGY CONSULT NOTE: NORMAL

## 2023-04-17 ENCOUNTER — TELEPHONE (OUTPATIENT)
Dept: BEHAVIORAL HEALTH | Facility: CLINIC | Age: 33
End: 2023-04-17
Payer: COMMERCIAL

## 2023-04-21 ENCOUNTER — TELEMEDICINE (OUTPATIENT)
Dept: BEHAVIORAL HEALTH | Facility: CLINIC | Age: 33
End: 2023-04-21

## 2023-04-21 DIAGNOSIS — F33.0 MDD (MAJOR DEPRESSIVE DISORDER), RECURRENT EPISODE, MILD (HCC): ICD-10-CM

## 2023-04-21 DIAGNOSIS — F50.81 BINGE-EATING DISORDER, MILD: ICD-10-CM

## 2023-04-21 DIAGNOSIS — F10.10 ALCOHOL USE DISORDER, MILD, ABUSE: ICD-10-CM

## 2023-04-21 PROBLEM — E66.01 MORBID OBESITY WITH BMI OF 40.0-44.9, ADULT (HCC): Status: RESOLVED | Noted: 2017-01-16 | Resolved: 2023-04-21

## 2023-04-21 PROCEDURE — 99214 OFFICE O/P EST MOD 30 MIN: CPT | Mod: 95 | Performed by: PSYCHIATRY & NEUROLOGY

## 2023-04-21 PROCEDURE — 90833 PSYTX W PT W E/M 30 MIN: CPT | Mod: 95 | Performed by: PSYCHIATRY & NEUROLOGY

## 2023-04-21 RX ORDER — LISDEXAMFETAMINE DIMESYLATE CAPSULES 70 MG/1
70 CAPSULE ORAL EVERY MORNING
Qty: 30 CAPSULE | Refills: 0 | Status: SHIPPED | OUTPATIENT
Start: 2023-06-01 | End: 2023-07-01

## 2023-04-21 RX ORDER — CARBAMAZEPINE 200 MG/1
200 TABLET, EXTENDED RELEASE ORAL 2 TIMES DAILY
Qty: 180 TABLET | Refills: 0 | Status: SHIPPED | OUTPATIENT
Start: 2023-04-21 | End: 2023-07-27 | Stop reason: SDUPTHER

## 2023-04-21 RX ORDER — LISDEXAMFETAMINE DIMESYLATE CAPSULES 70 MG/1
70 CAPSULE ORAL EVERY MORNING
Qty: 30 CAPSULE | Refills: 0 | Status: SHIPPED | OUTPATIENT
Start: 2023-06-30 | End: 2023-07-30

## 2023-04-21 RX ORDER — ESCITALOPRAM OXALATE 10 MG/1
10 TABLET ORAL DAILY
Qty: 90 TABLET | Refills: 0 | Status: SHIPPED | OUTPATIENT
Start: 2023-04-21 | End: 2023-07-27 | Stop reason: SDUPTHER

## 2023-04-21 RX ORDER — LISDEXAMFETAMINE DIMESYLATE CAPSULES 70 MG/1
70 CAPSULE ORAL EVERY MORNING
Qty: 30 CAPSULE | Refills: 0 | Status: SHIPPED | OUTPATIENT
Start: 2023-05-03 | End: 2023-06-02

## 2023-04-21 NOTE — PROGRESS NOTES
PSYCHIATRY VIRTUAL VISIT FOLLOW-UP NOTE    Chief Complaint   Patient presents with    Follow-Up     Depression, eating disorder     This evaluation was conducted via Zoom using secure and encrypted videoconferencing technology.   The patient was in their home in the [facility].    The patient's identity was confirmed and verbal consent was obtained for this virtual visit.    History Of Present Illness:  Ryanne Story is a 33 y.o. female with major depressive disorder, binge eating disorder, alcohol use disorder, suppurative hidradenitis comes in today for follow up, was last seen 3 months ago.  She has some struggles with depression in the last few months but lately she has been feeling better.  She has applied for a few jobs but has not heard anything yet.  She still has a lot of time on her hand and is struggling to find something that will occupy and stimulate her brain.  She has planned a few trips with her friends and is looking forward to them.  She has been compliant with her medications.  She has been doing better in regards to binge eating.  She denies any thoughts of wanting to hurt herself.    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    Substance Use:  Alcohol - Denies alcohol use since last appointment with me  Nicotine - No cigarettes for last 1 week, is vaping nicotine  Cannabis - Denies  Illicit drugs - Denies    Past Medication Trials:  Lamictal (effective), Zoloft (drug allergy), Prozac (ineffective), Viibryd (s/e - irritability), Effexor (ineffective), Seroquel 25 mg for sleep (effective), Geodon, Ritalin (ineffective), Chantix (s/e - nausea)    Medications:  Current Outpatient Medications   Medication Sig Dispense Refill    diphenhydrAMINE (BENADRYL) 25 MG Tab Take 25 mg by mouth at bedtime as needed for Sleep.      lisdexamfetamine (VYVANSE) 70 MG capsule Take 1 Capsule by mouth every morning  "for 30 days. 30 Capsule 0    carbamazepine SR (TEGRETOL XR) 200 MG TABLET SR 12 HR extended-release tablet Take 1 Tablet by mouth 2 times a day. 180 Tablet 0    escitalopram (LEXAPRO) 10 MG Tab Take 1 Tablet by mouth every day. 90 Tablet 0    spironolactone (ALDACTONE) 100 MG Tab Take 1 Tablet by mouth every day. 90 Tablet 0    albuterol 108 (90 Base) MCG/ACT Aero Soln inhalation aerosol Inhale 2 Puffs every four hours as needed for Shortness of Breath. 1 Each 0    tizanidine (ZANAFLEX) 4 MG Tab Take 1 tablet by mouth every 6 hours as needed (back pain). 90 tablet 0     No current facility-administered medications for this visit.     Review Of Systems:    Constitutional - Positive for fatigue  Psychiatric - Positive for depression    Physical Examination:  Vital signs: There were no vitals taken for this visit.    Musculoskeletal: No abnormal movements.     Mental Status Evaluation:   General: Young female, dressed in casual attire, good grooming and hygiene, in no apparent distress, calm and cooperative, good eye contact, no psychomotor agitation or retardation  Orientation: Alert and oriented to person, place and time  Recent and remote memory: Grossly intact  Attention span and concentration: Grossly intact  Speech: Spontaneous, normal rate, rhythm and tone  Thought Process: Linear, logical and goal directed  Thought Content: Denies suicidal or homicidal ideations, intent or plan  Perception: No delusions noted  Associations: Intact  Language: Appropriate  Fund of knowledge and vocabulary: Grossly adequate  Mood: \"okay\"  Affect: Euthymic, mood congruent  Insight: Good  Judgment: Good    Depression screening:      3/8/2022    12:27 PM 1/13/2023    10:30 AM 2/18/2023     4:34 PM   Depression Screen (PHQ-2/PHQ-9)   PHQ-2 Total Score 1  0   PHQ-2 Total Score  4    PHQ-9 Total Score 6     PHQ-9 Total Score  14      Interpretation of PHQ-9 Total Score   Score Severity   1-4 No Depression   5-9 Mild Depression   10-14 " Moderate Depression   15-19 Moderately Severe Depression   20-27 Severe Depression    Medical Records/Labs/Diagnostic Tests Reviewed:  [place] PDMP records - appropriate refills      Impression:  1.  Major depressive disorder, recurrent, mild - stable   2.  Binge eating disorder, mild - stable  3.  Alcohol use disorder, mild - improving   4.  History of bipolar mood disorder  5.  History of hallucinogen and cocaine use    Plan:  1.  Continue Lexapro 10 mg daily for depression.  2.  Continue Tegretol  mg twice daily for mood stabilization.    3.  Continue Vyvanse 70 mg in the morning for binge eating disorder.  E-prescribed x 3 months.  4.  Encouraged to avoid heavy and/or daily alcohol use  5.  Provided supportive psychotherapy (> 16 minutes): Encouragement in regards to her decision to apply for different jobs and how that is good for her mental health, encouraged working on having routine which is beneficial for her her mental health as well.    Return to clinic in 3 months or sooner if symptoms worsen    The proposed treatment plan was discussed with the patient who was provided the opportunity to ask questions and make suggestions regarding alternative treatment. Patient verbalized understanding and expressed agreement with the plan.     Sonja Alvarez M.D.  04/21/23    This note was created using voice recognition software (Dragon). The accuracy of the dictation is limited by the abilities of the software. I have reviewed the note prior to signing, however some errors in grammar and context are still possible. If you have any questions related to this note please do not hesitate to contact our office.

## 2023-07-27 ENCOUNTER — OFFICE VISIT (OUTPATIENT)
Dept: BEHAVIORAL HEALTH | Facility: CLINIC | Age: 33
End: 2023-07-27

## 2023-07-27 VITALS — HEIGHT: 68 IN | WEIGHT: 271 LBS | BODY MASS INDEX: 41.07 KG/M2

## 2023-07-27 DIAGNOSIS — F33.0 MDD (MAJOR DEPRESSIVE DISORDER), RECURRENT EPISODE, MILD (HCC): ICD-10-CM

## 2023-07-27 DIAGNOSIS — F50.81 BINGE-EATING DISORDER, MILD: ICD-10-CM

## 2023-07-27 DIAGNOSIS — F10.10 ALCOHOL USE DISORDER, MILD, ABUSE: ICD-10-CM

## 2023-07-27 PROCEDURE — 90833 PSYTX W PT W E/M 30 MIN: CPT | Performed by: PSYCHIATRY & NEUROLOGY

## 2023-07-27 PROCEDURE — 99214 OFFICE O/P EST MOD 30 MIN: CPT | Performed by: PSYCHIATRY & NEUROLOGY

## 2023-07-27 RX ORDER — LISDEXAMFETAMINE DIMESYLATE CAPSULES 70 MG/1
70 CAPSULE ORAL EVERY MORNING
Qty: 30 CAPSULE | Refills: 0 | Status: SHIPPED | OUTPATIENT
Start: 2023-08-15 | End: 2023-09-14

## 2023-07-27 RX ORDER — CARBAMAZEPINE 200 MG/1
200 TABLET, EXTENDED RELEASE ORAL 2 TIMES DAILY
Qty: 180 TABLET | Refills: 0 | Status: SHIPPED | OUTPATIENT
Start: 2023-07-27 | End: 2023-11-17 | Stop reason: SDUPTHER

## 2023-07-27 RX ORDER — LISDEXAMFETAMINE DIMESYLATE CAPSULES 70 MG/1
70 CAPSULE ORAL EVERY MORNING
Qty: 30 CAPSULE | Refills: 0 | Status: SHIPPED | OUTPATIENT
Start: 2023-10-12 | End: 2023-11-11

## 2023-07-27 RX ORDER — LISDEXAMFETAMINE DIMESYLATE CAPSULES 70 MG/1
70 CAPSULE ORAL EVERY MORNING
Qty: 30 CAPSULE | Refills: 0 | Status: SHIPPED | OUTPATIENT
Start: 2023-09-13 | End: 2023-10-13

## 2023-07-27 RX ORDER — ESCITALOPRAM OXALATE 10 MG/1
10 TABLET ORAL DAILY
Qty: 90 TABLET | Refills: 0 | Status: SHIPPED | OUTPATIENT
Start: 2023-07-27 | End: 2023-11-01

## 2023-07-27 ASSESSMENT — FIBROSIS 4 INDEX: FIB4 SCORE: 0.43

## 2023-07-27 NOTE — PROGRESS NOTES
PSYCHIATRY FOLLOW-UP NOTE    Chief Complaint   Patient presents with    Follow-Up     Depression, eating disorder, addiction      History Of Present Illness:  Ryanne Story is a 33 y.o. female with major depressive disorder, binge eating disorder, alcohol use disorder, suppurative hidradenitis comes in today for follow up, was last seen 3 months ago.  She has been having significant financial struggles depression.  She mentions that her father's company is having financial struggles and she has not been able to get a steady paycheck.  She has been using her savings and is worried about her long-term finances.  She does not want to get a regular job but has applied and has an interview with the [employer] next week.  She has primarily been staying at home and not doing much.  She has been sleeping through the daytime likely to escape from her worries.  She has been doing all right in regards to her eating.  She mentions that a few times she has had passive thoughts of death secondary to financial struggles but denies active thoughts, intent or plan of wanting to hurt herself.    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    Substance Use:  Alcohol - She had 2 or 3 beers with her friends last month, denies binge drinking episode  Nicotine - Denies smoking cigarettes  Cannabis - Denies  Illicit drugs - Denies    Past Medication Trials:  Lamictal (effective), Zoloft (drug allergy), Prozac (ineffective), Viibryd (s/e - irritability), Effexor (ineffective), Seroquel 25 mg for sleep (effective), Geodon, Ritalin (ineffective), Chantix (s/e - nausea)    Medications:  Current Outpatient Medications   Medication Sig Dispense Refill    lisdexamfetamine (VYVANSE) 70 MG capsule Take 1 Capsule by mouth every morning for 30 days. 30 Capsule 0    carbamazepine SR (TEGRETOL XR) 200 MG TABLET SR 12 HR extended-release tablet Take 1 Tablet by mouth 2 times a day.  "180 Tablet 0    escitalopram (LEXAPRO) 10 MG Tab Take 1 Tablet by mouth every day. 90 Tablet 0    diphenhydrAMINE (BENADRYL) 25 MG Tab Take 25 mg by mouth at bedtime as needed for Sleep.      spironolactone (ALDACTONE) 100 MG Tab Take 1 Tablet by mouth every day. 90 Tablet 0    albuterol 108 (90 Base) MCG/ACT Aero Soln inhalation aerosol Inhale 2 Puffs every four hours as needed for Shortness of Breath. 1 Each 0    tizanidine (ZANAFLEX) 4 MG Tab Take 1 tablet by mouth every 6 hours as needed (back pain). 90 tablet 0     No current facility-administered medications for this visit.     Review Of Systems:    Constitutional - Positive for fatigue  Psychiatric - Positive for depression    Physical Examination:  Vital signs: Ht 1.727 m (5' 8\")   Wt 123 kg (271 lb)   BMI 41.21 kg/m²     Musculoskeletal: No abnormal movements.     Mental Status Evaluation:   General: Young female, dressed in casual attire, good grooming and hygiene, in no apparent distress, calm and cooperative, fair eye contact, no psychomotor agitation or retardation  Orientation: Alert and oriented to person, place and time  Recent and remote memory: Grossly intact  Attention span and concentration: Grossly intact  Speech: Spontaneous, normal rate, rhythm and tone  Thought Process: Linear, logical and goal directed  Thought Content: Denies suicidal or homicidal ideations, intent or plan  Perception: No delusions noted  Associations: Intact  Language: Appropriate  Fund of knowledge and vocabulary: Grossly adequate  Mood: \"okay\"  Affect: Dysphoric at times, mood congruent  Insight: Good  Judgment: Good    Depression screening:      3/8/2022    12:27 PM 1/13/2023    10:30 AM 2/18/2023     4:34 PM   Depression Screen (PHQ-2/PHQ-9)   PHQ-2 Total Score 1  0   PHQ-2 Total Score  4    PHQ-9 Total Score 6     PHQ-9 Total Score  14      Interpretation of PHQ-9 Total Score   Score Severity   1-4 No Depression   5-9 Mild Depression   10-14 Moderate Depression "   15-19 Moderately Severe Depression   20-27 Severe Depression    Medical Records/Labs/Diagnostic Tests Reviewed:  [place] PDMP records - appropriate refills      Impression:  1.  Major depressive disorder, recurrent, mild - stable   2.  Binge eating disorder, mild - stable  3.  Alcohol use disorder, mild (last alcohol use 6/16/23) - stable   4.  History of bipolar mood disorder  5.  History of hallucinogen and cocaine use    Plan:  1.  Continue Lexapro 10 mg daily for depression.  2.  Continue Tegretol  mg twice daily for mood stabilization.    3.  Continue Vyvanse 70 mg in the morning for binge eating disorder.  E-prescribed x 3 months.  4.  Encouraged to avoid heavy and/or daily alcohol use  5.  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     Return to clinic in 3 months or sooner if symptoms worsen    The proposed treatment plan was discussed with the patient who was provided the opportunity to ask questions and make suggestions regarding alternative treatment. Patient verbalized understanding and expressed agreement with the plan.     Sonja Alvarez M.D.  07/27/23    This note was created using voice recognition software (Dragon). The accuracy of the dictation is limited by the abilities of the software. I have reviewed the note prior to signing, however some errors in grammar and context are still possible. If you have any questions related to this note please do not hesitate to contact our office.

## 2023-09-13 ENCOUNTER — DOCUMENTATION (OUTPATIENT)
Dept: HEALTH INFORMATION MANAGEMENT | Facility: OTHER | Age: 33
End: 2023-09-13
Payer: COMMERCIAL

## 2023-10-20 ENCOUNTER — DOCUMENTATION (OUTPATIENT)
Dept: BEHAVIORAL HEALTH | Facility: CLINIC | Age: 33
End: 2023-10-20
Payer: COMMERCIAL

## 2023-10-31 NOTE — TELEPHONE ENCOUNTER
Received request via: Pharmacy    Was the patient seen in the last year in this department? Yes    Does the patient have an active prescription (recently filled or refills available) for medication(s) requested? No    Does the patient have alf Plus and need 100 day supply (blood pressure, diabetes and cholesterol meds only)? Medication is not for cholesterol, blood pressure or diabetes and Patient does not have SCP

## 2023-11-01 RX ORDER — ESCITALOPRAM OXALATE 10 MG/1
10 TABLET ORAL DAILY
Qty: 30 TABLET | Refills: 0 | Status: SHIPPED | OUTPATIENT
Start: 2023-11-01 | End: 2023-11-17 | Stop reason: SDUPTHER

## 2023-11-15 ENCOUNTER — TELEPHONE (OUTPATIENT)
Dept: HEALTH INFORMATION MANAGEMENT | Facility: OTHER | Age: 33
End: 2023-11-15
Payer: COMMERCIAL

## 2023-11-17 ENCOUNTER — TELEMEDICINE (OUTPATIENT)
Dept: BEHAVIORAL HEALTH | Facility: CLINIC | Age: 33
End: 2023-11-17
Payer: COMMERCIAL

## 2023-11-17 DIAGNOSIS — F33.0 MDD (MAJOR DEPRESSIVE DISORDER), RECURRENT EPISODE, MILD (HCC): ICD-10-CM

## 2023-11-17 DIAGNOSIS — F10.10 ALCOHOL USE DISORDER, MILD, ABUSE: ICD-10-CM

## 2023-11-17 DIAGNOSIS — F50.81 BINGE-EATING DISORDER, MILD: ICD-10-CM

## 2023-11-17 PROCEDURE — 99214 OFFICE O/P EST MOD 30 MIN: CPT | Mod: 95 | Performed by: PSYCHIATRY & NEUROLOGY

## 2023-11-17 RX ORDER — PROPRANOLOL HYDROCHLORIDE 10 MG/1
10 TABLET ORAL 2 TIMES DAILY
COMMUNITY
Start: 2023-09-08

## 2023-11-17 RX ORDER — LISDEXAMFETAMINE DIMESYLATE CAPSULES 70 MG/1
70 CAPSULE ORAL EVERY MORNING
Qty: 30 CAPSULE | Refills: 0 | Status: SHIPPED | OUTPATIENT
Start: 2023-11-21 | End: 2023-12-21

## 2023-11-17 RX ORDER — CARBAMAZEPINE 200 MG/1
200 TABLET, EXTENDED RELEASE ORAL 2 TIMES DAILY
Qty: 60 TABLET | Refills: 2 | Status: SHIPPED | OUTPATIENT
Start: 2023-11-17 | End: 2024-02-16 | Stop reason: SDUPTHER

## 2023-11-17 RX ORDER — ESCITALOPRAM OXALATE 10 MG/1
10 TABLET ORAL DAILY
Qty: 30 TABLET | Refills: 2 | Status: SHIPPED | OUTPATIENT
Start: 2023-11-17 | End: 2024-02-16 | Stop reason: SDUPTHER

## 2023-11-17 RX ORDER — LISDEXAMFETAMINE DIMESYLATE CAPSULES 70 MG/1
70 CAPSULE ORAL EVERY MORNING
Qty: 30 CAPSULE | Refills: 0 | Status: SHIPPED | OUTPATIENT
Start: 2023-12-20 | End: 2024-01-19

## 2023-11-17 RX ORDER — LISDEXAMFETAMINE DIMESYLATE CAPSULES 70 MG/1
70 CAPSULE ORAL EVERY MORNING
Qty: 30 CAPSULE | Refills: 0 | Status: SHIPPED | OUTPATIENT
Start: 2024-01-18 | End: 2024-02-17

## 2023-11-17 NOTE — PROGRESS NOTES
PSYCHIATRY VIRTUAL VISIT FOLLOW-UP NOTE    Chief Complaint   Patient presents with    Follow-Up     Depression, eating disorder     This evaluation was conducted via Zoom using secure and encrypted videoconferencing technology.   The patient was in their home in the [facility].    The patient's identity was confirmed and verbal consent was obtained for this virtual visit.    History Of Present Illness:  Ryanne Story is a 33 y.o. female with major depressive disorder, binge eating disorder, alcohol use disorder, suppurative hidradenitis comes in today for follow up, was last seen about 4 months ago.  She continues to have struggles with her mental health.  She is having financial stressors primarily related to income decline from her job over the pandemic.  She has tried to apply for few jobs here and there but has not been able to get a job which has been disappointing.  She has been compliant with her medications.  She mentions that for the last few months she has had some struggles with palpitations, tachycardia and having episodic dizziness, lightheadedness to the point where she gets close to fainting.  She saw her PCP who started her on Propranolol 10 mg twice daily.  She is also getting a Cardiology work.  She denies history if cardiac disease.  She has been staying in touch with her friends and seeing them on a regular basis.  She denies having thoughts of wanting to hurt herself.    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    Substance Use:  Alcohol - Denies daily drinking or binge drinking episodes in the last few months  Nicotine - Denies  Cannabis - Denies  Illicit drugs - Denies    Past Medication Trials:  Lamictal (effective), Zoloft (drug allergy), Prozac (ineffective), Viibryd (s/e - irritability), Effexor (ineffective), Seroquel 25 mg for sleep (effective), Geodon, Ritalin (ineffective), Chantix (s/e -  "nausea)    Medications:  Current Outpatient Medications   Medication Sig Dispense Refill    propranolol (INDERAL) 10 MG Tab Take 10 mg by mouth 2 times a day.      escitalopram (LEXAPRO) 10 MG Tab Take 1 Tablet by mouth every day. 30 Tablet 0    carbamazepine SR (TEGRETOL XR) 200 MG TABLET SR 12 HR extended-release tablet Take 1 Tablet by mouth 2 times a day. 180 Tablet 0    diphenhydrAMINE (BENADRYL) 25 MG Tab Take 25 mg by mouth at bedtime as needed for Sleep.      spironolactone (ALDACTONE) 100 MG Tab Take 1 Tablet by mouth every day. 90 Tablet 0    albuterol 108 (90 Base) MCG/ACT Aero Soln inhalation aerosol Inhale 2 Puffs every four hours as needed for Shortness of Breath. 1 Each 0    tizanidine (ZANAFLEX) 4 MG Tab Take 1 tablet by mouth every 6 hours as needed (back pain). 90 tablet 0     No current facility-administered medications for this visit.     Review Of Systems:    Constitutional - Positive for fatigue  Psychiatric - Positive for depression    Physical Examination:  Vital signs: There were no vitals taken for this visit.    Musculoskeletal: No abnormal movements.     Mental Status Evaluation:   General: Young female, dressed in casual attire, good grooming and hygiene, in no apparent distress, calm and cooperative, fair eye contact, no psychomotor agitation or retardation  Orientation: Alert and oriented to person, place and time  Recent and remote memory: Grossly intact  Attention span and concentration: Grossly intact  Speech: Spontaneous, normal rate, rhythm and tone  Thought Process: Linear, logical and goal directed  Thought Content: Denies suicidal or homicidal ideations, intent or plan  Perception: No delusions noted  Associations: Intact  Language: Appropriate  Fund of knowledge and vocabulary: Grossly adequate  Mood: \"okay\"  Affect: Euthymic, mood congruent  Insight: Good  Judgment: Good    Depression screening:      3/8/2022    12:27 PM 1/13/2023    10:30 AM 2/18/2023     4:34 PM "   Depression Screen (PHQ-2/PHQ-9)   PHQ-2 Total Score 1  0   PHQ-2 Total Score  4    PHQ-9 Total Score 6     PHQ-9 Total Score  14      Interpretation of PHQ-9 Total Score   Score Severity   1-4 No Depression   5-9 Mild Depression   10-14 Moderate Depression   15-19 Moderately Severe Depression   20-27 Severe Depression    Medical Records/Labs/Diagnostic Tests Reviewed:  [place] PDMP records - appropriate refills      Impression:  1.  Major depressive disorder, recurrent, mild - stable   2.  Binge eating disorder, mild - stable  3.  Alcohol use disorder, mild - stable   4.  History of bipolar mood disorder  5.  History of hallucinogen and cocaine use    Plan:  1.  Continue Lexapro 10 mg daily for depression.  2.  Continue Tegretol  mg twice daily for mood stabilization.    3.  Continue Vyvanse 70 mg in the morning for binge eating disorder.  E-prescribed x 3 months.  Discussed that Vyvanse can cause both tachycardia and palpitations.  Will await Cardiology work up results and consider decreasing Vyvanse dose reduction to manage side effects.  4.  Encouraged to avoid heavy and/or daily alcohol use    Return to clinic in 3 months or sooner if symptoms worsen    The proposed treatment plan was discussed with the patient who was provided the opportunity to ask questions and make suggestions regarding alternative treatment. Patient verbalized understanding and expressed agreement with the plan.     Sonja Alvarez M.D.  11/17/23    This note was created using voice recognition software (Dragon). The accuracy of the dictation is limited by the abilities of the software. I have reviewed the note prior to signing, however some errors in grammar and context are still possible. If you have any questions related to this note please do not hesitate to contact our office.

## 2024-02-15 ASSESSMENT — PATIENT HEALTH QUESTIONNAIRE - PHQ9
8. MOVING OR SPEAKING SO SLOWLY THAT OTHER PEOPLE COULD HAVE NOTICED. OR THE OPPOSITE, BEING SO FIGETY OR RESTLESS THAT YOU HAVE BEEN MOVING AROUND A LOT MORE THAN USUAL: 0
7. TROUBLE CONCENTRATING ON THINGS, SUCH AS READING THE NEWSPAPER OR WATCHING TELEVISION: SEVERAL DAYS
SUM OF ALL RESPONSES TO PHQ QUESTIONS 1-9: 10
3. TROUBLE FALLING OR STAYING ASLEEP OR SLEEPING TOO MUCH: 3
2. FEELING DOWN, DEPRESSED, IRRITABLE, OR HOPELESS: SEVERAL DAYS
2. FEELING DOWN, DEPRESSED, IRRITABLE, OR HOPELESS: 1
4. FEELING TIRED OR HAVING LITTLE ENERGY: 1
5. POOR APPETITE OR OVEREATING: 1
7. TROUBLE CONCENTRATING ON THINGS, SUCH AS READING THE NEWSPAPER OR WATCHING TELEVISION: 1
5. POOR APPETITE OR OVEREATING: 1 - SEVERAL DAYS
SUM OF ALL RESPONSES TO PHQ QUESTIONS 1-9: 10
10. IF YOU CHECKED OFF ANY PROBLEMS, HOW DIFFICULT HAVE THESE PROBLEMS MADE IT FOR YOU TO DO YOUR WORK, TAKE CARE OF THINGS AT HOME, OR GET ALONG WITH OTHER PEOPLE: SOMEWHAT DIFFICULT
8. MOVING OR SPEAKING SO SLOWLY THAT OTHER PEOPLE COULD HAVE NOTICED. OR THE OPPOSITE, BEING SO FIGETY OR RESTLESS THAT YOU HAVE BEEN MOVING AROUND A LOT MORE THAN USUAL: NOT AT ALL
1. LITTLE INTEREST OR PLEASURE IN DOING THINGS: SEVERAL DAYS
9. THOUGHTS THAT YOU WOULD BE BETTER OFF DEAD, OR OF HURTING YOURSELF: 0
9. THOUGHTS THAT YOU WOULD BE BETTER OFF DEAD, OR OF HURTING YOURSELF: NOT AT ALL
1. LITTLE INTEREST OR PLEASURE IN DOING THINGS: 1
3. TROUBLE FALLING OR STAYING ASLEEP OR SLEEPING TOO MUCH: NEARLY EVERY DAY
4. FEELING TIRED OR HAVING LITTLE ENERGY: SEVERAL DAYS
6. FEELING BAD ABOUT YOURSELF - OR THAT YOU ARE A FAILURE OR HAVE LET YOURSELF OR YOUR FAMILY DOWN: MORE THAN HALF THE DAYS
6. FEELING BAD ABOUT YOURSELF - OR THAT YOU ARE A FAILURE OR HAVE LET YOURSELF OR YOUR FAMILY DOWN: 2
5. POOR APPETITE OR OVEREATING: SEVERAL DAYS

## 2024-02-16 ENCOUNTER — TELEMEDICINE (OUTPATIENT)
Dept: BEHAVIORAL HEALTH | Facility: CLINIC | Age: 34
End: 2024-02-16
Payer: COMMERCIAL

## 2024-02-16 DIAGNOSIS — F33.0 MDD (MAJOR DEPRESSIVE DISORDER), RECURRENT EPISODE, MILD (HCC): ICD-10-CM

## 2024-02-16 DIAGNOSIS — F50.81 BINGE-EATING DISORDER, MILD: ICD-10-CM

## 2024-02-16 DIAGNOSIS — F10.10 ALCOHOL USE DISORDER, MILD, ABUSE: ICD-10-CM

## 2024-02-16 PROCEDURE — 96127 BRIEF EMOTIONAL/BEHAV ASSMT: CPT | Mod: 95 | Performed by: PSYCHIATRY & NEUROLOGY

## 2024-02-16 PROCEDURE — 99214 OFFICE O/P EST MOD 30 MIN: CPT | Mod: 95 | Performed by: PSYCHIATRY & NEUROLOGY

## 2024-02-16 RX ORDER — LISDEXAMFETAMINE DIMESYLATE CAPSULES 70 MG/1
70 CAPSULE ORAL EVERY MORNING
Qty: 30 CAPSULE | Refills: 0 | Status: SHIPPED | OUTPATIENT
Start: 2024-04-25 | End: 2024-05-25

## 2024-02-16 RX ORDER — LISDEXAMFETAMINE DIMESYLATE CAPSULES 70 MG/1
70 CAPSULE ORAL EVERY MORNING
Qty: 30 CAPSULE | Refills: 0 | Status: SHIPPED | OUTPATIENT
Start: 2024-03-27 | End: 2024-03-26 | Stop reason: SDUPTHER

## 2024-02-16 RX ORDER — ESCITALOPRAM OXALATE 10 MG/1
10 TABLET ORAL DAILY
Qty: 30 TABLET | Refills: 2 | Status: SHIPPED | OUTPATIENT
Start: 2024-02-16

## 2024-02-16 RX ORDER — CARBAMAZEPINE 200 MG/1
200 TABLET, EXTENDED RELEASE ORAL 2 TIMES DAILY
Qty: 60 TABLET | Refills: 2 | Status: SHIPPED | OUTPATIENT
Start: 2024-02-16

## 2024-02-16 RX ORDER — LISDEXAMFETAMINE DIMESYLATE CAPSULES 70 MG/1
70 CAPSULE ORAL EVERY MORNING
Qty: 30 CAPSULE | Refills: 0 | Status: SHIPPED | OUTPATIENT
Start: 2024-02-27 | End: 2024-03-28

## 2024-02-16 NOTE — PROGRESS NOTES
PSYCHIATRY VIRTUAL VISIT FOLLOW-UP NOTE    Chief Complaint   Patient presents with    Follow-Up     Mood, eating disorder     This evaluation was conducted via Zoom using secure and encrypted videoconferencing technology.   The patient was in their home in the [facility].    The patient's identity was confirmed and verbal consent was obtained for this virtual visit.    History Of Present Illness:  Ryanne Story is a 34 y.o. female with major depressive disorder, binge eating disorder, alcohol use disorder, suppurative hidradenitis comes in today for follow up, was last seen 3 months ago.  She has been doing all right in regards to her mental health.  She has been compliant with her medications.  She had cardiology workup which was negative and she has not had any significant concerns with palpitations or dizziness.  She has been taking it easy and spending a lot of time at home.  She has not been actively looking for a job.  She is hoping that she can travel to [place] sometime this spring.  She has been doing all right with her medications.  She denies any side effects so far.  She denies having thoughts of wanting to hurt herself.    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    Substance Use:  Alcohol - 2 shots of alcohol on her birthday, denies any binge drinking episodes  Nicotine - Denies  Cannabis - Denies  Illicit drugs - Denies    Past Medication Trials:  Lamictal (effective), Zoloft (drug allergy), Prozac (ineffective), Viibryd (s/e - irritability), Effexor (ineffective), Seroquel 25 mg for sleep (effective), Geodon, Ritalin (ineffective), Chantix (s/e - nausea)    Medications:  Current Outpatient Medications   Medication Sig Dispense Refill    [START ON 2/27/2024] lisdexamfetamine (VYVANSE) 70 MG capsule Take 1 Capsule by mouth every morning for 30 days. 30 Capsule 0    [START ON 3/27/2024] lisdexamfetamine (VYVANSE) 70 MG  capsule Take 1 Capsule by mouth every morning for 30 days. 30 Capsule 0    [START ON 4/25/2024] lisdexamfetamine (VYVANSE) 70 MG capsule Take 1 Capsule by mouth every morning for 30 days. 30 Capsule 0    carbamazepine SR (TEGRETOL XR) 200 MG TABLET SR 12 HR extended-release tablet Take 1 Tablet by mouth 2 times a day. 60 Tablet 2    escitalopram (LEXAPRO) 10 MG Tab Take 1 Tablet by mouth every day. 30 Tablet 2    lisdexamfetamine (VYVANSE) 70 MG capsule Take 1 Capsule by mouth every morning for 30 days. 30 Capsule 0    diphenhydrAMINE (BENADRYL) 25 MG Tab Take 25 mg by mouth at bedtime as needed for Sleep.      spironolactone (ALDACTONE) 100 MG Tab Take 1 Tablet by mouth every day. 90 Tablet 0    albuterol 108 (90 Base) MCG/ACT Aero Soln inhalation aerosol Inhale 2 Puffs every four hours as needed for Shortness of Breath. 1 Each 0    tizanidine (ZANAFLEX) 4 MG Tab Take 1 tablet by mouth every 6 hours as needed (back pain). 90 tablet 0    propranolol (INDERAL) 10 MG Tab Take 10 mg by mouth 2 times a day. (Patient not taking: Reported on 2/16/2024)       No current facility-administered medications for this visit.     Review Of Systems:    Constitutional - Positive for fatigue  Psychiatric - Positive for depression    Physical Examination:  Vital signs: There were no vitals taken for this visit.    Musculoskeletal: No abnormal movements.     Mental Status Evaluation:   General: Young female, dressed in casual attire, good grooming and hygiene, in no apparent distress, calm and cooperative, fair eye contact, no psychomotor agitation or retardation  Orientation: Alert and oriented to person, place and time  Recent and remote memory: Grossly intact  Attention span and concentration: Grossly intact  Speech: Spontaneous, normal rate, rhythm and tone  Thought Process: Linear, logical and goal directed  Thought Content: Denies suicidal or homicidal ideations, intent or plan  Perception: No delusions noted  Associations:  "Intact  Language: Appropriate  Fund of knowledge and vocabulary: Grossly adequate  Mood: \"okay\"  Affect: Euthymic, mood congruent  Insight: Good  Judgment: Good    Depression screenin/13/2023    10:30 AM 2023     4:34 PM 2024    10:30 AM   Depression Screen (PHQ-2/PHQ-9)   PHQ-2 Total Score  0    PHQ-2 Total Score 4     PHQ-9 Total Score 14  10     Interpretation of PHQ-9 Total Score   Score Severity   1-4 No Depression   5-9 Mild Depression   10-14 Moderate Depression   15-19 Moderately Severe Depression   20-27 Severe Depression    Medical Records/Labs/Diagnostic Tests Reviewed:  [place] PDMP records - appropriate refills      Impression:  1.  Major depressive disorder, recurrent, mild - stable   2.  Binge eating disorder, mild - stable  3.  Alcohol use disorder, mild - stable   4.  History of bipolar mood disorder  5.  History of hallucinogen and cocaine use    Plan:  1.  Continue Lexapro 10 mg daily for depression.  2.  Continue Tegretol  mg twice daily for mood stabilization.    3.  Continue Vyvanse 70 mg in the morning for binge eating disorder.  E-prescribed x 3 months.  She is aware of the ongoing nationwide stimulant medication .  4.  Encouraged to avoid heavy and/or daily alcohol use    Return to clinic in 3 months or sooner if symptoms worsen    The proposed treatment plan was discussed with the patient who was provided the opportunity to ask questions and make suggestions regarding alternative treatment. Patient verbalized understanding and expressed agreement with the plan.     Sonja Alvarez M.D.  24    This note was created using voice recognition software (Dragon). The accuracy of the dictation is limited by the abilities of the software. I have reviewed the note prior to signing, however some errors in grammar and context are still possible. If you have any questions related to this note please do not hesitate to contact our office.   "

## 2024-05-24 ENCOUNTER — TELEMEDICINE (OUTPATIENT)
Dept: BEHAVIORAL HEALTH | Facility: CLINIC | Age: 34
End: 2024-05-24

## 2024-05-24 DIAGNOSIS — F33.0 MDD (MAJOR DEPRESSIVE DISORDER), RECURRENT EPISODE, MILD (HCC): ICD-10-CM

## 2024-05-24 DIAGNOSIS — F50.81 BINGE-EATING DISORDER, MILD: ICD-10-CM

## 2024-05-24 DIAGNOSIS — F10.10 ALCOHOL USE DISORDER, MILD, ABUSE: ICD-10-CM

## 2024-05-24 PROCEDURE — 99214 OFFICE O/P EST MOD 30 MIN: CPT | Mod: 95 | Performed by: PSYCHIATRY & NEUROLOGY

## 2024-05-24 RX ORDER — LISDEXAMFETAMINE DIMESYLATE 70 MG/1
70 CAPSULE ORAL EVERY MORNING
Qty: 30 CAPSULE | Refills: 0 | Status: SHIPPED | OUTPATIENT
Start: 2024-06-02 | End: 2024-07-02

## 2024-05-24 RX ORDER — CARBAMAZEPINE 200 MG/1
200 TABLET, EXTENDED RELEASE ORAL 2 TIMES DAILY
Qty: 60 TABLET | Refills: 2 | Status: SHIPPED | OUTPATIENT
Start: 2024-05-24

## 2024-05-24 RX ORDER — LISDEXAMFETAMINE DIMESYLATE 70 MG/1
70 CAPSULE ORAL EVERY MORNING
Qty: 30 CAPSULE | Refills: 0 | Status: SHIPPED | OUTPATIENT
Start: 2024-07-01 | End: 2024-07-31

## 2024-05-24 RX ORDER — LISDEXAMFETAMINE DIMESYLATE 70 MG/1
70 CAPSULE ORAL EVERY MORNING
Qty: 30 CAPSULE | Refills: 0 | Status: SHIPPED | OUTPATIENT
Start: 2024-07-30 | End: 2024-08-29

## 2024-05-24 RX ORDER — ESCITALOPRAM OXALATE 10 MG/1
10 TABLET ORAL DAILY
Qty: 30 TABLET | Refills: 2 | Status: SHIPPED | OUTPATIENT
Start: 2024-05-24

## 2024-05-24 NOTE — PROGRESS NOTES
PSYCHIATRY VIRTUAL VISIT FOLLOW-UP NOTE    Chief Complaint   Patient presents with    Follow-Up     Mood, eating disorder     This evaluation was conducted via Zoom using secure and encrypted videoconferencing technology.   [place] [facility]    The patient's identity was confirmed and verbal consent was obtained for this virtual visit.    History Of Present Illness:  Ryanne Story is a 34 y.o. female with major depressive disorder, binge eating disorder, alcohol use disorder, suppurative hidradenitis comes in today for follow up, was last seen 3 months ago.  She took a trip to [place] in April and had a really nice time there.  She has been having a difficult time with her mental health since she came back home.  She continues to look for a better paying job but unfortunately has not had any luck.  She continues to be financially dependent on her father's business which is not doing great as well.  She has been staying at home and playing video games but is staying in touch with her friends.  She mentions that when she was on vacation she felt that she was not being noticed and she really liked that feeling.  However since she has been back she has been struggling more with body image issues.  She has been compliant with her medications.  She does not think that she is binge eating but her relationship with food has been different since she came back home.  She denies having thoughts of wanting to hurt herself.    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    Substance Use:  Alcohol - She had 1 alcoholic drink during vacation in April  Nicotine - Denies  Cannabis - Denies  Illicit drugs - Denies    Past Medication Trials:  Lamictal (effective), Zoloft (drug allergy), Prozac (ineffective), Viibryd (s/e - irritability), Effexor (ineffective), Seroquel 25 mg for sleep (effective), Geodon, Ritalin  (ineffective), Chantix (s/e - nausea)    Medications:  Current Outpatient Medications   Medication Sig Dispense Refill    [START ON 6/2/2024] lisdexamfetamine (VYVANSE) 70 MG capsule Take 1 Capsule by mouth every morning for 30 days. 30 Capsule 0    [START ON 7/1/2024] lisdexamfetamine (VYVANSE) 70 MG capsule Take 1 Capsule by mouth every morning for 30 days. 30 Capsule 0    [START ON 7/30/2024] lisdexamfetamine (VYVANSE) 70 MG capsule Take 1 Capsule by mouth every morning for 30 days. 30 Capsule 0    carbamazepine SR (TEGRETOL XR) 200 MG TABLET SR 12 HR extended-release tablet Take 1 Tablet by mouth 2 times a day. 60 Tablet 2    escitalopram (LEXAPRO) 10 MG Tab Take 1 Tablet by mouth every day. 30 Tablet 2    lisdexamfetamine (VYVANSE) 70 MG capsule Take 1 Capsule by mouth every morning for 30 days. 30 Capsule 0    propranolol (INDERAL) 10 MG Tab Take 10 mg by mouth 2 times a day.      diphenhydrAMINE (BENADRYL) 25 MG Tab Take 25 mg by mouth at bedtime as needed for Sleep.      spironolactone (ALDACTONE) 100 MG Tab Take 1 Tablet by mouth every day. 90 Tablet 0    albuterol 108 (90 Base) MCG/ACT Aero Soln inhalation aerosol Inhale 2 Puffs every four hours as needed for Shortness of Breath. 1 Each 0    tizanidine (ZANAFLEX) 4 MG Tab Take 1 tablet by mouth every 6 hours as needed (back pain). 90 tablet 0     No current facility-administered medications for this visit.     Review Of Systems:    Constitutional - Positive for fatigue  Psychiatric - Positive for depression    Physical Examination:  Vital signs: There were no vitals taken for this visit.    Musculoskeletal: No abnormal movements.     Mental Status Evaluation:   General: Young female, dressed in casual attire, good grooming and hygiene, in no apparent distress, calm and cooperative, fair eye contact, no psychomotor agitation or retardation  Orientation: Alert and oriented to person, place and time  Recent and remote memory: Grossly intact  Attention span  "and concentration: Grossly intact  Speech: Spontaneous, normal rate, rhythm and tone  Thought Process: Linear, logical and goal directed  Thought Content: Denies suicidal or homicidal ideations, intent or plan  Perception: No delusions noted  Associations: Intact  Language: Appropriate  Fund of knowledge and vocabulary: Grossly adequate  Mood: \"okay\"  Affect: Euthymic, mood congruent  Insight: Good  Judgment: Good    Depression screenin/13/2023    10:30 AM 2023     4:34 PM 2024    10:30 AM   Depression Screen (PHQ-2/PHQ-9)   PHQ-2 Total Score  0    PHQ-2 Total Score 4     PHQ-9 Total Score 14  10     Interpretation of PHQ-9 Total Score   Score Severity   1-4 No Depression   5-9 Mild Depression   10-14 Moderate Depression   15-19 Moderately Severe Depression   20-27 Severe Depression    Medical Records/Labs/Diagnostic Tests Reviewed:  [facility] records - appropriate refills      Impression:  1.  Major depressive disorder, recurrent, mild - stable   2.  Binge eating disorder, mild - stable  3.  Alcohol use disorder, mild - stable   4.  History of bipolar mood disorder  5.  History of hallucinogen and cocaine use    Plan:  1.  Continue Lexapro 10 mg daily for depression.  2.  Continue Tegretol  mg twice daily for mood stabilization.    3.  Continue Vyvanse 70 mg in the morning for binge eating disorder.  E-prescribed x 3 months.  She is aware of the ongoing stimulant medication shortage  4.  Encouraged to avoid heavy and/or daily alcohol use    Return to clinic in 3 months or sooner if symptoms worsen    The proposed treatment plan was discussed with the patient who was provided the opportunity to ask questions and make suggestions regarding alternative treatment. Patient verbalized understanding and expressed agreement with the plan.     Sonja Alvarez M.D.  24    This note was created using voice recognition software (Dragon). The accuracy of the dictation is limited by the abilities of " the software. I have reviewed the note prior to signing, however some errors in grammar and context are still possible. If you have any questions related to this note please do not hesitate to contact our office.

## 2024-08-30 ENCOUNTER — TELEMEDICINE (OUTPATIENT)
Dept: BEHAVIORAL HEALTH | Facility: CLINIC | Age: 34
End: 2024-08-30
Payer: COMMERCIAL

## 2024-08-30 DIAGNOSIS — F50.81 BINGE-EATING DISORDER, MILD: ICD-10-CM

## 2024-08-30 DIAGNOSIS — F10.11 ALCOHOL USE DISORDER, MILD, IN EARLY REMISSION: ICD-10-CM

## 2024-08-30 DIAGNOSIS — F33.0 MDD (MAJOR DEPRESSIVE DISORDER), RECURRENT EPISODE, MILD (HCC): ICD-10-CM

## 2024-08-30 RX ORDER — ESCITALOPRAM OXALATE 10 MG/1
10 TABLET ORAL DAILY
Qty: 30 TABLET | Refills: 2 | Status: SHIPPED | OUTPATIENT
Start: 2024-08-30

## 2024-08-30 RX ORDER — CARBAMAZEPINE 200 MG/1
200 TABLET, EXTENDED RELEASE ORAL 2 TIMES DAILY
Qty: 60 TABLET | Refills: 2 | Status: SHIPPED | OUTPATIENT
Start: 2024-08-30

## 2024-08-30 RX ORDER — LISDEXAMFETAMINE DIMESYLATE 70 MG/1
70 CAPSULE ORAL EVERY MORNING
Qty: 30 CAPSULE | Refills: 0 | Status: SHIPPED | OUTPATIENT
Start: 2024-09-04 | End: 2024-10-04

## 2024-08-30 RX ORDER — LISDEXAMFETAMINE DIMESYLATE 70 MG/1
70 CAPSULE ORAL EVERY MORNING
Qty: 30 CAPSULE | Refills: 0 | Status: SHIPPED | OUTPATIENT
Start: 2024-11-01 | End: 2024-12-01

## 2024-08-30 RX ORDER — LISDEXAMFETAMINE DIMESYLATE 70 MG/1
70 CAPSULE ORAL EVERY MORNING
Qty: 30 CAPSULE | Refills: 0 | Status: SHIPPED | OUTPATIENT
Start: 2024-10-03 | End: 2024-11-02

## 2024-08-30 NOTE — PROGRESS NOTES
PSYCHIATRY VIRTUAL VISIT FOLLOW-UP NOTE    Chief Complaint   Patient presents with    Follow-Up     Mood, eating disorder     This evaluation was conducted via Teams using secure and encrypted videoconferencing technology.   The patient was in their home in the [facility].    The patient's identity was confirmed and verbal consent was obtained for this virtual visit.    History Of Present Illness:  Ryanne Story is a 34 y.o. female with major depressive disorder, binge eating disorder, alcohol use disorder, suppurative hidradenitis comes in today for follow up, was last seen 3 months ago.  She has been feeling steady in regards to her mental health since her last appointment with me, denies any big changes in her life in the last few months.  She has been spending most of the time at home but is also trying to look for a job but so far nothing good has come her way.  She has been compliant with her medications and feels that they have been keeping her steady.  She has been doing good in regards to her appetite and eating denies any recent binge eating episodes.  She denies having thoughts of wanting to hurt herself.    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    Substance Use:  Alcohol - Denies, last alcohol use 4/2024  Nicotine - Denies  Cannabis - Denies  Illicit drugs - Denies    Past Medication Trials:  Lamictal (effective), Zoloft (drug allergy), Prozac (ineffective), Viibryd (s/e - irritability), Effexor (ineffective), Seroquel 25 mg for sleep (effective), Geodon, Ritalin (ineffective), Chantix (s/e - nausea)    Medications:  Current Outpatient Medications   Medication Sig Dispense Refill    [START ON 9/4/2024] lisdexamfetamine (VYVANSE) 70 MG capsule Take 1 Capsule by mouth every morning for 30 days. 30 Capsule 0    [START ON 10/3/2024] lisdexamfetamine (VYVANSE) 70 MG capsule Take 1 Capsule by mouth every morning for 30 days.  "30 Capsule 0    [START ON 2024] lisdexamfetamine (VYVANSE) 70 MG capsule Take 1 Capsule by mouth every morning for 30 days. 30 Capsule 0    carbamazepine SR (TEGRETOL XR) 200 MG TABLET SR 12 HR extended-release tablet Take 1 Tablet by mouth 2 times a day. 60 Tablet 2    escitalopram (LEXAPRO) 10 MG Tab Take 1 Tablet by mouth every day. 30 Tablet 2    propranolol (INDERAL) 10 MG Tab Take 10 mg by mouth 2 times a day.      diphenhydrAMINE (BENADRYL) 25 MG Tab Take 25 mg by mouth at bedtime as needed for Sleep.      spironolactone (ALDACTONE) 100 MG Tab Take 1 Tablet by mouth every day. 90 Tablet 0     No current facility-administered medications for this visit.     Review Of Systems:    Constitutional - Positive for fatigue  Psychiatric - Positive for depression    Physical Examination:  Vital signs: There were no vitals taken for this visit.    Musculoskeletal: No abnormal movements.     Mental Status Evaluation:   General: Young female, dressed in casual attire, good grooming and hygiene, in no apparent distress, calm and cooperative, fair eye contact, no psychomotor agitation or retardation  Orientation: Alert and oriented to person, place and time  Recent and remote memory: Grossly intact  Attention span and concentration: Grossly intact  Speech: Spontaneous, normal rate, rhythm and tone  Thought Process: Linear, logical and goal directed  Thought Content: Denies suicidal or homicidal ideations, intent or plan  Perception: No delusions noted  Associations: Intact  Language: Appropriate  Fund of knowledge and vocabulary: Grossly adequate  Mood: \"okay\"  Affect: Euthymic, mood congruent  Insight: Good  Judgment: Good    Depression screenin/13/2023    10:30 AM 2023     4:34 PM 2024    10:30 AM   Depression Screen (PHQ-2/PHQ-9)   PHQ-2 Total Score  0    PHQ-2 Total Score 4     PHQ-9 Total Score 14  10     Interpretation of PHQ-9 Total Score   Score Severity   1-4 No Depression   5-9 Mild " Depression   10-14 Moderate Depression   15-19 Moderately Severe Depression   20-27 Severe Depression    Medical Records/Labs/Diagnostic Tests Reviewed:  [place] PDMP records - appropriate refills      Impression:  1.  Major depressive disorder, recurrent, mild - stable   2.  Binge eating disorder, mild - stable  3.  Alcohol use disorder, mild, in early remission - stable   4.  History of bipolar mood disorder  5.  History of hallucinogen and cocaine use    Plan:  1.  Continue Lexapro 10 mg daily for depression.  2.  Continue Tegretol  mg twice daily for mood stabilization.    3.  Continue Vyvanse 70 mg in the morning for binge eating disorder.  E-prescribed x 3 months.  She is aware of the medication shortage  4.  Encouraged to avoid heavy and/or daily alcohol use    Return to clinic in 3 months or sooner if symptoms worsen    The proposed treatment plan was discussed with the patient who was provided the opportunity to ask questions and make suggestions regarding alternative treatment. Patient verbalized understanding and expressed agreement with the plan.     Sonja Alvarez M.D.  08/30/24    This note was created using voice recognition software (Dragon). The accuracy of the dictation is limited by the abilities of the software. I have reviewed the note prior to signing, however some errors in grammar and context are still possible. If you have any questions related to this note please do not hesitate to contact our office.

## 2024-11-15 ENCOUNTER — RESEARCH ENCOUNTER (OUTPATIENT)
Dept: RESEARCH | Facility: MEDICAL CENTER | Age: 34
End: 2024-11-15
Payer: COMMERCIAL

## 2024-11-15 DIAGNOSIS — Z00.6 RESEARCH STUDY PATIENT: ICD-10-CM

## 2024-11-18 ENCOUNTER — HOSPITAL ENCOUNTER (OUTPATIENT)
Dept: LAB | Facility: MEDICAL CENTER | Age: 34
End: 2024-11-18
Attending: FAMILY MEDICINE
Payer: COMMERCIAL

## 2024-11-18 DIAGNOSIS — Z00.6 RESEARCH STUDY PATIENT: ICD-10-CM

## 2024-12-03 ENCOUNTER — TELEMEDICINE (OUTPATIENT)
Dept: BEHAVIORAL HEALTH | Facility: CLINIC | Age: 34
End: 2024-12-03
Payer: COMMERCIAL

## 2024-12-03 DIAGNOSIS — F50.810 BINGE-EATING DISORDER, MILD: ICD-10-CM

## 2024-12-03 DIAGNOSIS — F33.0 MDD (MAJOR DEPRESSIVE DISORDER), RECURRENT EPISODE, MILD (HCC): ICD-10-CM

## 2024-12-03 DIAGNOSIS — F10.11 ALCOHOL USE DISORDER, MILD, IN SUSTAINED REMISSION: ICD-10-CM

## 2024-12-03 PROBLEM — F12.90 MARIJUANA USE: Status: RESOLVED | Noted: 2020-02-21 | Resolved: 2024-12-03

## 2024-12-03 PROBLEM — F17.200 NICOTINE USE DISORDER: Status: RESOLVED | Noted: 2020-02-21 | Resolved: 2024-12-03

## 2024-12-03 PROCEDURE — 90833 PSYTX W PT W E/M 30 MIN: CPT | Mod: 95 | Performed by: PSYCHIATRY & NEUROLOGY

## 2024-12-03 PROCEDURE — 99214 OFFICE O/P EST MOD 30 MIN: CPT | Mod: 95 | Performed by: PSYCHIATRY & NEUROLOGY

## 2024-12-03 RX ORDER — LISDEXAMFETAMINE DIMESYLATE 70 MG/1
70 CAPSULE ORAL EVERY MORNING
Qty: 30 CAPSULE | Refills: 0 | Status: SHIPPED | OUTPATIENT
Start: 2025-01-04 | End: 2025-02-03

## 2024-12-03 RX ORDER — ALBUTEROL SULFATE 90 UG/1
INHALANT RESPIRATORY (INHALATION)
COMMUNITY
Start: 2024-11-19

## 2024-12-03 RX ORDER — LISDEXAMFETAMINE DIMESYLATE 70 MG/1
70 CAPSULE ORAL EVERY MORNING
Qty: 30 CAPSULE | Refills: 0 | Status: SHIPPED | OUTPATIENT
Start: 2025-02-02 | End: 2025-03-04

## 2024-12-03 RX ORDER — CARBAMAZEPINE 200 MG/1
200 TABLET, EXTENDED RELEASE ORAL 2 TIMES DAILY
Qty: 60 TABLET | Refills: 2 | Status: SHIPPED | OUTPATIENT
Start: 2024-12-03

## 2024-12-03 RX ORDER — LISDEXAMFETAMINE DIMESYLATE 70 MG/1
70 CAPSULE ORAL EVERY MORNING
Qty: 30 CAPSULE | Refills: 0 | Status: SHIPPED | OUTPATIENT
Start: 2024-12-06 | End: 2025-01-05

## 2024-12-03 RX ORDER — ESCITALOPRAM OXALATE 10 MG/1
10 TABLET ORAL DAILY
Qty: 30 TABLET | Refills: 2 | Status: SHIPPED | OUTPATIENT
Start: 2024-12-03

## 2024-12-03 NOTE — PROGRESS NOTES
PSYCHIATRY VIRTUAL VISIT FOLLOW-UP NOTE    Chief Complaint   Patient presents with    Follow-Up     Mood, eating disorder     This evaluation was conducted via Teams using secure and encrypted videoconferencing technology.   The patient was in their home in the [facility].    The patient's identity was confirmed and verbal consent was obtained for this virtual visit.    History Of Present Illness:  Ryanne Story is a 34 y.o. female with major depressive disorder, binge eating disorder, alcohol use disorder, suppurative hidradenitis comes in today for follow up, was last seen 3 months ago.  She has been struggling with frustrations especially with the economy and election results.  She has not been able to find a job that matches her skills that and being dependent on her parents adds to that frustration.  She was able to see some friends for Thanksgiving and had a nice time with them.  She does spend a lot of time at home.  She has noticed that she has engaged in some binge eating in the last few weeks to cope with her emotions.  She has been sleeping well.  She has been compliant with her medications.  She denies any binge alcohol use.  She denies having thoughts of wanting to hurt herself.    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    Substance Use:  Alcohol - One Mimosa on Thanksgiving   Nicotine - Denies  Cannabis - Denies  Illicit drugs - Denies    Past Medication Trials:  Lamictal (effective), Zoloft (drug allergy), Prozac (ineffective), Viibryd (s/e - irritability), Effexor (ineffective), Seroquel 25 mg for sleep (effective), Geodon, Ritalin (ineffective), Chantix (s/e - nausea)    Medications:  Current Outpatient Medications   Medication Sig Dispense Refill    metFORMIN (GLUCOPHAGE) 500 MG Tab Take 500 mg by mouth every day.      albuterol 108 (90 Base) MCG/ACT Aero Soln inhalation aerosol       [START ON 12/6/2024]  "lisdexamfetamine (VYVANSE) 70 MG capsule Take 1 Capsule by mouth every morning for 30 days. 30 Capsule 0    [START ON 2025] lisdexamfetamine (VYVANSE) 70 MG capsule Take 1 Capsule by mouth every morning for 30 days. 30 Capsule 0    [START ON 2025] lisdexamfetamine (VYVANSE) 70 MG capsule Take 1 Capsule by mouth every morning for 30 days. 30 Capsule 0    escitalopram (LEXAPRO) 10 MG Tab Take 1 Tablet by mouth every day. 30 Tablet 2    carbamazepine SR (TEGRETOL XR) 200 MG TABLET SR 12 HR extended-release tablet Take 1 Tablet by mouth 2 times a day. 60 Tablet 2    propranolol (INDERAL) 10 MG Tab Take 10 mg by mouth 2 times a day.      diphenhydrAMINE (BENADRYL) 25 MG Tab Take 25 mg by mouth at bedtime as needed for Sleep.      spironolactone (ALDACTONE) 100 MG Tab Take 1 Tablet by mouth every day. 90 Tablet 0     No current facility-administered medications for this visit.     Review Of Systems:    Constitutional - Positive for fatigue  Psychiatric - Positive for depression    Physical Examination:  Vital signs: There were no vitals taken for this visit.    Musculoskeletal: No abnormal movements.     Mental Status Evaluation:   General: Young female, dressed in casual attire, good grooming and hygiene, in no apparent distress, calm and cooperative, fair eye contact, no psychomotor agitation or retardation  Orientation: Alert and oriented to person, place and time  Recent and remote memory: Grossly intact  Attention span and concentration: Grossly intact  Speech: Spontaneous, normal rate, rhythm and tone  Thought Process: Linear, logical and goal directed  Thought Content: Denies suicidal or homicidal ideations, intent or plan  Perception: No delusions noted  Associations: Intact  Language: Appropriate  Fund of knowledge and vocabulary: Grossly adequate  Mood: \"not too bad\"  Affect: Euthymic, mood congruent  Insight: Good  Judgment: Good    Depression screenin/13/2023    10:30 AM 2023     4:34 PM " 2/16/2024    10:30 AM   Depression Screen (PHQ-2/PHQ-9)   PHQ-2 Total Score  0    PHQ-2 Total Score 4     PHQ-9 Total Score 14  10     Interpretation of PHQ-9 Total Score   Score Severity   1-4 No Depression   5-9 Mild Depression   10-14 Moderate Depression   15-19 Moderately Severe Depression   20-27 Severe Depression    Medical Records/Labs/Diagnostic Tests Reviewed:  [place] PDMP records - appropriate refills      Impression:  1.  Major depressive disorder, recurrent, mild - stable   2.  Binge eating disorder, mild - stable  3.  Alcohol use disorder, mild, in sustained remission - stable   4.  History of bipolar mood disorder, hallucinogen and cocaine use    Plan:  1.  Continue Lexapro 10 mg daily for depression.  2.  Continue Tegretol  mg twice daily for mood stabilization.  She does not meet criteria for bipolar mood disorder.  3.  Continue Vyvanse 70 mg in the morning for binge eating disorder.  E-prescribed x 3 months.  She is aware of the medication shortage  4.  Encouraged to avoid heavy and/or daily alcohol use  5.  Provided supportive psychotherapy (> 16 minutes): Validated her feelings of helplessness related to politics and economy, advised her to continue working on things that are in her control including looking and applying for jobs that can help improve her finances in the long run.    Return to clinic in 3 months or sooner if symptoms worsen    The proposed treatment plan was discussed with the patient who was provided the opportunity to ask questions and make suggestions regarding alternative treatment. Patient verbalized understanding and expressed agreement with the plan.     Sonja Alvarez M.D.  12/03/24    This note was created using voice recognition software (Dragon). The accuracy of the dictation is limited by the abilities of the software. I have reviewed the note prior to signing, however some errors in grammar and context are still possible. If you have any questions related to this  note please do not hesitate to contact our office.

## 2024-12-09 LAB
APOB+LDLR+PCSK9 GENE MUT ANL BLD/T: NOT DETECTED
BRCA1+BRCA2 DEL+DUP + FULL MUT ANL BLD/T: NOT DETECTED
MLH1+MSH2+MSH6+PMS2 GN DEL+DUP+FUL M: NOT DETECTED

## 2025-03-04 ENCOUNTER — TELEMEDICINE (OUTPATIENT)
Dept: BEHAVIORAL HEALTH | Facility: CLINIC | Age: 35
End: 2025-03-04
Payer: COMMERCIAL

## 2025-03-04 DIAGNOSIS — F10.11 ALCOHOL USE DISORDER, MILD, IN SUSTAINED REMISSION: ICD-10-CM

## 2025-03-04 DIAGNOSIS — Z79.899 ENCOUNTER FOR LONG-TERM (CURRENT) USE OF MEDICATIONS: ICD-10-CM

## 2025-03-04 DIAGNOSIS — F50.810 BINGE-EATING DISORDER, MILD: ICD-10-CM

## 2025-03-04 DIAGNOSIS — F33.0 MDD (MAJOR DEPRESSIVE DISORDER), RECURRENT EPISODE, MILD (HCC): ICD-10-CM

## 2025-03-04 PROCEDURE — 99214 OFFICE O/P EST MOD 30 MIN: CPT | Performed by: PSYCHIATRY & NEUROLOGY

## 2025-03-04 RX ORDER — ESCITALOPRAM OXALATE 10 MG/1
10 TABLET ORAL DAILY
Qty: 30 TABLET | Refills: 2 | Status: SHIPPED | OUTPATIENT
Start: 2025-03-04

## 2025-03-04 RX ORDER — LISDEXAMFETAMINE DIMESYLATE 70 MG/1
70 CAPSULE ORAL EVERY MORNING
Qty: 30 CAPSULE | Refills: 0 | Status: SHIPPED | OUTPATIENT
Start: 2025-05-04 | End: 2025-06-03

## 2025-03-04 RX ORDER — LEVONORGESTREL 52 MG/1
INTRAUTERINE DEVICE INTRAUTERINE
COMMUNITY

## 2025-03-04 RX ORDER — LISDEXAMFETAMINE DIMESYLATE 70 MG/1
70 CAPSULE ORAL EVERY MORNING
Qty: 30 CAPSULE | Refills: 0 | Status: SHIPPED | OUTPATIENT
Start: 2025-03-07 | End: 2025-04-06

## 2025-03-04 RX ORDER — CARBAMAZEPINE 200 MG/1
200 TABLET, EXTENDED RELEASE ORAL 2 TIMES DAILY
Qty: 60 TABLET | Refills: 2 | Status: SHIPPED | OUTPATIENT
Start: 2025-03-04

## 2025-03-04 RX ORDER — LISDEXAMFETAMINE DIMESYLATE 70 MG/1
70 CAPSULE ORAL EVERY MORNING
Qty: 30 CAPSULE | Refills: 0 | Status: SHIPPED | OUTPATIENT
Start: 2025-04-05 | End: 2025-05-05

## 2025-03-04 RX ORDER — TIZANIDINE HYDROCHLORIDE 4 MG/1
CAPSULE, GELATIN COATED ORAL
COMMUNITY
End: 2025-03-04

## 2025-03-04 ASSESSMENT — PATIENT HEALTH QUESTIONNAIRE - PHQ9
6. FEELING BAD ABOUT YOURSELF - OR THAT YOU ARE A FAILURE OR HAVE LET YOURSELF OR YOUR FAMILY DOWN: 2
3. TROUBLE FALLING OR STAYING ASLEEP OR SLEEPING TOO MUCH: 2
9. THOUGHTS THAT YOU WOULD BE BETTER OFF DEAD, OR OF HURTING YOURSELF: SEVERAL DAYS
4. FEELING TIRED OR HAVING LITTLE ENERGY: MORE THAN HALF THE DAYS
6. FEELING BAD ABOUT YOURSELF - OR THAT YOU ARE A FAILURE OR HAVE LET YOURSELF OR YOUR FAMILY DOWN: MORE THAN HALF THE DAYS
8. MOVING OR SPEAKING SO SLOWLY THAT OTHER PEOPLE COULD HAVE NOTICED. OR THE OPPOSITE, BEING SO FIGETY OR RESTLESS THAT YOU HAVE BEEN MOVING AROUND A LOT MORE THAN USUAL: 1
8. MOVING OR SPEAKING SO SLOWLY THAT OTHER PEOPLE COULD HAVE NOTICED. OR THE OPPOSITE, BEING SO FIGETY OR RESTLESS THAT YOU HAVE BEEN MOVING AROUND A LOT MORE THAN USUAL: SEVERAL DAYS
SUM OF ALL RESPONSES TO PHQ9 QUESTIONS 1 AND 2: 5
SUM OF ALL RESPONSES TO PHQ QUESTIONS 1-9: 19
5. POOR APPETITE OR OVEREATING: 3
3. TROUBLE FALLING OR STAYING ASLEEP OR SLEEPING TOO MUCH: MORE THAN HALF THE DAYS
2. FEELING DOWN, DEPRESSED, IRRITABLE, OR HOPELESS: 2
6. FEELING BAD ABOUT YOURSELF - OR THAT YOU ARE A FAILURE OR HAVE LET YOURSELF OR YOUR FAMILY DOWN: MORE THAN HALF THE DAYS
1. LITTLE INTEREST OR PLEASURE IN DOING THINGS: NEARLY EVERY DAY
5. POOR APPETITE OR OVEREATING: NEARLY EVERY DAY
5. POOR APPETITE OR OVEREATING: NEARLY EVERY DAY
7. TROUBLE CONCENTRATING ON THINGS, SUCH AS READING THE NEWSPAPER OR WATCHING TELEVISION: NEARLY EVERY DAY
4. FEELING TIRED OR HAVING LITTLE ENERGY: MORE THAN HALF THE DAYS
2. FEELING DOWN, DEPRESSED, IRRITABLE, OR HOPELESS: MORE THAN HALF THE DAYS
8. MOVING OR SPEAKING SO SLOWLY THAT OTHER PEOPLE COULD HAVE NOTICED. OR THE OPPOSITE, BEING SO FIGETY OR RESTLESS THAT YOU HAVE BEEN MOVING AROUND A LOT MORE THAN USUAL: SEVERAL DAYS
1. LITTLE INTEREST OR PLEASURE IN DOING THINGS: 3
4. FEELING TIRED OR HAVING LITTLE ENERGY: 2
3. TROUBLE FALLING OR STAYING ASLEEP OR SLEEPING TOO MUCH: MORE THAN HALF THE DAYS
2. FEELING DOWN, DEPRESSED, IRRITABLE, OR HOPELESS: MORE THAN HALF THE DAYS
7. TROUBLE CONCENTRATING ON THINGS, SUCH AS READING THE NEWSPAPER OR WATCHING TELEVISION: 3
9. THOUGHTS THAT YOU WOULD BE BETTER OFF DEAD, OR OF HURTING YOURSELF: SEVERAL DAYS
9. THOUGHTS THAT YOU WOULD BE BETTER OFF DEAD, OR OF HURTING YOURSELF: 1
10. IF YOU CHECKED OFF ANY PROBLEMS, HOW DIFFICULT HAVE THESE PROBLEMS MADE IT FOR YOU TO DO YOUR WORK, TAKE CARE OF THINGS AT HOME, OR GET ALONG WITH OTHER PEOPLE: VERY DIFFICULT
5. POOR APPETITE OR OVEREATING: 3 - NEARLY EVERY DAY
CLINICAL INTERPRETATION OF PHQ2 SCORE: 0
SUM OF ALL RESPONSES TO PHQ QUESTIONS 1-9: 19
1. LITTLE INTEREST OR PLEASURE IN DOING THINGS: NEARLY EVERY DAY

## 2025-03-04 NOTE — PROGRESS NOTES
PSYCHIATRY VIRTUAL VISIT FOLLOW-UP NOTE    Chief Complaint   Patient presents with    Follow-Up     Depression, eating disorder     This evaluation was conducted via Teams using secure and encrypted videoconferencing technology.   The patient was in their home in the [facility].    The patient's identity was confirmed and verbal consent was obtained for this virtual visit.    History Of Present Illness:  Ryanne Story is a 35 y.o. female with major depressive disorder, binge eating disorder, alcohol use disorder, suppurative hidradenitis comes in today for follow up, was last seen 3 months ago.  She mentions that things have not changed much in the last few months.  She is still having a hard time finding a job that needs her qualifications and needs.  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  She is trying to stay in touch with her friends so that she is having a reason to get out of the house.  She has been compliant with her medications.  She denies any recent binge eating episodes.  She denies having thoughts of wanting to hurt herself.    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    Substance Use:  Alcohol - She had one drink on her birthday  Nicotine - Denies  Cannabis - Denies  Illicit drugs - Denies    Past Medication Trials:  Lamictal (effective), Zoloft (drug allergy), Prozac (ineffective), Viibryd (s/e - irritability), Effexor (ineffective), Seroquel 25 mg for sleep (effective), Geodon, Ritalin (ineffective), Chantix (s/e - nausea)    Medications:  Current Outpatient Medications   Medication Sig Dispense Refill    [START ON 3/7/2025] lisdexamfetamine (VYVANSE) 70 MG capsule Take 1 Capsule by mouth every morning for 30 days. 30 Capsule 0    [START ON 4/5/2025] lisdexamfetamine (VYVANSE) 70 MG capsule Take 1 Capsule by mouth every morning for 30 days. 30 Capsule 0    [START ON 5/4/2025] lisdexamfetamine  "(VYVANSE) 70 MG capsule Take 1 Capsule by mouth every morning for 30 days. 30 Capsule 0    levonorgestrel (MIRENA, 52 MG,) 20 MCG/DAY IUD as directed Intrauterine      carbamazepine SR (TEGRETOL XR) 200 MG TABLET SR 12 HR extended-release tablet Take 1 Tablet by mouth 2 times a day. 60 Tablet 2    escitalopram (LEXAPRO) 10 MG Tab Take 1 Tablet by mouth every day. 30 Tablet 2    metFORMIN (GLUCOPHAGE) 500 MG Tab Take 500 mg by mouth every day.      albuterol 108 (90 Base) MCG/ACT Aero Soln inhalation aerosol       lisdexamfetamine (VYVANSE) 70 MG capsule Take 1 Capsule by mouth every morning for 30 days. 30 Capsule 0    propranolol (INDERAL) 10 MG Tab Take 10 mg by mouth 2 times a day.      diphenhydrAMINE (BENADRYL) 25 MG Tab Take 25 mg by mouth at bedtime as needed for Sleep.      spironolactone (ALDACTONE) 100 MG Tab Take 1 Tablet by mouth every day. 90 Tablet 0     No current facility-administered medications for this visit.     Review Of Systems:    Constitutional - Positive for fatigue  Psychiatric - Positive for depression    Physical Examination:  Vital signs: There were no vitals taken for this visit.    Musculoskeletal: No abnormal movements.     Mental Status Evaluation:   General: Young female, dressed in casual attire, good grooming and hygiene, in no apparent distress, calm and cooperative, fair eye contact, no psychomotor agitation or retardation  Orientation: Alert and oriented to person, place and time  Recent and remote memory: Grossly intact  Attention span and concentration: Grossly intact  Speech: Spontaneous, normal rate, rhythm and tone  Thought Process: Linear, logical and goal directed  Thought Content: Denies suicidal or homicidal ideations, intent or plan  Perception: No delusions noted  Associations: Intact  Language: Appropriate  Fund of knowledge and vocabulary: Grossly adequate  Mood: \"not too bad\"  Affect: Euthymic, mood congruent  Insight: Good  Judgment: Good    Depression " screenin/16/2024    10:30 AM 3/4/2025     9:32 AM 3/4/2025    10:00 AM   Depression Screen (PHQ-2/PHQ-9)   PHQ-2 Total Score  5    PHQ-2 Total Score   0   PHQ-9 Total Score  19    PHQ-9 Total Score 10       Interpretation of PHQ-9 Total Score   Score Severity   1-4 No Depression   5-9 Mild Depression   10-14 Moderate Depression   15-19 Moderately Severe Depression   20-27 Severe Depression    Medical Records/Labs/Diagnostic Tests Reviewed:  [place] PDMP records - appropriate refills      Impression:  1.  Major depressive disorder, recurrent, mild - stable   2.  Binge eating disorder, mild - stable  3.  Alcohol use disorder, mild, in sustained remission - stable   4.  Long term use of medications - Tegretol, Vyvanse  5.  History of bipolar mood disorder, hallucinogen and cocaine use    Plan:  1.  Continue Lexapro 10 mg daily for depression.  2.  Continue Tegretol  mg twice daily for mood stabilization.  She does not meet criteria for bipolar mood disorder.  -CBC and CMP ordered today  3.  Continue Vyvanse 70 mg in the morning for binge eating disorder.  E-prescribed x 3 months.  She is aware of the medication shortage  -UDS ordered today  4.  Encouraged to avoid heavy and/or daily alcohol use    Return to clinic in 3 months or sooner if symptoms worsen    The proposed treatment plan was discussed with the patient who was provided the opportunity to ask questions and make suggestions regarding alternative treatment. Patient verbalized understanding and expressed agreement with the plan.     Sonja Alvarez M.D.  25    This note was created using voice recognition software (Dragon). The accuracy of the dictation is limited by the abilities of the software. I have reviewed the note prior to signing, however some errors in grammar and context are still possible. If you have any questions related to this note please do not hesitate to contact our office.

## 2025-04-21 ENCOUNTER — HOSPITAL ENCOUNTER (OUTPATIENT)
Dept: LAB | Facility: MEDICAL CENTER | Age: 35
End: 2025-04-21
Attending: PSYCHIATRY & NEUROLOGY
Payer: COMMERCIAL

## 2025-04-21 DIAGNOSIS — Z79.899 ENCOUNTER FOR LONG-TERM (CURRENT) USE OF MEDICATIONS: ICD-10-CM

## 2025-04-21 LAB
ALBUMIN SERPL BCP-MCNC: 4.5 G/DL (ref 3.2–4.9)
ALBUMIN/GLOB SERPL: 1 G/DL
ALP SERPL-CCNC: 95 U/L (ref 30–99)
ALT SERPL-CCNC: 26 U/L (ref 2–50)
ANION GAP SERPL CALC-SCNC: 15 MMOL/L (ref 7–16)
AST SERPL-CCNC: 30 U/L (ref 12–45)
BILIRUB SERPL-MCNC: 0.4 MG/DL (ref 0.1–1.5)
BUN SERPL-MCNC: 9 MG/DL (ref 8–22)
CALCIUM ALBUM COR SERPL-MCNC: 9.6 MG/DL (ref 8.5–10.5)
CALCIUM SERPL-MCNC: 10 MG/DL (ref 8.5–10.5)
CHLORIDE SERPL-SCNC: 100 MMOL/L (ref 96–112)
CO2 SERPL-SCNC: 20 MMOL/L (ref 20–33)
CREAT SERPL-MCNC: 0.76 MG/DL (ref 0.5–1.4)
ERYTHROCYTE [DISTWIDTH] IN BLOOD BY AUTOMATED COUNT: 43.6 FL (ref 35.9–50)
GFR SERPLBLD CREATININE-BSD FMLA CKD-EPI: 104 ML/MIN/1.73 M 2
GLOBULIN SER CALC-MCNC: 4.3 G/DL (ref 1.9–3.5)
GLUCOSE SERPL-MCNC: 103 MG/DL (ref 65–99)
HCT VFR BLD AUTO: 45.8 % (ref 37–47)
HGB BLD-MCNC: 15 G/DL (ref 12–16)
MCH RBC QN AUTO: 29.2 PG (ref 27–33)
MCHC RBC AUTO-ENTMCNC: 32.8 G/DL (ref 32.2–35.5)
MCV RBC AUTO: 89.3 FL (ref 81.4–97.8)
PLATELET # BLD AUTO: 405 K/UL (ref 164–446)
PMV BLD AUTO: 10.4 FL (ref 9–12.9)
POTASSIUM SERPL-SCNC: 3.8 MMOL/L (ref 3.6–5.5)
PROT SERPL-MCNC: 8.8 G/DL (ref 6–8.2)
RBC # BLD AUTO: 5.13 M/UL (ref 4.2–5.4)
SODIUM SERPL-SCNC: 135 MMOL/L (ref 135–145)
WBC # BLD AUTO: 14.6 K/UL (ref 4.8–10.8)

## 2025-04-21 PROCEDURE — 85027 COMPLETE CBC AUTOMATED: CPT

## 2025-04-21 PROCEDURE — 36415 COLL VENOUS BLD VENIPUNCTURE: CPT

## 2025-04-21 PROCEDURE — G0480 DRUG TEST DEF 1-7 CLASSES: HCPCS

## 2025-04-21 PROCEDURE — 80307 DRUG TEST PRSMV CHEM ANLYZR: CPT

## 2025-04-21 PROCEDURE — 80053 COMPREHEN METABOLIC PANEL: CPT

## 2025-04-22 ENCOUNTER — RESULTS FOLLOW-UP (OUTPATIENT)
Dept: BEHAVIORAL HEALTH | Facility: CLINIC | Age: 35
End: 2025-04-22

## 2025-04-23 LAB
AMPHET CTO UR CFM-MCNC: NORMAL NG/ML
BARBITURATES CTO UR CFM-MCNC: NEGATIVE NG/ML
BENZODIAZ CTO UR CFM-MCNC: NEGATIVE NG/ML
CANNABINOIDS CTO UR CFM-MCNC: NORMAL NG/ML
COCAINE CTO UR CFM-MCNC: NEGATIVE NG/ML
CREAT UR-MCNC: 133.3 MG/DL (ref 20–400)
DRUG COMMENT 753798: NORMAL
METHADONE CTO UR CFM-MCNC: NEGATIVE NG/ML
OPIATES CTO UR CFM-MCNC: NEGATIVE NG/ML
PCP CTO UR CFM-MCNC: NEGATIVE NG/ML
PROPOXYPH CTO UR CFM-MCNC: NEGATIVE NG/ML

## 2025-04-25 LAB
AMPHET UR CFM-MCNC: 4947 NG/ML
MDA UR CFM-MCNC: <200 NG/ML
MDEA UR CFM-MCNC: <200 NG/ML
MDMA UR CFM-MCNC: <200 NG/ML
METHAMPHET UR CFM-MCNC: <200 NG/ML
PHENTERMINE UR CFM-MCNC: <200 NG/ML
THC UR CFM-MCNC: >500 NG/ML

## 2025-06-03 ENCOUNTER — TELEMEDICINE (OUTPATIENT)
Dept: BEHAVIORAL HEALTH | Facility: CLINIC | Age: 35
End: 2025-06-03
Payer: COMMERCIAL

## 2025-06-03 DIAGNOSIS — F10.11 ALCOHOL USE DISORDER, MILD, IN SUSTAINED REMISSION: ICD-10-CM

## 2025-06-03 DIAGNOSIS — F33.1 MDD (MAJOR DEPRESSIVE DISORDER), RECURRENT EPISODE, MODERATE (HCC): Primary | ICD-10-CM

## 2025-06-03 DIAGNOSIS — F50.810 BINGE-EATING DISORDER, MILD: ICD-10-CM

## 2025-06-03 PROCEDURE — 99214 OFFICE O/P EST MOD 30 MIN: CPT | Mod: 95 | Performed by: PSYCHIATRY & NEUROLOGY

## 2025-06-03 PROCEDURE — 90833 PSYTX W PT W E/M 30 MIN: CPT | Mod: 95 | Performed by: PSYCHIATRY & NEUROLOGY

## 2025-06-03 RX ORDER — CARBAMAZEPINE 200 MG/1
200 TABLET, EXTENDED RELEASE ORAL 2 TIMES DAILY
Qty: 60 TABLET | Refills: 1 | Status: SHIPPED | OUTPATIENT
Start: 2025-06-03

## 2025-06-03 RX ORDER — LISDEXAMFETAMINE DIMESYLATE 70 MG/1
70 CAPSULE ORAL EVERY MORNING
Qty: 30 CAPSULE | Refills: 0 | Status: SHIPPED | OUTPATIENT
Start: 2025-06-06 | End: 2025-07-06

## 2025-06-03 RX ORDER — ESCITALOPRAM OXALATE 20 MG/1
20 TABLET ORAL DAILY
Qty: 30 TABLET | Refills: 1 | Status: SHIPPED | OUTPATIENT
Start: 2025-06-03

## 2025-06-03 RX ORDER — LISDEXAMFETAMINE DIMESYLATE 70 MG/1
70 CAPSULE ORAL EVERY MORNING
Qty: 30 CAPSULE | Refills: 0 | Status: SHIPPED | OUTPATIENT
Start: 2025-07-05 | End: 2025-08-04

## 2025-06-03 NOTE — PROGRESS NOTES
PSYCHIATRY VIRTUAL VISIT FOLLOW-UP NOTE    Chief Complaint   Patient presents with    Follow-Up     Depression, eating disorder     This evaluation was conducted via Teams using secure and encrypted videoconferencing technology.   The patient was in their home in the [facility].    The patient's identity was confirmed and verbal consent was obtained for this virtual visit.    History Of Present Illness:  Ryanne Story is a 35 y.o. female with major depressive disorder, binge eating disorder, alcohol use disorder, suppurative hidradenitis comes in today for follow up, was last seen 3 months ago.  She has been struggling with feelings of frustration and having more arguments with her mother.  She has been trying to find a better job so that she can move out of her parents house.  However, she has not been able to find one and it appears that this is causing some friction at home.  She was tearful for most of her appointment today.  She has previously lived independently and managed life as an adult but unfortunately for the last few years has been living with her parents which has also kept her isolated.  She has been compliant with her medications.  She has had some thoughts of death but denies active thoughts, intent or plan of wanting to hurt herself.    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    Substance Use:  Alcohol - Denies   Nicotine - Denies  Cannabis - Denies  Illicit drugs - Denies    Past Medication Trials:  Lamictal (effective), Zoloft (drug allergy), Prozac (ineffective), Viibryd (s/e - irritability), Effexor (ineffective), Seroquel 25 mg for sleep (effective), Geodon, Ritalin (ineffective), Chantix (s/e - nausea)    Medications:  Current Outpatient Medications   Medication Sig Dispense Refill    escitalopram (LEXAPRO) 20 MG tablet Take 1 Tablet by mouth every day. 30 Tablet 1    [START ON 6/6/2025] lisdexamfetamine  "(VYVANSE) 70 MG capsule Take 1 Capsule by mouth every morning for 30 days. 30 Capsule 0    [START ON 7/5/2025] lisdexamfetamine (VYVANSE) 70 MG capsule Take 1 Capsule by mouth every morning for 30 days. 30 Capsule 0    carbamazepine SR (TEGRETOL XR) 200 MG TABLET SR 12 HR extended-release tablet Take 1 Tablet by mouth 2 times a day. 60 Tablet 1    lisdexamfetamine (VYVANSE) 70 MG capsule Take 1 Capsule by mouth every morning for 30 days. 30 Capsule 0    levonorgestrel (MIRENA, 52 MG,) 20 MCG/DAY IUD as directed Intrauterine      metFORMIN (GLUCOPHAGE) 500 MG Tab Take 500 mg by mouth every day.      albuterol 108 (90 Base) MCG/ACT Aero Soln inhalation aerosol       propranolol (INDERAL) 10 MG Tab Take 10 mg by mouth 2 times a day.      diphenhydrAMINE (BENADRYL) 25 MG Tab Take 25 mg by mouth at bedtime as needed for Sleep.      spironolactone (ALDACTONE) 100 MG Tab Take 1 Tablet by mouth every day. 90 Tablet 0     No current facility-administered medications for this visit.     Review Of Systems:    Constitutional - Positive for fatigue  Psychiatric - Positive for depression    Physical Examination:  Vital signs: There were no vitals taken for this visit.    Musculoskeletal: No abnormal movements.     Mental Status Evaluation:   General: Young female, tearful, dressed in casual attire, good grooming and hygiene, in no apparent distress, calm and cooperative, fair eye contact, no psychomotor agitation or retardation  Orientation: Alert and oriented to person, place and time  Recent and remote memory: Grossly intact  Attention span and concentration: Grossly intact  Speech: Spontaneous, normal rate, rhythm and tone  Thought Process: Linear, logical and goal directed  Thought Content: Denies suicidal or homicidal ideations, intent or plan  Perception: No delusions noted  Associations: Intact  Language: Appropriate  Fund of knowledge and vocabulary: Grossly adequate  Mood: \"bad\"  Affect: Dysphoric, mood " congruent  Insight: Good  Judgment: Good    Depression screenin/16/2024    10:30 AM 3/4/2025     9:32 AM 3/4/2025    10:00 AM   Depression Screen (PHQ-2/PHQ-9)   PHQ-2 Total Score  5    PHQ-2 Total Score   0   PHQ-9 Total Score  19    PHQ-9 Total Score 10       Interpretation of PHQ-9 Total Score   Score Severity   1-4 No Depression   5-9 Mild Depression   10-14 Moderate Depression   15-19 Moderately Severe Depression   20-27 Severe Depression    Medical Records/Labs/Diagnostic Tests Reviewed:  [place] PDMP records - appropriate refills  Labs from 2025 - leukocytosis, normal sodium, normal ALT/AST and ALP, UDS positive for cannabis and amphetamines        Impression:  1.  Major depressive disorder, recurrent, moderate - worsening    2.  Binge eating disorder, mild - stable  3.  Alcohol use disorder, mild, in sustained remission - stable   4.  History of bipolar mood disorder, hallucinogen and cocaine use    Plan:  1.  Increase Lexapro to 20 mg daily for depression.  2.  Continue Tegretol  mg twice daily for mood stabilization.  She does not meet criteria for bipolar mood disorder.  -CBC with leukocytosis (2025)  -CMP with normal sodium, normal ALT/AST and ALP (2025)  3.  Continue Vyvanse 70 mg in the morning for binge eating disorder.  E-prescribed x 2 months.  She is aware of the medication shortage  -UDS positive for cannabis and amphetamines (2025)  4.  Discussed positive urine drug screen for cannabis.  She mentioned that she was using cannabis regularly but since she had labs done she is no longer using it.  Will repeat UDS in the next few months.  5.  Referral placed for individual psychotherapy  6.  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    Return to clinic in 2 months or sooner if symptoms worsen    The proposed treatment plan was discussed with the patient who was provided the opportunity to ask questions and make suggestions regarding alternative treatment. Patient verbalized understanding and expressed agreement with the plan.     Sonja Alvarez M.D.  06/03/25    This note was created using voice recognition software (Dragon). The accuracy of the dictation is limited by the abilities of the software. I have reviewed the note prior to signing, however some errors in grammar and context are still possible. If you have any questions related to this note please do not hesitate to contact our office.

## 2025-06-03 NOTE — Clinical Note
REFERRAL APPROVAL NOTICE         Sent on Jeanne 3, 2025                   Ryanne Story  [address]  [place] [address]                   Dear Ms. Story,    After a careful review of the medical information and benefit coverage, [facility] has processed your referral. See below for additional details.    If applicable, you must be actively enrolled with your insurance for coverage of the authorized service. If you have any questions regarding your coverage, please contact your insurance directly.    REFERRAL INFORMATION   Referral #:  [id number]  Referred-To Provider    Referred-By Provider:  Behavioral Health    Sonja Alvarez M.D.   [facility]      [address]  [address]  [place] [address]  [phone] [address] [facility] [address]  [phone]    Referral Start Date:  06/03/2025  Referral End Date:   06/03/2026             SCHEDULING  If you do not already have an appointment, please call [phone] to make an appointment.     MORE INFORMATION  If you do not already have a [employer] account, sign up at: [email or web address]  You can access your medical information, make appointments, see lab results, billing information, and more.  If you have questions regarding this referral, please contact  the [facility] Referrals department at:             [phone]. Monday - Friday 8:00AM - 5:00PM.     Sincerely,    [facility]

## 2025-08-01 ENCOUNTER — APPOINTMENT (OUTPATIENT)
Dept: BEHAVIORAL HEALTH | Facility: CLINIC | Age: 35
End: 2025-08-01
Payer: COMMERCIAL

## 2025-08-29 ENCOUNTER — TELEMEDICINE (OUTPATIENT)
Dept: BEHAVIORAL HEALTH | Facility: CLINIC | Age: 35
End: 2025-08-29
Payer: COMMERCIAL

## 2025-08-29 DIAGNOSIS — F33.1 MDD (MAJOR DEPRESSIVE DISORDER), RECURRENT EPISODE, MODERATE (HCC): Primary | ICD-10-CM

## 2025-08-29 DIAGNOSIS — F50.810 BINGE-EATING DISORDER, MILD: ICD-10-CM

## 2025-08-29 DIAGNOSIS — F10.11 ALCOHOL USE DISORDER, MILD, IN SUSTAINED REMISSION: ICD-10-CM

## 2025-08-29 RX ORDER — DULOXETIN HYDROCHLORIDE 60 MG/1
60 CAPSULE, DELAYED RELEASE ORAL
Qty: 30 CAPSULE | Refills: 1 | Status: SHIPPED | OUTPATIENT
Start: 2025-08-29

## 2025-08-29 RX ORDER — CARBAMAZEPINE 200 MG/1
200 TABLET, EXTENDED RELEASE ORAL 2 TIMES DAILY
Qty: 60 TABLET | Refills: 1 | Status: SHIPPED | OUTPATIENT
Start: 2025-08-29

## 2025-08-29 RX ORDER — TIZANIDINE HYDROCHLORIDE 4 MG/1
4 CAPSULE, GELATIN COATED ORAL 3 TIMES DAILY
COMMUNITY

## 2025-08-29 RX ORDER — LISDEXAMFETAMINE DIMESYLATE 70 MG/1
70 CAPSULE ORAL EVERY MORNING
Qty: 30 CAPSULE | Refills: 0 | Status: SHIPPED | OUTPATIENT
Start: 2025-09-05 | End: 2025-10-05

## 2025-08-29 RX ORDER — METFORMIN HYDROCHLORIDE 750 MG/1
750 TABLET, EXTENDED RELEASE ORAL DAILY
COMMUNITY
Start: 2025-08-18

## 2025-08-29 RX ORDER — LISDEXAMFETAMINE DIMESYLATE 70 MG/1
70 CAPSULE ORAL EVERY MORNING
Qty: 30 CAPSULE | Refills: 0 | Status: SHIPPED | OUTPATIENT
Start: 2025-10-04 | End: 2025-11-03

## 2025-08-29 ASSESSMENT — LIFESTYLE VARIABLES: HOW OFTEN DO YOU HAVE A DRINK CONTAINING ALCOHOL: NEVER

## (undated) DEVICE — TROCAR Z THREAD12MM OPTICAL - NON BLADED (6/BX)

## (undated) DEVICE — GLOVE BIOGEL PI INDICATOR SZ 6.5 SURGICAL PF LF - (50/BX 4BX/CA)

## (undated) DEVICE — GLOVE BIOGEL SZ 7.5 SURGICAL PF LTX - (50PR/BX 4BX/CA)

## (undated) DEVICE — SENSOR OXIMETER ADULT SPO2 RD SET (20EA/BX)

## (undated) DEVICE — SUTURE GENERAL

## (undated) DEVICE — COVER LIGHT HANDLE ALC PLUS DISP (18EA/BX)

## (undated) DEVICE — DERMABOND ADVANCED - (12EA/BX)

## (undated) DEVICE — GOWN WARMING STANDARD FLEX - (30/CA)

## (undated) DEVICE — LACTATED RINGERS INJ 1000 ML - (14EA/CA 60CA/PF)

## (undated) DEVICE — CLIP MED LG INTNL HRZN TI ESCP - (20/BX)

## (undated) DEVICE — TUBE E-T HI-LO CUFF 6.5MM (10EA/BX)

## (undated) DEVICE — SET LEADWIRE 5 LEAD BEDSIDE DISPOSABLE ECG (1SET OF 5/EA)

## (undated) DEVICE — NEEDLE INSFL 120MM 14GA VRRS - (20/BX)

## (undated) DEVICE — CANNULA W/SEAL 5X100 Z-THRE - ADED KII (12/BX)

## (undated) DEVICE — TUBING CLEARLINK DUO-VENT - C-FLO (48EA/CA)

## (undated) DEVICE — BAG RETRIEVAL 10ML (10EA/BX)

## (undated) DEVICE — SLEEVE, VASO, THIGH, MED

## (undated) DEVICE — CHLORAPREP 26 ML APPLICATOR - ORANGE TINT(25/CA)

## (undated) DEVICE — ELECTRODE DUAL RETURN W/ CORD - (50/PK)

## (undated) DEVICE — SUTURE 0 VICRYL PLUS UR-6 - 27 INCH (36/BX)

## (undated) DEVICE — SET TUBING PNEUMOCLEAR HIGH FLOW SMOKE EVACUATION (10EA/BX)

## (undated) DEVICE — SYRINGE 30 ML LL (56/BX)

## (undated) DEVICE — SUTURE 4-0 MONOCRYL PLUS PS-2 - 27 INCH (36/BX)

## (undated) DEVICE — SODIUM CHL IRRIGATION 0.9% 1000ML (12EA/CA)

## (undated) DEVICE — CANISTER SUCTION 3000ML MECHANICAL FILTER AUTO SHUTOFF MEDI-VAC NONSTERILE LF DISP  (40EA/CA)

## (undated) DEVICE — SCISSORS 5MM CVD (6EA/BX)

## (undated) DEVICE — SUCTION INSTRUMENT YANKAUER BULBOUS TIP W/O VENT (50EA/CA)

## (undated) DEVICE — PACK LAP CHOLE OR - (2EA/CA)

## (undated) DEVICE — TROCAR 5X100 NON BLADED Z-TH - READ KII (6/BX)

## (undated) DEVICE — SET EXTENSION WITH 2 PORTS (48EA/CA) ***PART #2C8610 IS A SUBSTITUTE*****